# Patient Record
Sex: FEMALE | Race: WHITE
[De-identification: names, ages, dates, MRNs, and addresses within clinical notes are randomized per-mention and may not be internally consistent; named-entity substitution may affect disease eponyms.]

---

## 2017-08-12 ENCOUNTER — HOSPITAL ENCOUNTER (EMERGENCY)
Dept: HOSPITAL 45 - C.EDB | Age: 82
Discharge: HOME | End: 2017-08-12
Payer: COMMERCIAL

## 2017-08-12 VITALS
WEIGHT: 186.51 LBS | HEIGHT: 59.02 IN | WEIGHT: 186.51 LBS | BODY MASS INDEX: 37.6 KG/M2 | BODY MASS INDEX: 37.6 KG/M2 | HEIGHT: 59.02 IN

## 2017-08-12 VITALS — SYSTOLIC BLOOD PRESSURE: 158 MMHG | OXYGEN SATURATION: 95 % | HEART RATE: 67 BPM | DIASTOLIC BLOOD PRESSURE: 74 MMHG

## 2017-08-12 VITALS — TEMPERATURE: 98.24 F

## 2017-08-12 DIAGNOSIS — I48.0: ICD-10-CM

## 2017-08-12 DIAGNOSIS — E78.00: ICD-10-CM

## 2017-08-12 DIAGNOSIS — M25.062: ICD-10-CM
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
## 2017-08-12 DIAGNOSIS — S82.832A: Primary | ICD-10-CM

## 2017-08-12 DIAGNOSIS — Z79.01: ICD-10-CM

## 2017-08-12 DIAGNOSIS — Z98.890: ICD-10-CM

## 2017-08-12 DIAGNOSIS — Z98.49: ICD-10-CM

## 2017-08-12 DIAGNOSIS — K21.9: ICD-10-CM

## 2017-08-12 DIAGNOSIS — Z79.899: ICD-10-CM

## 2017-08-12 DIAGNOSIS — Z82.49: ICD-10-CM

## 2017-08-12 DIAGNOSIS — Z90.710: ICD-10-CM

## 2017-08-12 DIAGNOSIS — G47.33: ICD-10-CM

## 2017-08-12 DIAGNOSIS — I11.0: ICD-10-CM

## 2017-08-12 DIAGNOSIS — J45.909: ICD-10-CM

## 2017-08-12 DIAGNOSIS — I50.9: ICD-10-CM

## 2017-08-12 NOTE — DIAGNOSTIC IMAGING REPORT
LEFT KNEE 2 VIEWS



HISTORY:      left knee pain s/p fall.  on Coumadin



COMPARISON: Left knee 6/21/2016.



FINDINGS: No dislocation. The bones are osteopenic. Anterior soft tissue

swelling. No knee effusion. Severe osteoarthritis at the medial and

patellofemoral compartments. This remains unchanged. Abnormal lucency at the

fibular neck. This is consistent with an age-indeterminate fracture. No

radiopaque foreign bodies.



IMPRESSION:  



1. Age-indeterminate fibular neck fracture. Recommend correlation for pain to

assess for an acute fracture.

2. Anterior soft tissue swelling.

3. Severe osteoarthritis.







Electronically signed by:  Cruz Clemens M.D.

8/12/2017 11:02 AM



Dictated Date/Time:  8/12/2017 11:00 AM

## 2017-08-12 NOTE — EMERGENCY ROOM VISIT NOTE
History


Report prepared by Satinder:  Li Ralph


Under the Supervision of:  Dr. Wyatt Rae M.D.


First contact with patient:  10:37


Chief Complaint:  KNEEPAIN


Stated Complaint:  SORE LEFT LEG





History of Present Illness


The patient is a 82 year old female who presents to the Emergency Room with 

complaints of constant left knee pain, which she rates at an 8/10. The patient 

reports that she fell going up stairs one week ago. She has been taking Tylenol 

for her pain. The patient also reports having arthritis and that she gets 

injections in her knees. She denies having a history of blood clots. She 

reports that she is on Coumadin for atrial fibrillation.





   Source of History:  patient


   Onset:  1 week ago


   Position:  knee (left)


   Symptom Intensity:  rated at an 8/10


   Timing:  constant





Review of Systems


See HPI for pertinent positives & negatives. A total of 6 systems reviewed and 

were otherwise negative.





Past Medical & Surgical


Medical Problems:


(1) Asthma


(2) Benign hypertension


(3) Bronchitis


(4) CHF (congestive heart failure)


(5) Edema


(6) GERD (gastroesophageal reflux disease)


(7) Hiatal hernia


(8) High cholesterol


(9) CINDY on CPAP


(10) Paroxysmal atrial fibrillation


(11) PNA (pneumonia)


(12) s/p elective cardioversion


(13) Seasonal allergies


Surgical Problems:


(1) H/O: hysterectomy


(2) S/P cataract surgery








Family History





Cancer


FH: hypertension


Heart disease





Social History


Smoking Status:  Never Smoker


Alcohol Use:  none


Drug Use:  none


Marital Status:  


Housing Status:  lives with significant other


Occupation Status:  employed





Current/Historical Medications


Scheduled


Acetaminophen (Tylenol), 325 MG PO UD


Albuterol Hfa (Ventolin Hfa), 2 PUFFS INH Q4H


Amiodarone HCl (Amiodarone HCl), 200 MG PO DAILY


Fluticasone Prop/Salmeterol (Advair Diskus 100/50 60 Dose), 1 PUFF INH BID


Furosemide (Lasix), 80 MG PO BID


Magnesium Chloride (Slow-Mag Tab), 64 MG PO BID


Metoprolol Succinate (Metoprolol Succinate ER), 12.5 MG PO DAILY


Omeprazole (Prilosec), 20 MG PO DAILY


Potassium Chloride Microencaps (Potassium Chloride Er), 10 MEQ PO BID


Ranitidine (Zantac), 300 MG PO HS


Simvastatin (Simvastatin), 40 MG PO HS


Warfarin Sodium (Warfarin Sodium), 5 MG PO 6XWK


Warfarin Sodium (Warfarin Sodium), 7.5 MG PO WK





Allergies


Coded Allergies:  


     Alcohol (Verified  Allergy, Mild, RASH, 8/12/17)


 WIPES OR INGESTED


     Cephalosporins (Verified  Allergy, Mild, RASH, 8/12/17)


     Codeine (Verified  Allergy, Mild, RASH, 8/12/17)


     Penicillins (Verified  Allergy, Mild, RASH, 8/12/17)


     Aspirin (Unverified  Allergy, Unknown, RASH, 8/12/17)


     Levofloxacin (Verified  Allergy, Unknown, "BONE PAIN", 8/12/17)


     Sulfa Antibiotics (Verified  Allergy, Unknown, UNKNOWN, 8/12/17)





Physical Exam


Vital Signs











  Date Time  Temp Pulse Resp B/P (MAP) Pulse Ox O2 Delivery O2 Flow Rate FiO2


 


8/12/17 11:35  67 17 158/74 95   


 


8/12/17 10:22 36.8 63 16 165/78 95 Room Air  











Physical Exam


GENERAL: Patient is well appearing and in minimal distress.


HEENT: No acute trauma, normocephalic atraumatic, mucous membranes moist, no 

nasal congestion, no scleral icterus.


NECK: No stridor, no adenopathy, no meningismus, trachea is midline.


LUNGS: No dyspnea. Clear to auscultation and equal bilaterally. No wheeze, no 

rhonchi.


HEART: Regular rate and rhythm.  No murmurs, rubs, gallops appreciated.


BACK: No midline tenderness, no CVA tenderness


EXTREMITIES: Mild effusion of left knee compared to right. Tenderness to 

palpation to lateral aspect of left knee. Left knee is stable and she has full 

range of motion with minimal pain. 


NEUROLOGIC: Alert and oriented, no acute motor or sensory deficits, no focal 

weakness, cranial nerves grossly intact.


SKIN: No rash, no jaundice, no diaphoresis.





Medical Decision & Procedures


ER Provider


Diagnostic Interpretation:


X ray results are stated below per my interpretation and the radiologist's 

interpretation. 





LEFT KNEE 2 VIEWS





HISTORY:      left knee pain s/p fall.  on Coumadin





COMPARISON: Left knee 6/21/2016.





FINDINGS: No dislocation. The bones are osteopenic. Anterior soft tissue


swelling. No knee effusion. Severe osteoarthritis at the medial and


patellofemoral compartments. This remains unchanged. Abnormal lucency at the


fibular neck. This is consistent with an age-indeterminate fracture. No


radiopaque foreign bodies.





IMPRESSION:  





1. Age-indeterminate fibular neck fracture. Recommend correlation for pain to


assess for an acute fracture.


2. Anterior soft tissue swelling.


3. Severe osteoarthritis.











Electronically signed by:  Cruz Clemens M.D.


8/12/2017 11:02 AM





Dictated Date/Time:  8/12/2017 11:00 AM





ED Course


1038: The patient was evaluated in room C3. A complete history and physical 

exam was performed.





1118: I went over treatment plans with the patient and she says that she does 

not want casting or splinting and that she will follow up with orthopedics. She 

will use her walker at home. I went over and she understands symptoms that 

require return. 





1120: Reevaluated the patient. Discussed results and discharge instructions: 

She verbalized understanding and agreement.   The patient is ready for 

discharge.





Medical Decision


Differential: Fracture, Dislocation, Cellulitis, Septic Joint, Ligamentous 

Injury, Effusion, DVT, amongst other pathologies entertained.





Pleasant 82 yr old female with recent left knee trauma.  Small effusion on exam 

likely hemarthrosis given Coumadin use.  This knee is not infected by exam 

currently.  Imaging with small fibular neck fracture consistent with recent 

injury.  Discussed immobilization but we both agree this would be dangerous 

given her already poor mobility along with fact she is on Coumadin.  I do not 

feel this is dvt, especially given therapeutic INR.  She has ortho that she 

will follow up with.  Stable and comfortable.





Medication Reconcilliation


Current Medication List:  was personally reviewed by me





Blood Pressure Screening


Patient's blood pressure:  Elevated blood pressure


Blood pressure disposition:  Elevated BP felt to be situational





Impression





 Primary Impression:  


 Left fibular fracture


 Additional Impressions:  


 Closed fracture of neck of fibula


 Knee hemarthrosis, left





Scribe Attestation


The scribe's documentation has been prepared under my direction and personally 

reviewed by me in its entirety. I confirm that the note above accurately 

reflects all work, treatment, procedures, and medical decision making performed 

by me.





Departure Information


Dispostion


Home / Self-Care





Referrals


Soham Roche MD (PCP)








Aaron Ervin M.D.





Patient Instructions


My [facility]





Additional Instructions





There is a fracture of the top of your Fibula.  Rest, elevation, Ice are 

important over the next few days.  Return if worsening pain, difficulty 

breathing or other concerns.


Use Tylenol as needed for pain.


Use your walker.


Follow up with Orthopedics in the next 1 to 2 weeks.





Problem Qualifiers

## 2017-08-13 ENCOUNTER — HOSPITAL ENCOUNTER (INPATIENT)
Dept: HOSPITAL 45 - C.EDB | Age: 82
LOS: 2 days | Discharge: HOME | DRG: 563 | End: 2017-08-15
Attending: FAMILY MEDICINE | Admitting: HOSPITALIST
Payer: COMMERCIAL

## 2017-08-13 VITALS
HEIGHT: 59 IN | HEIGHT: 59 IN | BODY MASS INDEX: 16.76 KG/M2 | BODY MASS INDEX: 16.76 KG/M2 | WEIGHT: 83.11 LBS | WEIGHT: 83.11 LBS

## 2017-08-13 DIAGNOSIS — Z79.01: ICD-10-CM

## 2017-08-13 DIAGNOSIS — E87.6: ICD-10-CM

## 2017-08-13 DIAGNOSIS — I48.0: ICD-10-CM

## 2017-08-13 DIAGNOSIS — Z51.81: ICD-10-CM

## 2017-08-13 DIAGNOSIS — Z98.49: ICD-10-CM

## 2017-08-13 DIAGNOSIS — I50.32: ICD-10-CM

## 2017-08-13 DIAGNOSIS — Z87.01: ICD-10-CM

## 2017-08-13 DIAGNOSIS — E78.00: ICD-10-CM

## 2017-08-13 DIAGNOSIS — G47.33: ICD-10-CM

## 2017-08-13 DIAGNOSIS — E78.5: ICD-10-CM
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
## 2017-08-13 DIAGNOSIS — I13.0: ICD-10-CM

## 2017-08-13 DIAGNOSIS — N18.3: ICD-10-CM

## 2017-08-13 DIAGNOSIS — Z87.442: ICD-10-CM

## 2017-08-13 DIAGNOSIS — K21.9: ICD-10-CM

## 2017-08-13 DIAGNOSIS — Z98.890: ICD-10-CM

## 2017-08-13 DIAGNOSIS — N39.0: ICD-10-CM
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
## 2017-08-13 DIAGNOSIS — Z82.49: ICD-10-CM
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
## 2017-08-13 DIAGNOSIS — M25.062: ICD-10-CM

## 2017-08-13 DIAGNOSIS — S82.832A: Primary | ICD-10-CM

## 2017-08-13 DIAGNOSIS — J45.909: ICD-10-CM

## 2017-08-13 DIAGNOSIS — Z90.710: ICD-10-CM

## 2017-08-13 DIAGNOSIS — Z79.899: ICD-10-CM

## 2017-08-13 LAB
ALBUMIN/GLOB SERPL: 0.6 {RATIO} (ref 0.9–2)
ALP SERPL-CCNC: 298 U/L (ref 45–117)
ALT SERPL-CCNC: 34 U/L (ref 12–78)
ANION GAP SERPL CALC-SCNC: 9 MMOL/L (ref 3–11)
APPEARANCE UR: (no result)
AST SERPL-CCNC: 71 U/L (ref 15–37)
BASOPHILS # BLD: 0.02 K/UL (ref 0–0.2)
BASOPHILS NFR BLD: 0.1 %
BILIRUB UR-MCNC: (no result) MG/DL
BUN SERPL-MCNC: 20 MG/DL (ref 7–18)
BUN/CREAT SERPL: 15.4 (ref 10–20)
CALCIUM SERPL-MCNC: 8.5 MG/DL (ref 8.5–10.1)
CHLORIDE SERPL-SCNC: 100 MMOL/L (ref 98–107)
CKMB/CK RATIO: (no result) (ref 0–3)
CO2 SERPL-SCNC: 27 MMOL/L (ref 21–32)
COLOR UR: (no result)
COMPLETE: YES
CREAT SERPL-MCNC: 1.3 MG/DL (ref 0.6–1.2)
CRP SERPL-MCNC: 2.05 MG/DL (ref 0–0.29)
EOSINOPHIL NFR BLD AUTO: 265 K/UL (ref 130–400)
GLOBULIN SER-MCNC: 4.4 GM/DL (ref 2.5–4)
GLUCOSE SERPL-MCNC: 110 MG/DL (ref 70–99)
HCT VFR BLD CALC: 37.6 % (ref 37–47)
IG%: 0.3 %
IMM GRANULOCYTES NFR BLD AUTO: 6.7 %
INR PPP: 2.3 (ref 0.9–1.1)
LYME DISEASE AB IGG: (no result)
LYME DISEASE AB IGM: (no result)
LYMPHOCYTES # BLD: 1.23 K/UL (ref 1.2–3.4)
MAGNESIUM SERPL-MCNC: 2 MG/DL (ref 1.8–2.4)
MANUAL MICROSCOPIC REQUIRED?: NO
MCH RBC QN AUTO: 26.2 PG (ref 25–34)
MCHC RBC AUTO-ENTMCNC: 31.6 G/DL (ref 32–36)
MCV RBC AUTO: 82.8 FL (ref 80–100)
MONOCYTES NFR BLD: 5.8 %
NEUTROPHILS # BLD AUTO: 0 %
NEUTROPHILS NFR BLD AUTO: 87.1 %
NITRITE UR QL STRIP: (no result)
PARTIAL THROMBOPLASTIN RATIO: 1.4
PH UR STRIP: 5 [PH] (ref 4.5–7.5)
PMV BLD AUTO: 10.6 FL (ref 7.4–10.4)
POTASSIUM SERPL-SCNC: 3.7 MMOL/L (ref 3.5–5.1)
PROTHROMBIN TIME: 25.2 SECONDS (ref 9–12)
RBC # BLD AUTO: 4.54 M/UL (ref 4.2–5.4)
RBC # CSF: 0.88 NG/ML (ref 0–0.5)
REVIEW REQ?: YES
SODIUM SERPL-SCNC: 136 MMOL/L (ref 136–145)
SP GR UR STRIP: 1.02 (ref 1–1.03)
URINE EPITHELIAL CELL AUTO: >30 /LPF (ref 0–5)
UROBILINOGEN UR-MCNC: (no result) MG/DL
WBC # BLD AUTO: 18.46 K/UL (ref 4.8–10.8)
ZZUR CULT IF INDIC CLEAN CATCH: YES

## 2017-08-14 VITALS
SYSTOLIC BLOOD PRESSURE: 149 MMHG | DIASTOLIC BLOOD PRESSURE: 70 MMHG | TEMPERATURE: 97.7 F | HEART RATE: 66 BPM | OXYGEN SATURATION: 96 %

## 2017-08-14 VITALS
OXYGEN SATURATION: 95 % | TEMPERATURE: 97.88 F | SYSTOLIC BLOOD PRESSURE: 124 MMHG | HEART RATE: 55 BPM | DIASTOLIC BLOOD PRESSURE: 63 MMHG

## 2017-08-14 VITALS
TEMPERATURE: 97.34 F | OXYGEN SATURATION: 93 % | DIASTOLIC BLOOD PRESSURE: 60 MMHG | HEART RATE: 64 BPM | SYSTOLIC BLOOD PRESSURE: 95 MMHG

## 2017-08-14 VITALS
OXYGEN SATURATION: 95 % | DIASTOLIC BLOOD PRESSURE: 82 MMHG | HEART RATE: 67 BPM | SYSTOLIC BLOOD PRESSURE: 155 MMHG | TEMPERATURE: 97.7 F

## 2017-08-14 VITALS — SYSTOLIC BLOOD PRESSURE: 106 MMHG | HEART RATE: 71 BPM | DIASTOLIC BLOOD PRESSURE: 63 MMHG

## 2017-08-14 LAB
ANION GAP SERPL CALC-SCNC: 6 MMOL/L (ref 3–11)
BUN SERPL-MCNC: 19 MG/DL (ref 7–18)
BUN/CREAT SERPL: 15.6 (ref 10–20)
CALCIUM SERPL-MCNC: 8.4 MG/DL (ref 8.5–10.1)
CHLORIDE SERPL-SCNC: 102 MMOL/L (ref 98–107)
CO2 SERPL-SCNC: 30 MMOL/L (ref 21–32)
CREAT CL PREDICTED SERPL C-G-VRATE: 21.9 ML/MIN
CREAT SERPL-MCNC: 1.2 MG/DL (ref 0.6–1.2)
EOSINOPHIL NFR BLD AUTO: 252 K/UL (ref 130–400)
GLUCOSE SERPL-MCNC: 78 MG/DL (ref 70–99)
HCT VFR BLD CALC: 36.2 % (ref 37–47)
INR PPP: 2.2 (ref 0.9–1.1)
MAGNESIUM SERPL-MCNC: 2.2 MG/DL (ref 1.8–2.4)
MCH RBC QN AUTO: 25.7 PG (ref 25–34)
MCHC RBC AUTO-ENTMCNC: 30.9 G/DL (ref 32–36)
MCV RBC AUTO: 83.2 FL (ref 80–100)
PMV BLD AUTO: 11.4 FL (ref 7.4–10.4)
POTASSIUM SERPL-SCNC: 3.2 MMOL/L (ref 3.5–5.1)
PROTHROMBIN TIME: 23.8 SECONDS (ref 9–12)
RBC # BLD AUTO: 4.35 M/UL (ref 4.2–5.4)
SODIUM SERPL-SCNC: 138 MMOL/L (ref 136–145)
WBC # BLD AUTO: 14.34 K/UL (ref 4.8–10.8)

## 2017-08-14 RX ADMIN — FLUTICASONE PROPIONATE AND SALMETEROL SCH PUFF: 50; 100 POWDER RESPIRATORY (INHALATION) at 21:07

## 2017-08-14 RX ADMIN — METOPROLOL SUCCINATE SCH MG: 25 TABLET, EXTENDED RELEASE ORAL at 08:14

## 2017-08-14 RX ADMIN — IMIPENEM AND CILASTATIN SODIUM SCH MLS/HR: 500; 500 INJECTION, POWDER, FOR SOLUTION INTRAVENOUS at 05:28

## 2017-08-14 RX ADMIN — ACETAMINOPHEN PRN MG: 325 TABLET ORAL at 22:21

## 2017-08-14 RX ADMIN — ALBUTEROL SULFATE SCH PUFFS: 90 AEROSOL, METERED RESPIRATORY (INHALATION) at 16:01

## 2017-08-14 RX ADMIN — ACETAMINOPHEN PRN MG: 325 TABLET ORAL at 13:50

## 2017-08-14 RX ADMIN — ALBUTEROL SULFATE SCH PUFFS: 90 AEROSOL, METERED RESPIRATORY (INHALATION) at 08:08

## 2017-08-14 RX ADMIN — FUROSEMIDE SCH MG: 80 TABLET ORAL at 17:03

## 2017-08-14 RX ADMIN — FLUTICASONE PROPIONATE AND SALMETEROL SCH PUFF: 50; 100 POWDER RESPIRATORY (INHALATION) at 08:12

## 2017-08-14 RX ADMIN — IMIPENEM AND CILASTATIN SODIUM SCH MLS/HR: 250; 250 INJECTION, POWDER, FOR SOLUTION INTRAVENOUS at 18:12

## 2017-08-14 RX ADMIN — ALBUTEROL SULFATE SCH PUFFS: 90 AEROSOL, METERED RESPIRATORY (INHALATION) at 04:00

## 2017-08-14 RX ADMIN — POTASSIUM CHLORIDE SCH MEQ: 750 TABLET, EXTENDED RELEASE ORAL at 08:16

## 2017-08-14 RX ADMIN — AMIODARONE HYDROCHLORIDE SCH MG: 200 TABLET ORAL at 08:13

## 2017-08-14 RX ADMIN — IMIPENEM AND CILASTATIN SODIUM SCH MLS/HR: 500; 500 INJECTION, POWDER, FOR SOLUTION INTRAVENOUS at 11:28

## 2017-08-14 RX ADMIN — Medication SCH MG: at 08:15

## 2017-08-14 RX ADMIN — POTASSIUM CHLORIDE SCH MEQ: 750 TABLET, EXTENDED RELEASE ORAL at 21:07

## 2017-08-14 RX ADMIN — ATORVASTATIN CALCIUM SCH MG: 10 TABLET, FILM COATED ORAL at 08:15

## 2017-08-14 RX ADMIN — PANTOPRAZOLE SCH MG: 40 TABLET, DELAYED RELEASE ORAL at 08:13

## 2017-08-14 RX ADMIN — ALBUTEROL SULFATE SCH PUFFS: 90 AEROSOL, METERED RESPIRATORY (INHALATION) at 19:45

## 2017-08-14 RX ADMIN — Medication SCH MG: at 21:08

## 2017-08-14 RX ADMIN — ALBUTEROL SULFATE SCH PUFFS: 90 AEROSOL, METERED RESPIRATORY (INHALATION) at 11:28

## 2017-08-14 RX ADMIN — POTASSIUM CHLORIDE SCH MEQ: 750 TABLET, EXTENDED RELEASE ORAL at 21:17

## 2017-08-14 RX ADMIN — FUROSEMIDE SCH MG: 80 TABLET ORAL at 08:24

## 2017-08-14 NOTE — DIAGNOSTIC IMAGING REPORT
CHEST ONE VIEW PORTABLE



CLINICAL HISTORY: Sepsis dyspnea



COMPARISON STUDY:  11/9/2015



FINDINGS: Moderate stable cardiomegaly. Stable mediastinal fullness unchanged

from the prior exam. Components of mild congestive failure present. Increased

prominence of pulmonary vasculature compared to the prior study. Fixed lateral

hernia. 



IMPRESSION:  Mild congestive heart failure superimposed upon chronic change. 











The above report was generated using voice recognition software.  It may contain

grammatical, syntax or spelling errors.









Electronically signed by:  Enresto Gusman M.D.

8/14/2017 6:54 AM



Dictated Date/Time:  8/14/2017 6:53 AM

## 2017-08-14 NOTE — DIAGNOSTIC IMAGING REPORT
LEFT VENOUS DOPP LOWER EXT UNILAT



CLINICAL HISTORY: left leg swelling pain. Edema.



TECHNIQUE: Venous Dopp



COMPARISON STUDY:  None



FINDINGS: Normal study



IMPRESSION:  Normal study 









The above report was generated using voice recognition software.  It may contain

grammatical, syntax or spelling errors.







Electronically signed by:  Ernesto Gusman M.D.

8/14/2017 6:39 AM



Dictated Date/Time:  8/14/2017 6:39 AM

## 2017-08-14 NOTE — ORTHOPEDIC CONSULTATION REPORT
DATE OF CONSULTATION:  08/14/2017

 

REASON FOR CONSULT:  Left knee pain.

 

HISTORY OF PRESENT ILLNESS:  The patient is an 82-year-old white female who

was admitted for UTI and fevers during this stay.  She was complaining of

some left lateral knee pain and we were consulted to see her.  She states

that last week, she was seen in the Emergency Room after a mechanical fall. 

She states that she was walking in another person's home and went to go up

one step.  She states that the step was higher than normal and she caught her

toe on the step and ended up falling directly in both knees.  At that time,

she had moderate pain in the left knee and that was on the lateral aspect. 

She was brought to the Emergency Room and was found that she had a proximal

fibular neck fracture that was nondisplaced.  She was sent home with plans

for use of a walker and weightbearing as tolerated and to follow up with

orthopedics.  She states that over the last week, she was having less pain

actually in the area, but continued to bother her somewhat.  She states that

she is on Coumadin and had some increased swelling in the area as well. 

Currently, she is sitting in a chair and is in no acute distress and

pleasant.

 

PAST MEDICAL HISTORY:  Chronic atrial fibrillation on chronic Coumadin

therapy, diastolic heart failure, hypertension, hyperlipidemia, obstructive

sleep apnea on CPAP, asthma, and chronic kidney disease.

 

PAST SURGICAL HISTORY:  Cataracts removal in the past and TAHBSO.

 

FAMILY AND SOCIAL HISTORY:  As per admitting history and physical.

 

ALLERGIES:  ALCOHOL, ASPIRIN, CEPHALOSPORINS, CODEINE, LEVOFLOXACIN,

PENICILLINS, AND SULFA ANTIBIOTICS.

 

MEDICATIONS:  Tylenol 325 mg p.o. p.r.n. as directed, albuterol 2 puffs

inhaled q. 4 hours, amiodarone 200 mg p.o. daily, atorvastatin 10 mg p.o.

daily, Advair Diskus 1 puff inhaled b.i.d., furosemide 80 mg p.o. b.i.d.,

Slow-Mag 64 mg p.o. b.i.d., metoprolol 12.5 mg p.o. daily, Prilosec 20 mg

p.o. daily, potassium chloride extended release 10 mEq p.o. b.i.d., Zantac

300 mg at bedtime and warfarin 5 mg p.o. 6 times a week at bedtime and 7.5 mg

p.o. once weekly at bedtime.

 

REVIEW OF SYSTEMS:  As per admitting history and physical.

 

PHYSICAL EXAMINATION:

VITAL SIGNS:  Latest vital signs are temperature 36.5, pulse 66, respirations

18, /70, and pulse ox 96 on room air.

GENERAL:  The patient is an obese white female, who is alert and oriented x3.

 She is in no acute distress, pleasant and cooperative.

EXTREMITIES:  On examination, which focuses on her left knee, she currently

is able to actively flex and extend the knee without discomfort.  There is no

erythema and no heat to the left knee compared to the right.  She does have a

little bit more swelling and an effusion of the left knee compared to the

right.  The swelling does go down the lower extremity and she does have what

appears to be likely hematoma down the posterolateral aspect of her calf,

which is mildly tender on palpation.  Negative Homans' exam at this time.  I

can take her through range of motion passively from 90 degrees to extension

with only minimal discomfort in the left lateral portion of the knee.  On

palpation over the fibular head, she has moderate pain during this time.  I

can appreciate no crepitus during palpation.  Internal and external rotation

do provide a little bit of discomfort on the lateral aspect of the left knee.

 She is neurovascularly intact and has good sensation.

 

ASSESSMENT:  Likely proximal fibular neck fracture from previous fall 1 week

ago that is progressively getting better.

 

PLAN:  Dr. Ervin and I have reviewed her films and he has seen the patient

as well.  We will start her on physical therapy protocol, weightbearing as

tolerated with assistive device as needed.  She has seen Dr. Ervin in the

past for injections and if over the time, she feels that she is having

increasing knee pain that she has had in the past that she can see him in the

office.  She can also follow up with him in the office if she continues to

have her left lateral knee pain.  The patient is asking for pain medication,

which she states that Tylenol is not working for her.  Advised her to take 2

of the 325-mg tablets of Tylenol every 8 hours and I will go ahead and add

tramadol to her pain regimen here at the hospital.

## 2017-08-14 NOTE — HISTORY & PHYSICAL EXAMINATION
DATE OF ADMISSION:  08/13/2017

 

PRIMARY CARE PROVIDER:  Dr. Roche.

 

CHIEF COMPLAINT:  Increasing pain in the left knee joint and also complains

of fever without any dysuria.

 

HISTORY OF PRESENT COMPLAINT:  She is an 82-year-old female with significant

past medical history of chronic atrial fibrillation, on anticoagulation,

diastolic heart failure, hypertension, obstructive sleep apnea, on CPAP,

history of asthma, chronic kidney disease and hyperlipidemia.  Apparently had

a fall yesterday and injured her left knee, that was a mechanical fall.  She

came to the ER and she had an x-ray of the left knee that did show

age-indeterminate fibular neck fracture.  She was sent home with pain

medications and Tylenol, and she was advised to make an appointment with

orthopedic doctor.  Dr. Ervin had done injection of her knee joint in the

past.  She was complaining of more pain as of today and she is back but the

swelling is decreased.  She also has some feverish feeling but did not have

any other symptoms suggestive of an infection.

 

In the ER, she was noted to be febrile at 38.2 and her white count was 18.46.

 Her UA is suggestive of infection.  Her knee swelling decreased, but with

fever and UTI and ongoing knee pain, she was admitted to medical floor for

continuation of care.

 

PAST MEDICAL HISTORY:  Chronic atrial fibrillation, on anticoagulation,

diastolic heart failure, hypertension, hyperlipidemia, obstructive sleep

apnea, on CPAP, mild asthma and chronic kidney disease.

 

PAST SURGICAL HISTORY:  Cataract surgery, total abdominal hysterectomy and

tubal removal.

 

FAMILY HISTORY:  Nothing significant to note.

 

SOCIAL HISTORY:  She is .  She lives with her .  She has 2

children.  She does not smoke, does not drink.

 

ALLERGIES:  SHE IS ALLERGIC TO ALCOHOL, CEPHALOSPORIN, CODEINE, PENICILLINS,

ASPIRIN, LEVOFLOXACIN AND SULFA.

 

MEDICATIONS:  As an outpatient, she has been on Tylenol 325 mg as directed,

albuterol sulfate 2 puffs every 4 hourly as needed, amiodarone 200 mg daily,

atorvastatin 10 mg daily, Advair Diskus 100/50 one puff twice daily,

furosemide 80 mg twice daily, magnesium chloride 64 mg b.i.d., Toprol-XL 25

mg tablet 12.5 daily, omeprazole 20 mg daily, potassium chloride 10 mEq

daily, ranitidine 300 mg at night and warfarin sodium 5 mg as directed.

 

REVIEW OF SYSTEMS:  Other systemic review unremarkable except those mentioned

in history of present complaint.

 

PHYSICAL EXAMINATION:

GENERAL:  On examination in the Emergency Room, she was not in any acute

distress.

VITAL SIGNS:  Temperature 38.2 initially, that came down to 36.8, pulse of

67, blood pressure 126/66, saturation 92% on room air.

HEENT:  Unremarkable.

NECK:  Supple.  No JVD, no bruit.

CHEST:  Clear to auscultation bilaterally.

HEART:  S1, S2 regular.

ABDOMEN:  Soft, benign, mildly tender in the hypogastrium.  No organomegaly. 

Bowel sounds present.

EXTREMITIES:  Trace edema bilaterally.  Examination of the left knee joint

did show minimal swelling of the knee joint anteromedially with some

tenderness locally.  Knee movement is moderately painful.  No obvious joint

fluid noted.

CENTRAL NERVOUS SYSTEM:  Alert, awake, oriented x3.  No focal sensory and/or

motor deficit appreciated.

 

LABORATORY DATA:  Noted today, white count was 18.46, H&H 11.9/37.6, platelet

was 265.  Sodium 136, potassium 3.7, chloride 100, carbon dioxide 27, BUN 20,

creatinine 1.30.  Random glucose 100.  Alkaline phosphatase 298.  AST

slightly elevated at 71.  Troponin less than 0.015.  C-reactive protein 2.05.

 ESR was 30.  Prolactin was 0.88.  INR was 2.3.  PT ratio 1.4.  UA

examination suggestive of infection.  Lyme disease negative.

 

Chest x-ray unremarkable.  X-ray of the knee did show indeterminate fracture

of the fibular neck.  Ultrasound negative for any DVT.

 

IMPRESSION AND PLAN:

1.  Urinary tract infection.  The patient will be admitted to medical floor. 

She was started on intravenous daptomycin and imipenem with possibility of

sepsis.Doubt any septic arthritis.  We will continue with imipenem now.  

Blood culture and urine culture have been sent.

2.  Fibular neck fracture on the left side status post fall.  There is some

swelling.  The patient is on Coumadin.  We will ask for orthopedic evaluation

and PT and OT from there.  Doubt any obvious hematoma at this time or any

infection in the joint.

3.  Atrial fibrillation, on anticoagulation.  No acute findings at this time.

 Her EKG is in sinus rhythm with minimal nonspecific ST-T wave changes.

4.  Chronic diastolic heart failure.  Continue with current dose of Lasix.

5.  Hypertension.  Continue with current medications for blood pressure.

6.  Sleep apnea, on CPAP.  The patient can use her own CPAP while in the

hospital.

7.  Asthma, seems to be stable.  Continue with her inhalers.

8.  Gastrointestinal prophylaxis with Protonix and ranitidine.

9.  Deep venous thrombosis prophylaxis, on Coumadin.

10.  Code status.  Full code.

 

In my clinical judgment, the beneficiary meets criteria as per CMS for

2-midnight stay in the hospital.

 

 

 

MTDD

## 2017-08-14 NOTE — DIAGNOSTIC IMAGING REPORT
LEFT KNEE 3 VIEWS



CLINICAL HISTORY: pain, swelling, fever pain. Edema.



COMPARISON: None.



DISCUSSION: Considerable degenerative change all major joint compartments.

Moderate reactive osteophytic change throughout. Significant degenerative change

patellofemoral joint compartment. There is no evidence for soft tissue swelling.



IMPRESSION: Considerable degenerative change. No acute bony abnormality.











The above report was generated using voice recognition software.  It may contain

grammatical, syntax or spelling errors.







Electronically signed by:  Ernesto Gusman M.D.

8/14/2017 6:48 AM



Dictated Date/Time:  8/14/2017 6:48 AM

## 2017-08-14 NOTE — EMERGENCY ROOM VISIT NOTE
History


First contact with patient:  21:58


Chief Complaint:  KNEEPAIN


Stated Complaint:  LEFT KNEE PAIN, FEVER





History of Present Illness


The patient is a 82 year old female who presents to the Emergency Room with 

complaints of fever, chills, left knee pain and swelling for the past day.  

Patient is on Coumadin for A. fib.  Dr. Ervin is her orthopedic doctor.  Dr. Alvarez is her cardiologist.  Patient seen here yesterday and diagnosed with a 

left proximal fibular fracture.  She was advised to follow-up with orthopedics.

  Patient took Tylenol at 8 PM today.  She took 1 g.  Patient was increasing 

left knee pain and swelling.  She's had injections in this knee before.   

Patient also complains of left lateral chest pain after falling 1 week ago when 

she initially injured her knee.  Patient thinks it's a muscular pain.  Patient 

denies dyspnea, productive cough, abdominal pain, vomiting, diarrhea, back pain

, urinary symptoms, cold symptoms.  No recent travel.  No sick contacts.





Review of Systems


See HPI for pertinent positives & negatives. A total of 10 systems reviewed and 

were otherwise negative.





Past Medical/Surgical History


Medical Problems:


(1) Asthma


(2) Benign hypertension


(3) Bronchitis


(4) CHF (congestive heart failure)


(5) Edema


(6) Fall


(7) GERD (gastroesophageal reflux disease)


(8) Hiatal hernia


(9) High cholesterol


(10) Left knee pain


(11) CINDY on CPAP


(12) Paroxysmal atrial fibrillation


(13) PNA (pneumonia)


(14) s/p elective cardioversion


(15) Seasonal allergies


(16) UTI (urinary tract infection)


Surgical Problems:


(1) H/O: hysterectomy


(2) S/P cataract surgery








Family History





Cancer


FH: hypertension


Heart disease





Social History


Smoking Status:  Never Smoker


Alcohol Use:  none


Drug Use:  none


Marital Status:  


Housing Status:  lives with significant other


Occupation Status:  employed





Current/Historical Medications


Scheduled


Albuterol Sulfate (Proair Respiclick), 2 PUFFS INH Q4H


Amiodarone HCl (Amiodarone HCl), 200 MG PO DAILY


Atorvastatin (Atorvastatin Calcium), 10 MG PO DAILY


Fluticasone Prop/Salmeterol (Advair Diskus 100/50 60 Dose), 1 PUFF INH BID


Furosemide (Furosemide), 80 MG PO BID


Magnesium Chloride (Slow-Mag Tab), 64 MG PO BID


Metoprolol Succinate (Metoprolol Succinate ER), 12.5 MG PO DAILY


Omeprazole (Prilosec), 20 MG PO DAILY


Potassium Chloride Microencaps (Potassium Chloride Er), 10 MEQ PO BID


Ranitidine (Zantac), 300 MG PO HS


Warfarin Sodium (Warfarin Sodium), 5 MG PO 6XWK


Warfarin Sodium (Warfarin Sodium), 7.5 MG PO WK





Scheduled PRN


Acetaminophen (Tylenol), 325 MG PO UD PRN for Pain





Physical Exam


Vital Signs











  Date Time  Temp Pulse Resp B/P (MAP) Pulse Ox O2 Delivery O2 Flow Rate FiO2


 


8/14/17 01:05 36.8       


 


8/14/17 00:30  67 18 126/66 92 Room Air  


 


8/13/17 22:40  73 18 111/62 90 Room Air  


 


8/13/17 22:40     90 Room Air  


 


8/13/17 21:10 38.2 81 19 123/64 91 Room Air  











Physical Exam


VITALS: Vitals are noted on the nurse's note and reviewed by myself.  Vital 

signs eyebrow


GENERAL: Pleasant female, in no acute distress, nondiaphoretic, well-developed 

well-nourished.


SKIN: The skin was without rashes, erythema, edema, or bruising.  There is no 

tenting of the skin.  Capillary reflex less than 2 seconds.


HEAD: Normocephalic atraumatic.  


EARS: External auditory canals clear, tympanic membranes pearly gray without 

erythema or effusion bilaterally.


EYES: Pupils equal round and reactive to light and accommodation.  Conjunctivae 

without injection, sclerae without icterus.  Extraocular movements intact.  


NOSE: Patent, turbinates without inflammation or discharge.  No sinus 

tenderness.


MOUTH: Mucous membranes moist.   Pharynx without erythema or exudate.  Uvula 

midline.  Airway patent.  Tongue does not deviate.  


NECK: Supple without nuchal rigidity.  No lymphadenopathy.  No thyromegaly.  

Cervical spine is nontender.  No JVD.


HEART: Regular rate and rhythm  


LUNGS: Clear to auscultation bilaterally without wheezes, rales or rhonchi.  No 

dullness to percussion.  No retractions or accessory muscle use.


ABDOMEN: Positive bowel sounds x 4.  Normal tympanic percussion.  Soft, 

nontender, without masses or organomegaly.  Hernandez sign negative.  No guarding 

or rebound tenderness.


MUSCULOSKELETAL: No muscle atrophy, erythema,  noted.   Left knee is slightly 

more edematous than the right knee, patient is   obese with large knees to 

begin with, no lymphangitis or erythema present.  Patient is tender to 

palpation over the proximal fibula.  No left hip, thigh, ankle or foot 

tenderness.  Pedal pulses +2 equal present bilaterally.


NEURO: Patient was alert and oriented to person place and time.  Normal 

sensation to light and sharp touch.  No focal neurological deficits.





Medical Decision & Procedures


Laboratory Results


8/13/17 22:37








Red Blood Count 4.54, Mean Corpuscular Volume 82.8, Mean Corpuscular Hemoglobin 

26.2, Mean Corpuscular Hemoglobin Concent 31.6, Mean Platelet Volume 10.6, 

Neutrophils (%) (Auto) 87.1, Lymphocytes (%) (Auto) 6.7, Monocytes (%) (Auto) 

5.8, Eosinophils (%) (Auto) 0.0, Basophils (%) (Auto) 0.1, Neutrophils # (Auto) 

16.09, Lymphocytes # (Auto) 1.23, Monocytes # (Auto) 1.07, Eosinophils # (Auto) 

0.00, Basophils # (Auto) 0.02





8/13/17 22:37

















Test


  8/13/17


22:23 8/13/17


22:37


 


Urine Color DK YELLOW  


 


Urine Appearance CLOUDY (CLEAR)  


 


Urine pH 5.0 (4.5-7.5)  


 


Urine Specific Gravity


  1.017


(1.000-1.030) 


 


 


Urine Protein NEG (NEG)  


 


Urine Glucose (UA) NEG (NEG)  


 


Urine Ketones TRACE (NEG)  


 


Urine Occult Blood NEG (NEG)  


 


Urine Nitrite NEG (NEG)  


 


Urine Bilirubin NEG (NEG)  


 


Urine Urobilinogen NEG (NEG)  


 


Urine Leukocyte Esterase MODERATE (NEG)  


 


Urine WBC (Auto) >30 /hpf (0-5)  


 


Urine RBC (Auto) 0-4 /hpf (0-4)  


 


Urine Hyaline Casts (Auto)


  5-10 /lpf


(0-5) 


 


 


Urine Epithelial Cells (Auto) >30 /lpf (0-5)  


 


Urine Bacteria (Auto) 1+ (NEG)  


 


White Blood Count


  


  18.46 K/uL


(4.8-10.8)


 


Red Blood Count


  


  4.54 M/uL


(4.2-5.4)


 


Hemoglobin


  


  11.9 g/dL


(12.0-16.0)


 


Hematocrit  37.6 % (37-47) 


 


Mean Corpuscular Volume


  


  82.8 fL


()


 


Mean Corpuscular Hemoglobin


  


  26.2 pg


(25-34)


 


Mean Corpuscular Hemoglobin


Concent 


  31.6 g/dl


(32-36)


 


Platelet Count


  


  265 K/uL


(130-400)


 


Mean Platelet Volume


  


  10.6 fL


(7.4-10.4)


 


Neutrophils (%) (Auto)  87.1 % 


 


Lymphocytes (%) (Auto)  6.7 % 


 


Monocytes (%) (Auto)  5.8 % 


 


Eosinophils (%) (Auto)  0.0 % 


 


Basophils (%) (Auto)  0.1 % 


 


Neutrophils # (Auto)


  


  16.09 K/uL


(1.4-6.5)


 


Lymphocytes # (Auto)


  


  1.23 K/uL


(1.2-3.4)


 


Monocytes # (Auto)


  


  1.07 K/uL


(0.11-0.59)


 


Eosinophils # (Auto)


  


  0.00 K/uL


(0-0.5)


 


Basophils # (Auto)


  


  0.02 K/uL


(0-0.2)


 


RDW Standard Deviation


  


  53.1 fL


(36.4-46.3)


 


RDW Coefficient of Variation


  


  17.5 %


(11.5-14.5)


 


Immature Granulocyte % (Auto)  0.3 % 


 


Immature Granulocyte # (Auto)


  


  0.05 K/uL


(0.00-0.02)


 


Erythrocyte Sedimentation Rate


  


  72 mm/hr


(0-21)


 


Prothrombin Time


  


  25.2 SECONDS


(9.0-12.0)


 


Prothromb Time International


Ratio 


  2.3 (0.9-1.1) 


 


 


Activated Partial


Thromboplast Time 


  37.5 SECONDS


(21.0-31.0)


 


Partial Thromboplastin Ratio  1.4 


 


Anion Gap


  


  9.0 mmol/L


(3-11)


 


Estimated GFR (


American) 


  44.6 


 


 


Estimated GFR (Non-


American 


  38.4 


 


 


BUN/Creatinine Ratio  15.4 (10-20) 


 


Calcium Level


  


  8.5 mg/dl


(8.5-10.1)


 


Magnesium Level


  


  2.0 mg/dl


(1.8-2.4)


 


Total Bilirubin


  


  0.9 mg/dl


(0.2-1)


 


Aspartate Amino Transf


(AST/SGOT) 


  71 U/L (15-37) 


 


 


Alanine Aminotransferase


(ALT/SGPT) 


  34 U/L (12-78) 


 


 


Alkaline Phosphatase


  


  298 U/L


()


 


Total Creatine Kinase


  


  40 U/L


()


 


Creatine Kinase MB


  


  < 0.5 ng/ml


(0.5-3.6)


 


Creatine Kinase MB Ratio   (0-3.0) 


 


Troponin I


  


  < 0.015 ng/ml


(0-0.045)


 


C-Reactive Protein


  


  2.05 mg/dl


(0-0.29)


 


Total Protein


  


  7.2 gm/dl


(6.4-8.2)


 


Albumin


  


  2.8 gm/dl


(3.4-5.0)


 


Globulin


  


  4.4 gm/dl


(2.5-4.0)


 


Albumin/Globulin Ratio  0.6 (0.9-2) 


 


Procalcitonin


  


  0.88 ng/ml


(0-0.5)


 


Lyme Disease IgG Antibody  NEG (NEG) 


 


Lyme Disease IgM Antibody  NEG (NEG) 











Medications Administered











 Medications


  (Trade)  Dose


 Ordered  Sig/Diego


 Route  Start Time


 Stop Time Status Last Admin


Dose Admin


 


 Imipenem/


 Cilastatin Sodium


 500 mg/Dextrose  120 ml @ 


 100 mls/hr  NOW  STAT


 IV  8/13/17 23:31


 8/14/17 00:42 DC 8/13/17 23:51


100 MLS/HR


 


 Daptomycin 500 mg/


 Sodium Chloride  60 ml @ 


 120 mls/hr  NOW  STAT


 IV  8/13/17 23:56


 8/14/17 00:25 DC 8/14/17 01:04


120 MLS/HR


 


 Acetaminophen


  (Tylenol Tab)  1,000 mg  NOW  STAT


 PO  8/14/17 00:04


 8/14/17 00:05 DC 8/14/17 01:05


1,000 MG











ED Course


Prior records/ancillary studies reviewed.


Triage Nursing notes reviewed.


Additional history obtained from family.





The patient's history was concerning for fever.





Differential diagnosis:


Etiologies such as viral syndrome, septic knee, Lyme's, otitis, pharyngitis, 

pneumonia, influenza, meningitis, urinary tract infection, sepsis, bacteremia, 

as well as others were entertained.





Physical examination:


Patient is alert and well appearing





ER treatment provided:


Daptomycin, Primaxin, Tylenol


On reassessment the patient felt better.





Diagnostics interpreted by me:





ECG:  Normal sinus, normal intervals, no acute ST-T wave changes.  Impression 

normal sinus rhythm interpreted by myself





The labs revealed leukocytosis.  Elevated pro calcitonin.  Elevated 

sedimentation rate and CRP.


Urine concerning for infection and sent for culture.  Blood cultures pending.  

INR 2.3





Imaging studies:


Knee x-ray with ongoing proximal fibular fracture per my interpretation.  Chest 

x-ray with widening of the mediastinum and unchanged from prior per chart 

review.


Objective was negative for DVT per radiology





Consultation:


A consultation was placed with Dr. Knight, hospitalist. The case was discussed 

and diagnostics were reviewed. The patient was evaluated in the ER for further 

treatment.





This appears to be consistent with fever most likely from UTI with knee 

effusion and fracture of the proximal fibula.  Patient will be evaluated by 

medicine for possible admission.  She started on broad-spectrum antibiotics.  

Blood cultures pending.  Urine cultures pending.  Patient did have a high white 

count.  She is febrile.  Patient's knee was not erythematous or having obvious 

signs of a septic joint.  She is able to move it.  By the evaluation outlined 

above emergent etiologies such as otitis, pharyngitis, pneumonia, meningitis, 

urinary tract infection,   bacteremia, as well as others were deemed relatively 

unlikely.





The pt informed about the findings as listed above. All questions were answered 

and  pleased with the treatment. 





Case reviewed with my attending.





Medical Decision


As above





Medication Reconcilliation


Current Medication List:  was personally reviewed by me





Blood Pressure Screening


Patient's blood pressure:  Normal blood pressure





Impression





 Primary Impression:  


 SIRS (systemic inflammatory response syndrome)


 Additional Impressions:  


 UTI (urinary tract infection)


 Fever


 Closed fibular fracture


 Knee effusion, left





Departure Information


Dispostion


Being Evaluated By Hospitalist





Condition


FAIR





Referrals


Soham Roche MD (PCP)





Patient Instructions


My [facility]





Problem Qualifiers








 Additional Impressions:  


 UTI (urinary tract infection)


 Urinary tract infection type:  acute cystitis  Hematuria presence:  without 

hematuria  Qualified Codes:  N30.00 - Acute cystitis without hematuria

## 2017-08-15 VITALS
TEMPERATURE: 97.34 F | SYSTOLIC BLOOD PRESSURE: 130 MMHG | OXYGEN SATURATION: 92 % | DIASTOLIC BLOOD PRESSURE: 80 MMHG | HEART RATE: 58 BPM

## 2017-08-15 VITALS
OXYGEN SATURATION: 92 % | SYSTOLIC BLOOD PRESSURE: 130 MMHG | TEMPERATURE: 97.34 F | DIASTOLIC BLOOD PRESSURE: 80 MMHG | HEART RATE: 58 BPM

## 2017-08-15 VITALS — OXYGEN SATURATION: 92 %

## 2017-08-15 LAB
INR PPP: 2.7 (ref 0.9–1.1)
PROTHROMBIN TIME: 29.9 SECONDS (ref 9–12)

## 2017-08-15 RX ADMIN — FUROSEMIDE SCH MG: 80 TABLET ORAL at 08:59

## 2017-08-15 RX ADMIN — ALBUTEROL SULFATE SCH PUFFS: 90 AEROSOL, METERED RESPIRATORY (INHALATION) at 11:22

## 2017-08-15 RX ADMIN — ALBUTEROL SULFATE SCH PUFFS: 90 AEROSOL, METERED RESPIRATORY (INHALATION) at 04:00

## 2017-08-15 RX ADMIN — PANTOPRAZOLE SCH MG: 40 TABLET, DELAYED RELEASE ORAL at 08:59

## 2017-08-15 RX ADMIN — AMIODARONE HYDROCHLORIDE SCH MG: 200 TABLET ORAL at 09:01

## 2017-08-15 RX ADMIN — IMIPENEM AND CILASTATIN SODIUM SCH MLS/HR: 250; 250 INJECTION, POWDER, FOR SOLUTION INTRAVENOUS at 06:43

## 2017-08-15 RX ADMIN — IMIPENEM AND CILASTATIN SODIUM SCH MLS/HR: 250; 250 INJECTION, POWDER, FOR SOLUTION INTRAVENOUS at 00:38

## 2017-08-15 RX ADMIN — POTASSIUM CHLORIDE SCH MEQ: 750 TABLET, EXTENDED RELEASE ORAL at 09:49

## 2017-08-15 RX ADMIN — Medication SCH MG: at 08:59

## 2017-08-15 RX ADMIN — FLUTICASONE PROPIONATE AND SALMETEROL SCH PUFF: 50; 100 POWDER RESPIRATORY (INHALATION) at 08:59

## 2017-08-15 RX ADMIN — ALBUTEROL SULFATE SCH PUFFS: 90 AEROSOL, METERED RESPIRATORY (INHALATION) at 00:28

## 2017-08-15 RX ADMIN — ACETAMINOPHEN PRN MG: 325 TABLET ORAL at 07:42

## 2017-08-15 RX ADMIN — IMIPENEM AND CILASTATIN SODIUM SCH MLS/HR: 250; 250 INJECTION, POWDER, FOR SOLUTION INTRAVENOUS at 11:22

## 2017-08-15 RX ADMIN — ATORVASTATIN CALCIUM SCH MG: 10 TABLET, FILM COATED ORAL at 09:00

## 2017-08-15 RX ADMIN — ALBUTEROL SULFATE SCH PUFFS: 90 AEROSOL, METERED RESPIRATORY (INHALATION) at 07:43

## 2017-08-15 RX ADMIN — METOPROLOL SUCCINATE SCH MG: 25 TABLET, EXTENDED RELEASE ORAL at 09:00

## 2017-08-15 NOTE — DISCHARGE INSTRUCTIONS
Discharge Instructions


Date of Service


Aug 15, 2017.





Admission


Reason for Admission:  Diastolic Chf,Acute On Chronic,Fall,Lt Knee Pain,





Discharge


Discharge Diagnosis / Problem:  L Knee Pain, L Fibular Head Fracture





Discharge Goals


Goal(s):  Decrease discomfort, Improve function, Diagnostic testing, 

Therapeutic intervention





Activity Recommendations


Activity Limitations:  resume your previous activity





.





Instructions / Follow-Up


Instructions / Follow-Up


Please follow-up with Dr. Roche on Thursday, August 17 at 12:45PM


You will be prescribed Tramadol, only take this as needed


Follow-up with Dr. Ervin as an outpatient





Current Hospital Diet


Patient's current hospital diet: AHA Diet (Heart Healthy)





Discharge Diet


Recommended Diet:  AHA Diet (Heart Healthy)





Pending Studies


Studies pending at discharge:  no





Medical Emergencies








.


Who to Call and When:





Medical Emergencies:  If at any time you feel your situation is an emergency, 

please call 911 immediately.





.





Non-Emergent Contact


Non-Emergency issues call your:  Primary Care Provider





.


.








"Provider Documentation" section prepared by Pernell Dunne.








.





VTE Core Measure


Inpt VTE Proph given/why not?:  Warfarin (Coumadin)





[place] Drug Monitoring Program


Search Results:  patient reviewed within database, no issues identified

## 2017-08-15 NOTE — EMERGENCY ROOM VISIT NOTE
ED Visit Note


First contact with patient:  21:58


HPI: Left knee pain and swelling and fevers after dx with left prox fibular fx 

day pta. In setting of recent meniscus injections.


PE: Febrile 38.2 VSS, NAD


NC/AT


RRR, no murmurs


CTAB


Abd soft NT/ND


Ext: left knee with moderate effusion, warth. Able to range knee and bear 

weight although causes pain.


Neuro: grossly intact





Plan: fevers in setting of knee pain, swelling after recent fall with fib fx. 

Leukocytosis, ESR, CrP elevatd. Bld cx drawn. Empric abx. Arthrocentesis 

deferred to ortho considering recent injections. Admit to medicine service.





I reviewed the patient's past medical history, medications, and visit nursing 

notes. I discussed the case with the physician assistant, examined the patient, 

and agree with the findings and plan as documented in the physician assistants 

note.

## 2017-08-15 NOTE — DISCHARGE SUMMARY
Discharge Summary


Date of Service


Aug 15, 2017.





Discharge Summary


Admission Date:


Aug 14, 2017 at 01:30


Discharge Date:  Aug 15, 2017


Discharge Disposition:  Home


Principal Diagnosis:


L Fibular Head Fracture


L Knee Pain


Mechanical Fall





Medication Reconciliation


New Medications:  


Nitrofurantoin Monohyd Macro (Macrobid) 100 Mg Cap


100 MG PO BID for 3 Days, #6 CAP





Tramadol HCl (Tramadol HCl) 50 Mg Tab


50 MG PO Q4H PRN for Pain for 3 Days, #12 TAB





 


Continued Medications:  


Acetaminophen (Tylenol) 325 Mg Tab


325 MG PO UD PRN for Pain, TAB





Albuterol Sulfate (Proair Respiclick) 108 Mcg/Act Aer


2 PUFFS INH Q4H





Amiodarone HCl (Amiodarone HCl) 200 Mg Tab


200 MG PO DAILY





Atorvastatin (Atorvastatin Calcium) 10 Mg Tab


10 MG PO DAILY





Fluticasone Prop/Salmeterol (Advair Diskus 100/50 60 Dose) 1 Ea Aerp


1 PUFF INH BID





Furosemide (Furosemide) 80 Mg Tab


80 MG PO BID





Magnesium Chloride (Slow-Mag Tab) 64 Mg Tabcr


64 MG PO BID, TAB





Metoprolol Succinate (Metoprolol Succinate ER) 25 Mg Tabcr


12.5 MG PO DAILY





Omeprazole (Prilosec) 20 Mg Cap


20 MG PO DAILY, CAP





Potassium Chloride Microencaps (Potassium Chloride Er) 10 Meq Tab


10 MEQ PO BID, TAB





Ranitidine (Zantac) 300 Mg Tab


300 MG PO HS, TAB





Warfarin Sodium (Warfarin Sodium) 5 Mg Tab


5 MG PO 6XWK, TAB


TAKE 5 MG AT BEDTIME EVERY SUNDAY,MONDAY,TUESDAY,WEDNESDAY,FRIDAY AND


 SATURDAY OR AS OTHERWISE DIRECTED TO TAKE BY ANTICOAGULATION


 CLINIC/MD.


Warfarin Sodium (Warfarin Sodium) 5 Mg Tab


7.5 MG PO WK, TAB


TAKE 7.5 MG AT BEDTIME EVERY THURSDAY OR AS OTHERWISE DIRECTED TO


 TAKE BY ANTICOAGULATION CLINIC/MD.








Admission Information


HPI (per Admitting provider):


DATE OF ADMISSION:  08/13/2017


 


PRIMARY CARE PROVIDER:  Dr. Roche.


 


CHIEF COMPLAINT:  Increasing pain in the left knee joint and also complains


of fever without any dysuria.


 


HISTORY OF PRESENT COMPLAINT:  She is an 82-year-old female with significant


past medical history of chronic atrial fibrillation, on anticoagulation,


diastolic heart failure, hypertension, obstructive sleep apnea, on CPAP,


history of asthma, chronic kidney disease and hyperlipidemia.  Apparently had


a fall yesterday and injured her left knee, that was a mechanical fall.  She


came to the ER and she had an x-ray of the left knee that did show


age-indeterminate fibular neck fracture.  She was sent home with pain


medications and Tylenol, and she was advised to make an appointment with


orthopedic doctor.  Dr. Ervin had done injection of her knee joint in the


past.  She was complaining of more pain as of today and she is back but the


swelling is decreased.  She also has some feverish feeling but did not have


any other symptoms suggestive of an infection.


 


In the ER, she was noted to be febrile at 38.2 and her white count was 18.46.


 Her UA is suggestive of infection.  Her knee swelling decreased, but with


fever and UTI and ongoing knee pain, she was admitted to medical floor for


continuation of care.


 


PAST MEDICAL HISTORY:  Chronic atrial fibrillation, on anticoagulation,


diastolic heart failure, hypertension, hyperlipidemia, obstructive sleep


apnea, on CPAP, mild asthma and chronic kidney disease.


 


PAST SURGICAL HISTORY:  Cataract surgery, total abdominal hysterectomy and


tubal removal.


 


FAMILY HISTORY:  Nothing significant to note.


 


SOCIAL HISTORY:  She is .  She lives with her .  She has 2


children.  She does not smoke, does not drink.


 


ALLERGIES:  SHE IS ALLERGIC TO ALCOHOL, CEPHALOSPORIN, CODEINE, PENICILLINS,


ASPIRIN, LEVOFLOXACIN AND SULFA.


 


MEDICATIONS:  As an outpatient, she has been on Tylenol 325 mg as directed,


albuterol sulfate 2 puffs every 4 hourly as needed, amiodarone 200 mg daily,


atorvastatin 10 mg daily, Advair Diskus 100/50 one puff twice daily,


furosemide 80 mg twice daily, magnesium chloride 64 mg b.i.d., Toprol-XL 25


mg tablet 12.5 daily, omeprazole 20 mg daily, potassium chloride 10 mEq


daily, ranitidine 300 mg at night and warfarin sodium 5 mg as directed.


 


REVIEW OF SYSTEMS:  Other systemic review unremarkable except those mentioned


in history of present complaint.


 


PHYSICAL EXAMINATION:


GENERAL:  On examination in the Emergency Room, she was not in any acute


distress.


VITAL SIGNS:  Temperature 38.2 initially, that came down to 36.8, pulse of


67, blood pressure 126/66, saturation 92% on room air.


HEENT:  Unremarkable.


NECK:  Supple.  No JVD, no bruit.


CHEST:  Clear to auscultation bilaterally.


HEART:  S1, S2 regular.


ABDOMEN:  Soft, benign, mildly tender in the hypogastrium.  No organomegaly. 


Bowel sounds present.


EXTREMITIES:  Trace edema bilaterally.  Examination of the left knee joint


did show minimal swelling of the knee joint anteromedially with some


tenderness locally.  Knee movement is moderately painful.  No obvious joint


fluid noted.


CENTRAL NERVOUS SYSTEM:  Alert, awake, oriented x3.  No focal sensory and/or


motor deficit appreciated.


 


LABORATORY DATA:  Noted today, white count was 18.46, H&H 11.9/37.6, platelet


was 265.  Sodium 136, potassium 3.7, chloride 100, carbon dioxide 27, BUN 20,


creatinine 1.30.  Random glucose 100.  Alkaline phosphatase 298.  AST


slightly elevated at 71.  Troponin less than 0.015.  C-reactive protein 2.05.


 ESR was 30.  Prolactin was 0.88.  INR was 2.3.  PT ratio 1.4.  UA


examination suggestive of infection.  Lyme disease negative.


 


Chest x-ray unremarkable.  X-ray of the knee did show indeterminate fracture


of the fibular neck.  Ultrasound negative for any DVT.


 


IMPRESSION AND PLAN:


1.  Urinary tract infection.  The patient will be admitted to medical floor. 


She was started on intravenous daptomycin and imipenem with possibility of


sepsis.Doubt any septic arthritis.  We will continue with imipenem now.  


Blood culture and urine culture have been sent.


2.  Fibular neck fracture on the left side status post fall.  There is some


swelling.  The patient is on Coumadin.  We will ask for orthopedic evaluation


and PT and OT from there.  Doubt any obvious hematoma at this time or any


infection in the joint.


3.  Atrial fibrillation, on anticoagulation.  No acute findings at this time.


 Her EKG is in sinus rhythm with minimal nonspecific ST-T wave changes.


4.  Chronic diastolic heart failure.  Continue with current dose of Lasix.


5.  Hypertension.  Continue with current medications for blood pressure.


6.  Sleep apnea, on CPAP.  The patient can use her own CPAP while in the


hospital.


7.  Asthma, seems to be stable.  Continue with her inhalers.


8.  Gastrointestinal prophylaxis with Protonix and ranitidine.


9.  Deep venous thrombosis prophylaxis, on Coumadin.


10.  Code status.  Full code.





Hospital Course


This is an 82 year old female with a PMH of chronic A. fib on long-term 

anticoagulation, chronic diastolic HF, HTN, CINDY on CPAP, CKD stage 3, presents 

with L knee pain





L Knee Pain


L Fibular Fracture


patient presented to the ER on 8/12 with a L fibular neck fracture, possibly 

acute


states she had a fall causing her L knee pain


developed some swelling around the knee; appreciate ortho input


no need for drainage, conservative management


PT/OT


tramadol PRN added for pain, she is doing well, and ambulating fine, eager to 

go home today





UTI


UA moderate leukocyte esterase


+1 bacteria


three organisms on culture, no further sensitivities


WBC trending down


will change IV abx. to PO Cipro x 5 more days





Chronic A. fib


continue Coumadin, INR at goal between 2-3


continue b-blocker and amiodarone





Diastolic CHF


continue Lasix at home dose





CKD stage 3


at baseline


avoid nephrotoxic agents when able





DVT ppx


Coumadin





FULL CODE


Total time spent on discharge = 45 minutes


This includes examination of the patient, discharge planning, medication 

reconciliation, and communication with other providers.





Discharge Instructions


Please follow-up with Dr. Roche on Thursday, August 17 at 12:45PM


You will be prescribed Tramadol, only take this as needed


Follow-up with Dr. Ervin as an outpatient





Additional Copies To


Soham Roche MD

## 2017-12-30 ENCOUNTER — HOSPITAL ENCOUNTER (EMERGENCY)
Dept: HOSPITAL 45 - C.EDB | Age: 82
Discharge: HOME | End: 2017-12-30
Payer: COMMERCIAL

## 2017-12-30 VITALS
BODY MASS INDEX: 34.98 KG/M2 | HEIGHT: 59.02 IN | BODY MASS INDEX: 34.98 KG/M2 | WEIGHT: 173.5 LBS | HEIGHT: 59.02 IN | WEIGHT: 173.5 LBS

## 2017-12-30 VITALS — HEART RATE: 61 BPM | SYSTOLIC BLOOD PRESSURE: 132 MMHG | DIASTOLIC BLOOD PRESSURE: 79 MMHG

## 2017-12-30 VITALS — OXYGEN SATURATION: 91 %

## 2017-12-30 VITALS — OXYGEN SATURATION: 96 %

## 2017-12-30 VITALS — TEMPERATURE: 97.52 F

## 2017-12-30 DIAGNOSIS — Z79.01: ICD-10-CM

## 2017-12-30 DIAGNOSIS — I48.91: ICD-10-CM

## 2017-12-30 DIAGNOSIS — I50.9: ICD-10-CM

## 2017-12-30 DIAGNOSIS — K21.9: ICD-10-CM
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
## 2017-12-30 DIAGNOSIS — S00.31XA: ICD-10-CM

## 2017-12-30 DIAGNOSIS — J10.1: Primary | ICD-10-CM

## 2017-12-30 DIAGNOSIS — I11.0: ICD-10-CM

## 2017-12-30 DIAGNOSIS — E78.5: ICD-10-CM

## 2017-12-30 DIAGNOSIS — J45.909: ICD-10-CM

## 2017-12-30 DIAGNOSIS — R60.0: ICD-10-CM

## 2017-12-30 DIAGNOSIS — Z82.49: ICD-10-CM

## 2017-12-30 LAB
ALBUMIN SERPL-MCNC: 2.8 GM/DL (ref 3.4–5)
ALP SERPL-CCNC: 269 U/L (ref 45–117)
ALT SERPL-CCNC: 31 U/L (ref 12–78)
AST SERPL-CCNC: 69 U/L (ref 15–37)
BASOPHILS # BLD: 0.03 K/UL (ref 0–0.2)
BASOPHILS NFR BLD: 0.6 %
BUN SERPL-MCNC: 16 MG/DL (ref 7–18)
CALCIUM SERPL-MCNC: 8.3 MG/DL (ref 8.5–10.1)
CO2 SERPL-SCNC: 32 MMOL/L (ref 21–32)
CREAT SERPL-MCNC: 1.15 MG/DL (ref 0.6–1.2)
EOS ABS #: 0 K/UL (ref 0–0.5)
EOSINOPHIL NFR BLD AUTO: 240 K/UL (ref 130–400)
FLUAV RNA SPEC QL NAA+PROBE: (no result)
FLUBV RNA SPEC QL NAA+PROBE: (no result)
GLUCOSE SERPL-MCNC: 92 MG/DL (ref 70–99)
HCT VFR BLD CALC: 38.3 % (ref 37–47)
HGB BLD-MCNC: 12.2 G/DL (ref 12–16)
IG#: 0 K/UL (ref 0–0.02)
IMM GRANULOCYTES NFR BLD AUTO: 17 %
INFLUENZA B ANTIGEN: (no result)
INR PPP: 1.8 (ref 0.9–1.1)
LYMPHOCYTES # BLD: 0.9 K/UL (ref 1.2–3.4)
MCH RBC QN AUTO: 26.6 PG (ref 25–34)
MCHC RBC AUTO-ENTMCNC: 31.9 G/DL (ref 32–36)
MCV RBC AUTO: 83.4 FL (ref 80–100)
MONO ABS #: 0.76 K/UL (ref 0.11–0.59)
MONOCYTES NFR BLD: 14.4 %
NEUT ABS #: 3.59 K/UL (ref 1.4–6.5)
NEUTROPHILS # BLD AUTO: 0 %
NEUTROPHILS NFR BLD AUTO: 68 %
PMV BLD AUTO: 11.3 FL (ref 7.4–10.4)
POTASSIUM SERPL-SCNC: 3.1 MMOL/L (ref 3.5–5.1)
PROT SERPL-MCNC: 7.6 GM/DL (ref 6.4–8.2)
PTT PATIENT: 32.3 SECONDS (ref 21–31)
RED CELL DISTRIBUTION WIDTH CV: 17.6 % (ref 11.5–14.5)
RED CELL DISTRIBUTION WIDTH SD: 53.7 FL (ref 36.4–46.3)
SODIUM SERPL-SCNC: 139 MMOL/L (ref 136–145)
WBC # BLD AUTO: 5.28 K/UL (ref 4.8–10.8)

## 2017-12-30 NOTE — EMERGENCY ROOM VISIT NOTE
History


Report prepared by Satinder:  Fara Mesa


Under the Supervision of:  PRETTY HallO.


First contact with patient:  10:01


Chief Complaint:  CONGESTION


Stated Complaint:  COLD,COUGH





History of Present Illness


The patient is an 82 year old female who presents to the Emergency Room with 

complaints of persistent productive cough for one week PTA. She notes cold like 

symptoms that comes and goes, though the cough has persisted. She has taken 

Robitussin, which has provided relief in her coughing symptoms. She notes 

rhinorrhea and mild shortness of breath. She states that her family members are 

sick as well. She notes the cough is productive with yellow sputum.  She has 

not recently seen her PCP. She notes that she has had the flu in the past. She 

denies using at home oxygen, though wears a CPAP while at home. She has a 

history of atrial fibrillation and hypertension. She denies any history of 

congestive heart failure, pulmonary embolism, or cancer. She denies smoking and 

alcohol use. She denies any chest pain, fevers, abdominal pain, leg pain, leg 

swelling, or weight gain.





   Source of History:  patient


   Onset:  one week PTA


   Position:  other (global )


   Quality:  other (productive cough)


   Timing:  other (persistent)


   Modifying Factors (Relieving):  other (Robitussin)


   Associated Symptoms:  + cough (productive cough with yellow sputum), + SOB (

mild), No fevers, No chest pain, No abdominal pain


Note:


She notes rhinorrhea and positive sick contacts. 


She denies any leg pain, leg swelling, or weight gain.





Review of Systems


See HPI for pertinent positives & negatives. A total of 10 systems reviewed and 

were otherwise negative.





Past Medical & Surgical


Medical Problems:


(1) Asthma


(2) Benign hypertension


(3) Bronchitis


(4) CHF (congestive heart failure)


(5) Edema


(6) Fall


(7) GERD (gastroesophageal reflux disease)


(8) Hiatal hernia


(9) High cholesterol


(10) Left knee pain


(11) CINDY on CPAP


(12) Paroxysmal atrial fibrillation


(13) PNA (pneumonia)


(14) s/p elective cardioversion


(15) Seasonal allergies


(16) UTI (urinary tract infection)


Surgical Problems:


(1) H/O: hysterectomy


(2) S/P cataract surgery








Family History





Cancer


FH: hypertension


Heart disease





Social History


Smoking Status:  Never Smoker


Alcohol Use:  none


Drug Use:  none


Marital Status:  


Housing Status:  lives with significant other


Occupation Status:  employed





Current/Historical Medications


Scheduled


Albuterol Sulfate (Proair Respiclick), 2 PUFFS INH Q4H


Amiodarone HCl (Amiodarone HCl), 200 MG PO DAILY


Atorvastatin (Atorvastatin Calcium), 10 MG PO DAILY


Fluticasone Prop/Salmeterol (Advair Diskus 100/50 60 Dose), 1 PUFF INH BID


Furosemide (Furosemide), 80 MG PO BID


Magnesium Chloride (Slow-Mag Tab), 64 MG PO BID


Metoprolol Succinate (Metoprolol Succinate ER), 12.5 MG PO DAILY


Omeprazole (Prilosec), 20 MG PO DAILY


Potassium Chloride Microencaps (Potassium Chloride Er), 10 MEQ PO BID


Ranitidine (Zantac), 300 MG PO HS


Warfarin Sodium (Warfarin Sodium), 5 MG PO 6XWK


Warfarin Sodium (Warfarin Sodium), 7.5 MG PO WK





Scheduled PRN


Acetaminophen (Tylenol), 325 MG PO UD PRN for Pain


Tramadol HCl (Tramadol HCl), 50 MG PO Q4H PRN for Pain





Allergies


Coded Allergies:  


     Alcohol (Verified  Allergy, Mild, RASH, 8/12/17)


 WIPES OR INGESTED


     Cephalosporins (Verified  Allergy, Mild, RASH, 8/12/17)


     Codeine (Verified  Allergy, Mild, RASH, 8/12/17)


     Penicillins (Verified  Allergy, Mild, RASH, 8/12/17)


     Aspirin (Verified  Allergy, Unknown, RASH, 12/30/17)


     Levofloxacin (Verified  Allergy, Unknown, "BONE PAIN", 8/12/17)


     Sulfa Antibiotics (Verified  Allergy, Unknown, UNKNOWN, 8/12/17)





Physical Exam


Vital Signs











  Date Time  Temp Pulse Resp B/P (MAP) Pulse Ox O2 Delivery O2 Flow Rate FiO2


 


12/30/17 13:57     91   


 


12/30/17 13:45  61 19 132/79 90 Room Air  


 


12/30/17 12:36  65 20 158/83 93 Room Air  


 


12/30/17 11:26     88 Room Air  


 


12/30/17 11:13  68 24 121/78 95 Nasal Cannula 2.0 


 


12/30/17 10:50  77 24 140/72 96 Nasal Cannula 2.0 


 


12/30/17 10:47  68      


 


12/30/17 10:40     96 Nasal Cannula 2.0 


 


12/30/17 10:40     96 Nasal Cannula 2.0 


 


12/30/17 10:36     98 Nasal Cannula 2.0 


 


12/30/17 09:57 36.4 69 20 163/67 90 Room Air  











Physical Exam


GENERAL:  Patient is awake, alert, and in no acute distress. Patient is resting 

comfortably and showing no signs of anxiety


EYES: The conjunctivae are clear.  The pupils are round and reactive. 


EARS, NOSE, MOUTH AND THROAT: The nose is without any evidence of any 

deformity. Mucous membranes are moist tongue is midline. Small abrasion to tip 

of nose, though no active bleeding noted.


NECK: The neck is nontender and supple.


RESPIRATORY: Lung sounds diminished with expiratory wheezing in all fields.  No 

significant tachypnea or conversational dyspnea.


CARDIOVASCULAR:  Regular rate and rhythm noted there no murmurs rubs or gallops 

normal S1 normal S2 


GASTROINTESTINAL: The abdomen is soft. Bowel sounds are present in all 

quadrants. Abdomen is nontender


MUSCULOSKELETAL/EXTREMITIES: There is no evidence of gross deformity full range 

of motion is noted in the hips and shoulders


SKIN: There is no obvious evidence of any rash. There are no petechiae, pallor 

or cyanosis noted. Trace pedal edema bilaterally


NEUROLOGIC:  Patient is awake alert and oriented x3.





Medical Decision & Procedures


ER Provider


Diagnostic Interpretation:


Radiology results as stated below per my review and radiologist interpretation:





CHEST ONE VIEW PORTABLE





CLINICAL HISTORY: 82 years-old Female presenting with EVALUATE RESPIRATORY


DISTRESS.DYSPNEA. 





TECHNIQUE: Portable upright AP view of the chest was obtained.





COMPARISON: 8/13/2017.





FINDINGS:


Abnormal fullness of the right superior mediastinum/right suprahilar region.


Cardiac silhouette enlarged. Pulmonary vascular prominence. Bibasilar hazy


opacities. Bilateral small pleural effusions. No pneumothorax. Osseous


structures normal. Retrocardiac opacity, hiatal hernia versus left atrial


enlargement





IMPRESSION:


1.  Cardiomegaly and volume overload with minimal pulmonary edema and small


bilateral pleural effusions.





2.  Unchanged abnormal contour of the right superior mediastinum/right


suprahilar region. A chest CT from prior comparison is not available and could


be considered if there is clinical concern. This may be vascular.











Electronically signed by:  Bud Umana M.D.


12/30/2017 10:23 AM





Dictated Date/Time:  12/30/2017 10:21 AM





Laboratory Results


12/30/17 09:35








Red Blood Count 4.59, Mean Corpuscular Volume 83.4, Mean Corpuscular Hemoglobin 

26.6, Mean Corpuscular Hemoglobin Concent 31.9, Mean Platelet Volume 11.3, 

Neutrophils (%) (Auto) 68.0, Lymphocytes (%) (Auto) 17.0, Monocytes (%) (Auto) 

14.4, Eosinophils (%) (Auto) 0.0, Basophils (%) (Auto) 0.6, Neutrophils # (Auto

) 3.59, Lymphocytes # (Auto) 0.90, Monocytes # (Auto) 0.76, Eosinophils # (Auto

) 0.00, Basophils # (Auto) 0.03





12/30/17 09:35

















Test


  12/30/17


09:35 12/30/17


10:45 12/30/17


11:07 12/30/17


11:25


 


White Blood Count


  5.28 K/uL


(4.8-10.8) 


  


  


 


 


Red Blood Count


  4.59 M/uL


(4.2-5.4) 


  


  


 


 


Hemoglobin


  12.2 g/dL


(12.0-16.0) 


  


  


 


 


Hematocrit 38.3 % (37-47)    


 


Mean Corpuscular Volume


  83.4 fL


() 


  


  


 


 


Mean Corpuscular Hemoglobin


  26.6 pg


(25-34) 


  


  


 


 


Mean Corpuscular Hemoglobin


Concent 31.9 g/dl


(32-36) 


  


  


 


 


Platelet Count


  240 K/uL


(130-400) 


  


  


 


 


Mean Platelet Volume


  11.3 fL


(7.4-10.4) 


  


  


 


 


Neutrophils (%) (Auto) 68.0 %    


 


Lymphocytes (%) (Auto) 17.0 %    


 


Monocytes (%) (Auto) 14.4 %    


 


Eosinophils (%) (Auto) 0.0 %    


 


Basophils (%) (Auto) 0.6 %    


 


Neutrophils # (Auto)


  3.59 K/uL


(1.4-6.5) 


  


  


 


 


Lymphocytes # (Auto)


  0.90 K/uL


(1.2-3.4) 


  


  


 


 


Monocytes # (Auto)


  0.76 K/uL


(0.11-0.59) 


  


  


 


 


Eosinophils # (Auto)


  0.00 K/uL


(0-0.5) 


  


  


 


 


Basophils # (Auto)


  0.03 K/uL


(0-0.2) 


  


  


 


 


RDW Standard Deviation


  53.7 fL


(36.4-46.3) 


  


  


 


 


RDW Coefficient of Variation


  17.6 %


(11.5-14.5) 


  


  


 


 


Immature Granulocyte % (Auto) 0.0 %    


 


Immature Granulocyte # (Auto)


  0.00 K/uL


(0.00-0.02) 


  


  


 


 


Anion Gap


  5.0 mmol/L


(3-11) 


  


  


 


 


Est Creatinine Clear Calc


Drug Dose 34.8 ml/min 


  


  


  


 


 


Estimated GFR (


American) 51.7 


  


  


  


 


 


Estimated GFR (Non-


American 44.6 


  


  


  


 


 


BUN/Creatinine Ratio 14.3 (10-20)    


 


Calcium Level


  8.3 mg/dl


(8.5-10.1) 


  


  


 


 


Magnesium Level


  2.1 mg/dl


(1.8-2.4) 


  


  


 


 


Total Bilirubin


  1.0 mg/dl


(0.2-1) 


  


  


 


 


Aspartate Amino Transf


(AST/SGOT) 69 U/L (15-37) 


  


  


  


 


 


Alanine Aminotransferase


(ALT/SGPT) 31 U/L (12-78) 


  


  


  


 


 


Alkaline Phosphatase


  269 U/L


() 


  


  


 


 


Troponin I


  0.022 ng/ml


(0-0.045) 


  


  


 


 


Pro-B-Type Natriuretic Peptide


  4824 pg/ml


(0-1800) 


  


  


 


 


Total Protein


  7.6 gm/dl


(6.4-8.2) 


  


  


 


 


Albumin


  2.8 gm/dl


(3.4-5.0) 


  


  


 


 


Globulin


  4.8 gm/dl


(2.5-4.0) 


  


  


 


 


Albumin/Globulin Ratio 0.6 (0.9-2)    


 


Influenza Type A (RT-PCR)


  


  POS for Influ


A (NEG) 


  


 


 


Influenza Type A Antigen


  


  Neg for Influ


A (NEG) 


  


 


 


Influenza Type B Antigen


  


  Neg for Influ


B (NEG) 


  


 


 


Influenza Type B (RT-PCR)


  


  Neg for Influ


B (NEG) 


  


 


 


Prothrombin Time


  


  


  18.8 SECONDS


(9.0-12.0) 


 


 


Prothromb Time International


Ratio 


  


  1.8 (0.9-1.1) 


  


 


 


Activated Partial


Thromboplast Time 


  


  32.3 SECONDS


(21.0-31.0) 


 


 


Partial Thromboplastin Ratio   1.2  


 


Urine Color    DK YELLOW 


 


Urine Appearance    CLEAR (CLEAR) 


 


Urine pH    5.0 (4.5-7.5) 


 


Urine Specific Gravity


  


  


  


  1.020


(1.000-1.030)


 


Urine Protein    TRACE (NEG) 


 


Urine Glucose (UA)    NEG (NEG) 


 


Urine Ketones    NEG (NEG) 


 


Urine Occult Blood    NEG (NEG) 


 


Urine Nitrite    NEG (NEG) 


 


Urine Bilirubin    NEG (NEG) 


 


Urine Urobilinogen    NEG (NEG) 


 


Urine Leukocyte Esterase    NEG (NEG) 


 


Urine WBC (Auto)    1-5 /hpf (0-5) 


 


Urine RBC (Auto)    0-4 /hpf (0-4) 


 


Urine Hyaline Casts (Auto)


  


  


  


  5-10 /lpf


(0-5)


 


Urine Epithelial Cells (Auto)    >30 /lpf (0-5) 


 


Urine Bacteria (Auto)    NEG (NEG) 





Laboratory results per my review.





Medications Administered











 Medications


  (Trade)  Dose


 Ordered  Sig/Diego


 Route  Start Time


 Stop Time Status Last Admin


Dose Admin


 


 Albuterol/


 Ipratropium


  (Duoneb)  3 ml  NOW  STAT


 INH  12/30/17 10:08


 12/30/17 10:10 DC 12/30/17 10:53


3 ML


 


 Potassium Chloride


  (Klor-Con M10)  10 meq  NOW  STAT


 PO  12/30/17 11:08


 12/30/17 11:09 DC 12/30/17 11:13


10 MEQ


 


 Furosemide


  (Lasix Inj)  40 mg  NOW  STAT


 IV  12/30/17 11:17


 12/30/17 11:18 DC 12/30/17 11:26


40 MG


 


 Potassium Chloride


  (Klor-Con M10)  40 meq  NOW  STAT


 PO  12/30/17 11:55


 12/30/17 11:56 DC 12/30/17 12:13


40 MEQ











ECG


Indication:  other (productive cough)


Rate (beats per minute):  69


Rhythm:  normal sinus


Findings:  no ectopy, other (Diffuse T wave flattening noted. )


Change:  no significant change (When compared to 8/14/2017)





ED Course


1007: The patient was evaluated in room A12B. A complete history and physical 

examination were performed. 





1008: Ordered Duoneb 3 ml INH  





1108: Ordered Potassium Chloride 10 meq PO





1112: I reassessed the patient at this time. She is feeling better and resting 

comfortably. 





1117: Ordered Furosemide 40 mg IV





 1151: I spoke with Dr. Olsen, cardiologist. We discussed the patients case. 

The patient will be observed. He states that if the patient improves, she can 

be treated as an outpatient.





1155: Ordered Potassium Chloride 40 meq PO





1212: I reassessed the patient at this time. She is feeling better and resting 

comfortably. I discussed the results and treatment plan with the patient. I 

answered all pertaining questions that she had. She expressed understanding and 

verbalized agreement. The patient will be discharged home.





Medical Decision


Prior records/ancillary studies reviewed.


Triage Nursing notes reviewed.





The patient's history was concerning for respiratory difficulties.





Differential diagnosis:


Etiologies such as infections, reactive airway disease, pneumonia, pneumothorax

, COPD, CHF, cardiac ischemia, pulmonary embolism, musculoskeletal, 

gastrointestinal, as well as others were entertained.





The patient is an 82-year-old female who presented to the emergency department 

for an evaluation of cough. The patient was found to be in some degree of 

congestive heart failure. A review of her previous records does show that she 

has a history of this. The patient also had a positive influenza. At this time 

I do feel her picture presentation today is a mix of congestive heart failure 

as well as influenza. I discussed her case with her primary cardiologist. At 

this time I do feel that she is stable to follow-up as an outpatient. She did 

not wish to stay in the hospital and does not wish to be evaluated by the 

hospitalist. I discussed the patient's laboratory and radiographic studies with 

her. She was encouraged to rest and avoid any strenuous activity. She was also 

encouraged to follow-up with her primary care physician for further evaluation. 

She was also encouraged to return to the emergency department immediately if 

symptoms change worsen or the need arises. She was treated with Lasix. She had 

good diuresis.





Medication Reconcilliation


Current Medication List:  was personally reviewed by me





Blood Pressure Screening


Patient's blood pressure:  Elevated blood pressure


Blood pressure disposition:  Elevated BP felt to be situational





Consults


Time Called:  1146


Consulting Physician:  Dr. Olsen, cardiologist


Returned Call:  1151


I spoke with Dr. Olsen, cardiologist. We discussed the patients case. The 

patient will be observed. He states that if the patient improves, she can be 

treated as an outpatient.





Impression





 Primary Impression:  


 Influenza A


 Additional Impression:  


 CHF (congestive heart failure)





Scribe Attestation


The scribe's documentation has been prepared under my direction and personally 

reviewed by me in its entirety. I confirm that the note above accurately 

reflects all work, treatment, procedures, and medical decision making performed 

by me.





Departure Information


Dispostion


Home / Self-Care





Referrals


Soham Roche MD (PCP)





Forms


HOME CARE DOCUMENTATION FORM,                                                 

               IMPORTANT VISIT INFORMATION





Patient Instructions


ED CHF General, ED Flu, [facility]





Additional Instructions





Continue all medications as prescribed. Rest and avoid any strenuous activity. 

Drink plenty clear liquids. Continue using Motrin and Tylenol for pain. Call 

your family  as well as her primary cardiologist schedule follow-up 

appointment. Return to the emergency department immediately if symptoms change 

worsen or the need arises.





Problem Qualifiers








 Additional Impression:  


 CHF (congestive heart failure)


 Congestive heart failure type:  unspecified congestive heart failure type  

Congestive heart failure chronicity:  unspecified congestive heart failure 

chronicity  Qualified Codes:  I50.9 - Heart failure, unspecified

## 2017-12-30 NOTE — DIAGNOSTIC IMAGING REPORT
CHEST ONE VIEW PORTABLE



CLINICAL HISTORY: 82 years-old Female presenting with EVALUATE RESPIRATORY

DISTRESS.DYSPNEA. 



TECHNIQUE: Portable upright AP view of the chest was obtained.



COMPARISON: 8/13/2017.



FINDINGS:

Abnormal fullness of the right superior mediastinum/right suprahilar region.

Cardiac silhouette enlarged. Pulmonary vascular prominence. Bibasilar hazy

opacities. Bilateral small pleural effusions. No pneumothorax. Osseous

structures normal. Retrocardiac opacity, hiatal hernia versus left atrial

enlargement



IMPRESSION:

1.  Cardiomegaly and volume overload with minimal pulmonary edema and small

bilateral pleural effusions.



2.  Unchanged abnormal contour of the right superior mediastinum/right

suprahilar region. A chest CT from prior comparison is not available and could

be considered if there is clinical concern. This may be vascular.







Electronically signed by:  Bud Umana M.D.

12/30/2017 10:23 AM



Dictated Date/Time:  12/30/2017 10:21 AM

## 2018-01-02 ENCOUNTER — HOSPITAL ENCOUNTER (INPATIENT)
Dept: HOSPITAL 45 - C.EDB | Age: 83
LOS: 2 days | Discharge: HOME | DRG: 291 | End: 2018-01-04
Attending: FAMILY MEDICINE | Admitting: HOSPITALIST
Payer: COMMERCIAL

## 2018-01-02 VITALS — OXYGEN SATURATION: 96 % | HEART RATE: 56 BPM

## 2018-01-02 VITALS — OXYGEN SATURATION: 92 %

## 2018-01-02 VITALS
DIASTOLIC BLOOD PRESSURE: 72 MMHG | OXYGEN SATURATION: 91 % | TEMPERATURE: 97.52 F | HEART RATE: 55 BPM | SYSTOLIC BLOOD PRESSURE: 154 MMHG

## 2018-01-02 VITALS
WEIGHT: 184.09 LBS | BODY MASS INDEX: 37.11 KG/M2 | HEIGHT: 59 IN | WEIGHT: 184.09 LBS | HEIGHT: 59 IN | BODY MASS INDEX: 37.11 KG/M2

## 2018-01-02 DIAGNOSIS — E87.6: ICD-10-CM

## 2018-01-02 DIAGNOSIS — Z79.1: ICD-10-CM

## 2018-01-02 DIAGNOSIS — Z79.899: ICD-10-CM

## 2018-01-02 DIAGNOSIS — J10.1: ICD-10-CM

## 2018-01-02 DIAGNOSIS — R93.7: ICD-10-CM

## 2018-01-02 DIAGNOSIS — N18.3: ICD-10-CM

## 2018-01-02 DIAGNOSIS — J45.901: ICD-10-CM

## 2018-01-02 DIAGNOSIS — Z88.6: ICD-10-CM

## 2018-01-02 DIAGNOSIS — Z82.49: ICD-10-CM

## 2018-01-02 DIAGNOSIS — G47.33: ICD-10-CM

## 2018-01-02 DIAGNOSIS — I48.0: ICD-10-CM

## 2018-01-02 DIAGNOSIS — E78.5: ICD-10-CM

## 2018-01-02 DIAGNOSIS — I50.33: ICD-10-CM

## 2018-01-02 DIAGNOSIS — K21.9: ICD-10-CM

## 2018-01-02 DIAGNOSIS — Z66: ICD-10-CM

## 2018-01-02 DIAGNOSIS — J20.8: ICD-10-CM

## 2018-01-02 DIAGNOSIS — S00.31XA: ICD-10-CM
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
## 2018-01-02 DIAGNOSIS — Z79.01: ICD-10-CM

## 2018-01-02 DIAGNOSIS — I13.0: Primary | ICD-10-CM

## 2018-01-02 LAB
ALBUMIN SERPL-MCNC: 3 GM/DL (ref 3.4–5)
ALP SERPL-CCNC: 245 U/L (ref 45–117)
ALT SERPL-CCNC: 32 U/L (ref 12–78)
AST SERPL-CCNC: 80 U/L (ref 15–37)
BASOPHILS # BLD: 0.03 K/UL (ref 0–0.2)
BASOPHILS NFR BLD: 0.6 %
BUN SERPL-MCNC: 17 MG/DL (ref 7–18)
BUN SERPL-MCNC: 19 MG/DL (ref 7–18)
CALCIUM SERPL-MCNC: 8 MG/DL (ref 8.5–10.1)
CALCIUM SERPL-MCNC: 8.3 MG/DL (ref 8.5–10.1)
CO2 SERPL-SCNC: 32 MMOL/L (ref 21–32)
CO2 SERPL-SCNC: 33 MMOL/L (ref 21–32)
CREAT SERPL-MCNC: 1.13 MG/DL (ref 0.6–1.2)
CREAT SERPL-MCNC: 1.13 MG/DL (ref 0.6–1.2)
EOS ABS #: 0.01 K/UL (ref 0–0.5)
EOSINOPHIL NFR BLD AUTO: 238 K/UL (ref 130–400)
FLUAV RNA SPEC QL NAA+PROBE: (no result)
FLUBV RNA SPEC QL NAA+PROBE: (no result)
GLUCOSE SERPL-MCNC: 74 MG/DL (ref 70–99)
GLUCOSE SERPL-MCNC: 87 MG/DL (ref 70–99)
HCT VFR BLD CALC: 39 % (ref 37–47)
HGB BLD-MCNC: 12.4 G/DL (ref 12–16)
IG#: 0 K/UL (ref 0–0.02)
IMM GRANULOCYTES NFR BLD AUTO: 22 %
INR PPP: 2.3 (ref 0.9–1.1)
LYMPHOCYTES # BLD: 1.12 K/UL (ref 1.2–3.4)
MCH RBC QN AUTO: 26.4 PG (ref 25–34)
MCHC RBC AUTO-ENTMCNC: 31.8 G/DL (ref 32–36)
MCV RBC AUTO: 83 FL (ref 80–100)
MONO ABS #: 0.59 K/UL (ref 0.11–0.59)
MONOCYTES NFR BLD: 11.6 %
NEUT ABS #: 3.33 K/UL (ref 1.4–6.5)
NEUTROPHILS # BLD AUTO: 0.2 %
NEUTROPHILS NFR BLD AUTO: 65.6 %
PMV BLD AUTO: 11 FL (ref 7.4–10.4)
POTASSIUM SERPL-SCNC: 3.1 MMOL/L (ref 3.5–5.1)
POTASSIUM SERPL-SCNC: 3.7 MMOL/L (ref 3.5–5.1)
PROT SERPL-MCNC: 7.2 GM/DL (ref 6.4–8.2)
PTT PATIENT: 34.8 SECONDS (ref 21–31)
RED CELL DISTRIBUTION WIDTH CV: 17.8 % (ref 11.5–14.5)
RED CELL DISTRIBUTION WIDTH SD: 54.1 FL (ref 36.4–46.3)
SODIUM SERPL-SCNC: 136 MMOL/L (ref 136–145)
SODIUM SERPL-SCNC: 137 MMOL/L (ref 136–145)
WBC # BLD AUTO: 5.08 K/UL (ref 4.8–10.8)

## 2018-01-02 RX ADMIN — FLUTICASONE PROPIONATE AND SALMETEROL SCH PUFF: 50; 100 POWDER RESPIRATORY (INHALATION) at 20:21

## 2018-01-02 RX ADMIN — LEVALBUTEROL SCH MG: 1.25 SOLUTION, CONCENTRATE RESPIRATORY (INHALATION) at 21:42

## 2018-01-02 RX ADMIN — GUAIFENESIN SCH MG: 100 SOLUTION ORAL at 20:21

## 2018-01-02 RX ADMIN — GUAIFENESIN SCH MG: 100 SOLUTION ORAL at 16:54

## 2018-01-02 RX ADMIN — RANITIDINE SCH MG: 150 TABLET ORAL at 20:21

## 2018-01-02 RX ADMIN — Medication SCH SPRAYS: at 20:20

## 2018-01-02 RX ADMIN — FUROSEMIDE SCH MLS/MIN: 10 INJECTION, SOLUTION INTRAMUSCULAR; INTRAVENOUS at 18:21

## 2018-01-02 RX ADMIN — IPRATROPIUM BROMIDE SCH MG: 0.5 SOLUTION RESPIRATORY (INHALATION) at 21:42

## 2018-01-02 NOTE — NUR
A/ID: 82 year old female in ED. c/o worsened shortness of breath. BNP 6276. Troponin 0.018. 
Denies chest pain. Possible admission/observation. Admission Assessment done. Code Word/Fall 
Agreement reviewed with patient and spouse, and completed. Continued care in ED by YAJAIRA Fuller.

## 2018-01-02 NOTE — NUR
A: PT IS A&OX4. VSS ON RA. WEARS CPAP AT HS. PT DENIES CHEST PAIN AND SOB. PT IS POSITIVE 
FOR INFLUENZA A. PT IS OOB INDEPENDENTLY TO THE BATHROOM. SINUS ANA LUISA ON MONITOR. NO 
COMPLAINTS AT THIS TIME. PT IS RESTING IN BEDSIDE CHAIR. Q1H ROUNDING. CALL BELL WITHIN 
REACH. WILL CONTINUE TO MONITOR.

## 2018-01-02 NOTE — NUR
A:  Pt is alert and oriented x4.  no complaints of pain or SOB.  lungs diminished 
throughout.  cough is non productive at this time.  SB on the cardiac monitor.  PT had flu 
swab obtained due to previous positive and numerous family members with the flu.  Urine sent 
to lab as well.  vss.  see emr for full rn assessment.  call bell in reach.  will continue 
to monitor the patient.

## 2018-01-02 NOTE — NUR
pt resting in chair.  no complaints of pain or sob.  call bell in reach.  will continue to 
monitor the patient.  SR on cardiac monitor.

## 2018-01-02 NOTE — EMERGENCY ROOM VISIT NOTE
History


Report prepared by Satinder:  Fara Mesa


Under the Supervision of:  Dr. Raymundo Chairez M.D.


First contact with patient:  10:20


Chief Complaint:  SHORTNESS OF BREATH


Stated Complaint:  SOB, HERE SATURDAY


Nursing Triage Summary:  


pt reports sob started  a couple weeks ago was seen here saturday for sx.  has 


productive yellow sputum cough





History of Present Illness


The patient is a 82 year old white female with a past medical history of CHF, 

atrial fibrillation, CINDY, HLD, and HTN who presents to the ED with persistent 

shortness of breath for four days PTA. Positive shortness of breath and nasal 

congestion.  Negative nausea, vomiting, or chest pain. She was recently seen in 

the ED December 30, 2017 for similar symptoms. She notes the shortness of 

breath has not changed since her last visit. She is currently taking Coumadin. 

She uses a CPAP nightly and an inhaler daily. She denies any history of 

smoking.  CHF. She denies any changes in her medications. She denies any 

changes in her weight.





   Source of History:  patient


   Onset:  four days PTA


   Position:  other (global )


   Quality:  other (shortness of breath)


   Timing:  other (persistent )


   Associated Symptoms:  No chest pain, No nausea, No vomiting


Note:


She notes nasal congestion.





Review of Systems


See HPI for pertinent positives and negatives.  A total of ten systems were 

reviewed and were otherwise negative.





Past Medical & Surgical


Medical Problems:


(1) Asthma


(2) Benign hypertension


(3) Bronchitis


(4) CHF (congestive heart failure)


(5) Edema


(6) Fall


(7) GERD (gastroesophageal reflux disease)


(8) Hiatal hernia


(9) High cholesterol


(10) Left knee pain


(11) CINDY on CPAP


(12) Paroxysmal atrial fibrillation


(13) PNA (pneumonia)


(14) s/p elective cardioversion


(15) Seasonal allergies


(16) SOB (shortness of breath)


(17) UTI (urinary tract infection)


Surgical Problems:


(1) H/O: hysterectomy


(2) S/P cataract surgery








Family History





Cancer


FH: hypertension


Heart disease





Social History


Smoking Status:  Never Smoker


Alcohol Use:  none


Drug Use:  none


Marital Status:  


Housing Status:  lives with significant other


Occupation Status:  employed





Current/Historical Medications


Scheduled


Acetaminophen (Acetaminophen), 2 TAB PO Q8


Amiodarone HCl (Amiodarone HCl), 200 MG PO DAILY


Atorvastatin (Atorvastatin Calcium), 1 TAB PO DAILY


Fluticasone Prop/Salmeterol (Advair Diskus 100/50 60 Dose), 1 PUFF INH BID


Fluticasone Propionate (Nasal) (Flonase Allergy Relief), 2 SPRAY BLADIMIR DAILY


Furosemide (Furosemide), 80 MG PO BID


Metoprolol Succinate (Metoprolol Succinate ER), 12.5 MG PO DAILY


Omeprazole (Prilosec), 20 MG PO DAILY


Potassium Chloride Microencaps (Potassium Chloride Er), 10 MEQ PO BID


Ranitidine (Zantac), 300 MG PO HS


Warfarin Sodium (Warfarin Sodium), 5 MG PO 6XWK


Warfarin Sodium (Warfarin Sodium), 7.5 MG PO WK





Scheduled PRN


Albuterol Sulfate (Proair Respiclick), 2 PUFFS INH Q4H PRN for SOB/Wheezing


Tramadol HCl (Tramadol HCl), 50 MG PO Q4H PRN for Pain





Allergies


Coded Allergies:  


     Alcohol (Verified  Allergy, Mild, RASH, 1/2/18)


 WIPES OR INGESTED


     Cephalosporins (Verified  Allergy, Mild, RASH, 1/2/18)


     Codeine (Verified  Allergy, Mild, RASH, 1/2/18)


     Penicillins (Verified  Allergy, Mild, RASH, 1/2/18)


     Aspirin (Verified  Allergy, Unknown, RASH, 1/2/18)


     Levofloxacin (Verified  Allergy, Unknown, "BONE PAIN", 1/2/18)


     Sulfa Antibiotics (Verified  Allergy, Unknown, UNKNOWN, 1/2/18)





Physical Exam


Vital Signs











  Date Time  Temp Pulse Resp B/P (MAP) Pulse Ox O2 Delivery O2 Flow Rate FiO2


 


1/2/18 13:00  59      


 


1/2/18 12:52  59   93   


 


1/2/18 12:22  59 17  90   


 


1/2/18 12:17  65 22 136/76 92 Room Air  


 


1/2/18 12:10     92 Room Air  


 


1/2/18 11:03     96 Room Air  


 


1/2/18 10:05 36.3 63 20 169/78 92 Room Air  











Physical Exam


GENERAL: Awake, alert, well-appearing, NAD


HENT: Normocephalic, atraumatic.


EYES: Normal conjunctiva. Sclera non-icteric.


NECK: Supple. No nuchal rigidity. FROM.


RESPIRATORY: Crackles throughout chest, more pronounced at bases. 


CARDIAC: RRR, no MRG


ABDOMEN: Soft, NTND, BS+


MSK: No chest wall TTP, 2+ pretibial edema B/L LEs


NEURO: GCS 15, CN 2-12 intact, moves all 4s on command


SKIN: No rash or jaundice noted.





Medical Decision & Procedures


ER Provider


Diagnostic Interpretation:


Radiology results as stated below per my review and radiologist interpretation: 





CHEST ONE VIEW PORTABLE





CLINICAL HISTORY: Respiratory distress    





COMPARISON STUDY:  12/30/2017





FINDINGS: The heart remains enlarged. There is persistent unexplained widening


the right superior mediastinum. Mild pulmonary vascular congestion is suspected.


There is no lobar consolidation. Trace pleural effusions are evident.[ 





IMPRESSION: 


1. Stable cardiomegaly, mild vascular congestion, and trace pleural effusions


2. Stable explain widening of the right superior mediastinum











Electronically signed by:  Geo Brennan M.D.


1/2/2018 11:02 AM





Dictated Date/Time:  1/2/2018 11:00 AM





Laboratory Results


1/2/18 10:45








Red Blood Count 4.70, Mean Corpuscular Volume 83.0, Mean Corpuscular Hemoglobin 

26.4, Mean Corpuscular Hemoglobin Concent 31.8, Mean Platelet Volume 11.0, 

Neutrophils (%) (Auto) 65.6, Lymphocytes (%) (Auto) 22.0, Monocytes (%) (Auto) 

11.6, Eosinophils (%) (Auto) 0.2, Basophils (%) (Auto) 0.6, Neutrophils # (Auto

) 3.33, Lymphocytes # (Auto) 1.12, Monocytes # (Auto) 0.59, Eosinophils # (Auto

) 0.01, Basophils # (Auto) 0.03














Test


  1/2/18


00:00 1/2/18


10:45


 


Urine Color YELLOW  


 


Urine Appearance CLEAR (CLEAR)  


 


Urine pH 7.5 (4.5-7.5)  


 


Urine Specific Gravity


  1.010


(1.000-1.030) 


 


 


Urine Protein NEG (NEG)  


 


Urine Glucose (UA) NEG (NEG)  


 


Urine Ketones NEG (NEG)  


 


Urine Occult Blood NEG (NEG)  


 


Urine Nitrite NEG (NEG)  


 


Urine Bilirubin NEG (NEG)  


 


Urine Urobilinogen NEG (NEG)  


 


Urine Leukocyte Esterase NEG (NEG)  


 


White Blood Count


  


  5.08 K/uL


(4.8-10.8)


 


Red Blood Count


  


  4.70 M/uL


(4.2-5.4)


 


Hemoglobin


  


  12.4 g/dL


(12.0-16.0)


 


Hematocrit  39.0 % (37-47) 


 


Mean Corpuscular Volume


  


  83.0 fL


()


 


Mean Corpuscular Hemoglobin


  


  26.4 pg


(25-34)


 


Mean Corpuscular Hemoglobin


Concent 


  31.8 g/dl


(32-36)


 


Platelet Count


  


  238 K/uL


(130-400)


 


Mean Platelet Volume


  


  11.0 fL


(7.4-10.4)


 


Neutrophils (%) (Auto)  65.6 % 


 


Lymphocytes (%) (Auto)  22.0 % 


 


Monocytes (%) (Auto)  11.6 % 


 


Eosinophils (%) (Auto)  0.2 % 


 


Basophils (%) (Auto)  0.6 % 


 


Neutrophils # (Auto)


  


  3.33 K/uL


(1.4-6.5)


 


Lymphocytes # (Auto)


  


  1.12 K/uL


(1.2-3.4)


 


Monocytes # (Auto)


  


  0.59 K/uL


(0.11-0.59)


 


Eosinophils # (Auto)


  


  0.01 K/uL


(0-0.5)


 


Basophils # (Auto)


  


  0.03 K/uL


(0-0.2)


 


RDW Standard Deviation


  


  54.1 fL


(36.4-46.3)


 


RDW Coefficient of Variation


  


  17.8 %


(11.5-14.5)


 


Immature Granulocyte % (Auto)  0.0 % 


 


Immature Granulocyte # (Auto)


  


  0.00 K/uL


(0.00-0.02)


 


Prothrombin Time


  


  23.9 SECONDS


(9.0-12.0)


 


Prothromb Time International


Ratio 


  2.3 (0.9-1.1) 


 


 


Activated Partial


Thromboplast Time 


  34.8 SECONDS


(21.0-31.0)


 


Partial Thromboplastin Ratio  1.3 


 


Est Creatinine Clear Calc


Drug Dose 


  38.9 ml/min 


 


 


Magnesium Level


  


  2.2 mg/dl


(1.8-2.4)


 


Total Bilirubin


  


  0.9 mg/dl


(0.2-1)


 


Aspartate Amino Transf


(AST/SGOT) 


  80 U/L (15-37) 


 


 


Alanine Aminotransferase


(ALT/SGPT) 


  32 U/L (12-78) 


 


 


Alkaline Phosphatase


  


  245 U/L


()


 


Pro-B-Type Natriuretic Peptide


  


  6276 pg/ml


(0-1800)


 


Total Protein


  


  7.2 gm/dl


(6.4-8.2)


 


Albumin


  


  3.0 gm/dl


(3.4-5.0)


 


Globulin


  


  4.2 gm/dl


(2.5-4.0)


 


Albumin/Globulin Ratio  0.7 (0.9-2) 





Laboratory results reviewed by me





Medications Administered











 Medications


  (Trade)  Dose


 Ordered  Sig/Diego


 Route  Start Time


 Stop Time Status Last Admin


Dose Admin


 


 Albuterol/


 Ipratropium


  (Duoneb)  3 ml  NOW  STAT


 INH  1/2/18 10:34


 1/2/18 10:39 DC 1/2/18 10:34


3 ML


 


 Acetaminophen


  (Tylenol Tab)  650 mg  NOW  STAT


 PO  1/2/18 10:34


 1/2/18 10:39 DC 1/2/18 10:34


650 MG


 


 Furosemide 80 mg/


 Syringe  8 ml @ 4


 mls/min  ONE  ONCE


 IV  1/2/18 12:00


 1/2/18 12:01 DC 1/2/18 12:00


4 MLS/MIN











ECG


Indication:  SOB/dyspnea


Rate (beats per minute):  62


Rhythm:  normal sinus


Findings:  T-wave inversion (Anterior, Inferior, and Lateral), other (Prolonged 

QT )


Change:


New changes when compared to 12/30/2017





ED Course


1028: The patient was evaluated in room B12B. A complete history and physical 

exam was performed.





1237: I spoke with Alexsandra Ramirez PA-C. We discussed the 

patients case. The patient will be evaluated by the [facility] 

for further management.





Medical Decision


The patient is a 82 year old white female with a past medical history of CHF, 

atrial fibrillation, CINDY, HLD, and HTN who presents to the ED with persistent 

shortness of breath for four days PTA. 





Prior records/ancillary studies reviewed.


Triage Nursing notes reviewed.





The patient's history was concerning for respiratory difficulties.





Differential diagnosis:


Etiologies such as infections, reactive airway disease, pneumonia, pneumothorax

, COPD, CHF, cardiac ischemia, pulmonary embolism, musculoskeletal, 

gastrointestinal, as well as others were entertained.





Patient was seen and evaluated the bedside.  Patient does have a prior history 

of CHF as well as asthma and CINDY for which she requires CPAP.  Patient does 

have a chronic history of A. fib for which she takes Coumadin.  Patient denies 

any recent changes in her medications.  Patient has noticed a productive 

hacking cough.  Patient has noted some weight gain however she states that this 

weight gain may be related to more cookies being eaten.  Patient denies 

increased salt intake.  Patient has noticed that she has gained some weight 

over the holidays.  Patient denies any chest pains.  Patient did have blood 

work completed, EKG, troponin, BNP, chest x-ray.  Of note patient does take 

Lasix 80 mg by mouth twice a day she states she has had no recent changes in 

any of her medications.





Patient's chest x-ray did show some vascular congestion in addition to 

bilateral pleural effusions.  Patient's BNP was elevated greater than 6000.  

Patient had a negative troponin.  Patient did have an elevated bicarbonate 

which is likely consistent to her prior history of CINDY.  Patient is not altered 

where I believe she needs a blood gas.  Patient does not have an elevated white 

blood cell count.  Patient has normal hemoglobin and platelet count.  Patient's 

EKG did show more diffuse T-wave inversions inferiorly and laterally.  Given 

this I did speak with the hospitalist as she likely had an element of CHF 

exacerbation.  Given her persistent symptoms she was given IV Lasix.  No 

aspirin was given at this time given her prior history.  Patient was 

therapeutic on her Coumadin with an INR between 2 and 3.  Patient was admitted 

to the hospitalist.





Medication Reconcilliation


Current Medication List:  was personally reviewed by me





Blood Pressure Screening


Patient's blood pressure:  Elevated blood pressure


Blood pressure disposition:  Elevated BP felt to be situational





Impression





 Primary Impression:  


 CHF exacerbation


 Additional Impressions:  


 Acute electrocardiogram changes


 Hypokalemia


 SOB (shortness of breath)





Scribe Attestation


The scribe's documentation has been prepared under my direction and personally 

reviewed by me in its entirety. I confirm that the note above accurately 

reflects all work, treatment, procedures, and medical decision making performed 

by me.





Departure Information


Dispostion


Being Evaluated By Hospitalist





Referrals


Soham Roche MD (PCP)





Patient Instructions


[facility]





Problem Qualifiers








 Primary Impression:  


 CHF exacerbation


 Congestive heart failure type:  systolic  Qualified Codes:  I50.23 - Acute on 

chronic systolic (congestive) heart failure

## 2018-01-03 VITALS
OXYGEN SATURATION: 94 % | TEMPERATURE: 97.34 F | HEART RATE: 60 BPM | DIASTOLIC BLOOD PRESSURE: 79 MMHG | SYSTOLIC BLOOD PRESSURE: 136 MMHG

## 2018-01-03 VITALS
OXYGEN SATURATION: 90 % | DIASTOLIC BLOOD PRESSURE: 82 MMHG | TEMPERATURE: 97.16 F | SYSTOLIC BLOOD PRESSURE: 173 MMHG | HEART RATE: 70 BPM

## 2018-01-03 VITALS
OXYGEN SATURATION: 91 % | SYSTOLIC BLOOD PRESSURE: 137 MMHG | TEMPERATURE: 97.7 F | HEART RATE: 58 BPM | DIASTOLIC BLOOD PRESSURE: 69 MMHG

## 2018-01-03 VITALS — OXYGEN SATURATION: 96 % | HEART RATE: 67 BPM

## 2018-01-03 VITALS
SYSTOLIC BLOOD PRESSURE: 113 MMHG | OXYGEN SATURATION: 91 % | TEMPERATURE: 97.7 F | DIASTOLIC BLOOD PRESSURE: 63 MMHG | HEART RATE: 60 BPM

## 2018-01-03 VITALS
TEMPERATURE: 97.52 F | DIASTOLIC BLOOD PRESSURE: 84 MMHG | HEART RATE: 60 BPM | OXYGEN SATURATION: 91 % | SYSTOLIC BLOOD PRESSURE: 148 MMHG

## 2018-01-03 VITALS
OXYGEN SATURATION: 91 % | SYSTOLIC BLOOD PRESSURE: 125 MMHG | TEMPERATURE: 97.52 F | DIASTOLIC BLOOD PRESSURE: 78 MMHG | HEART RATE: 64 BPM

## 2018-01-03 VITALS — OXYGEN SATURATION: 94 % | HEART RATE: 67 BPM

## 2018-01-03 VITALS — HEART RATE: 60 BPM | OXYGEN SATURATION: 95 %

## 2018-01-03 VITALS — OXYGEN SATURATION: 90 % | HEART RATE: 69 BPM

## 2018-01-03 LAB
ALBUMIN SERPL-MCNC: 2.3 GM/DL (ref 3.4–5)
ALP SERPL-CCNC: 200 U/L (ref 45–117)
ALT SERPL-CCNC: 28 U/L (ref 12–78)
AST SERPL-CCNC: 59 U/L (ref 15–37)
BUN SERPL-MCNC: 20 MG/DL (ref 7–18)
CALCIUM SERPL-MCNC: 8.2 MG/DL (ref 8.5–10.1)
CO2 SERPL-SCNC: 29 MMOL/L (ref 21–32)
CREAT SERPL-MCNC: 1.03 MG/DL (ref 0.6–1.2)
EOSINOPHIL NFR BLD AUTO: 191 K/UL (ref 130–400)
GLUCOSE SERPL-MCNC: 101 MG/DL (ref 70–99)
HCT VFR BLD CALC: 33.7 % (ref 37–47)
HGB BLD-MCNC: 10.9 G/DL (ref 12–16)
INR PPP: 3.5 (ref 0.9–1.1)
MCH RBC QN AUTO: 26.5 PG (ref 25–34)
MCHC RBC AUTO-ENTMCNC: 32.3 G/DL (ref 32–36)
MCV RBC AUTO: 82 FL (ref 80–100)
PMV BLD AUTO: 10.9 FL (ref 7.4–10.4)
POTASSIUM SERPL-SCNC: 3.4 MMOL/L (ref 3.5–5.1)
PROT SERPL-MCNC: 6.3 GM/DL (ref 6.4–8.2)
RED CELL DISTRIBUTION WIDTH CV: 17.6 % (ref 11.5–14.5)
RED CELL DISTRIBUTION WIDTH SD: 52.8 FL (ref 36.4–46.3)
SODIUM SERPL-SCNC: 136 MMOL/L (ref 136–145)
WBC # BLD AUTO: 3.17 K/UL (ref 4.8–10.8)

## 2018-01-03 RX ADMIN — FUROSEMIDE SCH MLS/MIN: 10 INJECTION, SOLUTION INTRAMUSCULAR; INTRAVENOUS at 16:36

## 2018-01-03 RX ADMIN — LEVALBUTEROL SCH MG: 1.25 SOLUTION, CONCENTRATE RESPIRATORY (INHALATION) at 02:20

## 2018-01-03 RX ADMIN — Medication SCH SPRAYS: at 12:33

## 2018-01-03 RX ADMIN — IPRATROPIUM BROMIDE SCH MG: 0.5 SOLUTION RESPIRATORY (INHALATION) at 02:20

## 2018-01-03 RX ADMIN — FUROSEMIDE SCH MLS/MIN: 10 INJECTION, SOLUTION INTRAMUSCULAR; INTRAVENOUS at 09:12

## 2018-01-03 RX ADMIN — GUAIFENESIN SCH MG: 100 SOLUTION ORAL at 07:38

## 2018-01-03 RX ADMIN — GUAIFENESIN SCH MG: 100 SOLUTION ORAL at 20:37

## 2018-01-03 RX ADMIN — GUAIFENESIN SCH MG: 100 SOLUTION ORAL at 16:36

## 2018-01-03 RX ADMIN — LEVALBUTEROL SCH MG: 1.25 SOLUTION, CONCENTRATE RESPIRATORY (INHALATION) at 19:17

## 2018-01-03 RX ADMIN — METOPROLOL SUCCINATE SCH MG: 25 TABLET, EXTENDED RELEASE ORAL at 07:35

## 2018-01-03 RX ADMIN — FLUTICASONE PROPIONATE AND SALMETEROL SCH PUFF: 50; 100 POWDER RESPIRATORY (INHALATION) at 07:35

## 2018-01-03 RX ADMIN — IPRATROPIUM BROMIDE SCH MG: 0.5 SOLUTION RESPIRATORY (INHALATION) at 14:27

## 2018-01-03 RX ADMIN — RANITIDINE SCH MG: 150 TABLET ORAL at 20:39

## 2018-01-03 RX ADMIN — LEVALBUTEROL SCH MG: 1.25 SOLUTION, CONCENTRATE RESPIRATORY (INHALATION) at 02:19

## 2018-01-03 RX ADMIN — ATORVASTATIN CALCIUM SCH MG: 40 TABLET, FILM COATED ORAL at 07:36

## 2018-01-03 RX ADMIN — IPRATROPIUM BROMIDE SCH MG: 0.5 SOLUTION RESPIRATORY (INHALATION) at 19:17

## 2018-01-03 RX ADMIN — IPRATROPIUM BROMIDE SCH MG: 0.5 SOLUTION RESPIRATORY (INHALATION) at 02:19

## 2018-01-03 RX ADMIN — FLUTICASONE PROPIONATE AND SALMETEROL SCH PUFF: 50; 100 POWDER RESPIRATORY (INHALATION) at 20:37

## 2018-01-03 RX ADMIN — Medication SCH SPRAYS: at 20:37

## 2018-01-03 RX ADMIN — LEVALBUTEROL SCH MG: 1.25 SOLUTION, CONCENTRATE RESPIRATORY (INHALATION) at 14:27

## 2018-01-03 RX ADMIN — PANTOPRAZOLE SCH MG: 40 TABLET, DELAYED RELEASE ORAL at 07:36

## 2018-01-03 RX ADMIN — Medication SCH SPRAYS: at 07:35

## 2018-01-03 RX ADMIN — GUAIFENESIN SCH MG: 100 SOLUTION ORAL at 12:33

## 2018-01-03 RX ADMIN — Medication SCH SPRAYS: at 16:35

## 2018-01-03 RX ADMIN — FLUTICASONE PROPIONATE SCH SPRAYS: 50 SPRAY, METERED NASAL at 07:35

## 2018-01-03 RX ADMIN — AMIODARONE HYDROCHLORIDE SCH MG: 200 TABLET ORAL at 07:36

## 2018-01-03 NOTE — NUR
ID Note:  Pt a/ox4.  VSS.  Pt reports breathing improved.  Scheduled Neb treatments 
continue. BLE edema also improved. IV Lasix continues. Pt independent to bathroom. SR on 
monitor. Will go home when medically stable. D/c undetermined at this time. Will monitor.

## 2018-01-03 NOTE — PROGRESS NOTE
Subjective


Date of Service:


Theodore 3, 2018.


Subjective


Pt evaluation today including:  conversation w/ patient, physical exam, lab 

review, review of studies, review of inpatient medication list


Saw/examined the patient in room 279


She is doing well today, denies any significant shortness of breath


no chest pain or palpitations


mild swelling of the lower extremities





Problem List


Medical Problems:


(1) Acute electrocardiogram changes


Status: Acute  





(2) Ambulatory dysfunction


Status: Acute  





(3) CHF exacerbation


Status: Acute  





(4) Closed fibular fracture


Status: Acute  





(5) Closed fracture of neck of fibula


Status: Acute  





(6) Fever


Status: Acute  





(7) Knee effusion, left


Status: Acute  





(8) Knee hemarthrosis, left


Status: Acute  





(9) Left fibular fracture


Status: Acute  





(10) Left knee pain


Status: Acute  





(11) Right hip pain


Status: Acute  





(12) SIRS (systemic inflammatory response syndrome)


Status: Acute  





(13) UTI (urinary tract infection)


Status: Acute  











Review of Systems


Constitutional:  No fever, No chills


ENT:  + nasal symptoms, No sore throat


Respiratory:  + cough (improving), + sputum, No wheezing, No shortness of 

breath (resolved), No dyspnea on exertion, No dyspnea at rest, No hemoptysis


Cardiac:  No chest pain, No edema, No palpitations


Abdomen:  No pain, No nausea, No vomiting, No diarrhea





Medications





Current Inpatient Medications








 Medications


  (Trade)  Dose


 Ordered  Sig/Diego


 Route  Start Time


 Stop Time Status Last Admin


Dose Admin


 


 Acetaminophen


  (Tylenol Tab)  650 mg  Q4H  PRN


 PO  1/2/18 13:15


 2/1/18 13:14   


 


 


 Nitroglycerin


  (Nitrostat Tab)  0.4 mg  UD  PRN


 SL  1/2/18 13:15


 2/1/18 13:14   


 


 


 Furosemide 80 mg/


 Syringe  8 ml @ 4


 mls/min  BID17


 IV  1/2/18 17:00


 2/1/18 16:59  1/3/18 09:12


4 MLS/MIN


 


 Amiodarone HCl


  (Cordarone Tab)  200 mg  DAILY


 PO  1/3/18 09:00


 2/2/18 08:59  1/3/18 07:36


200 MG


 


 Atorvastatin


 Calcium


  (Lipitor Tab)  40 mg  DAILY


 PO  1/3/18 09:00


 2/2/18 08:59  1/3/18 07:36


40 MG


 


 Salmeterol


 Xinafoate/


 Fluticasone


  (Advair Diskus


 100/50 Inh)  1 puff  BID


 INH  1/2/18 21:00


 2/1/18 20:59  1/3/18 07:35


1 PUFF


 


 Fluticasone


 Propionate


  (Flonase Nasal


 Spray)  2 sprays  DAILY


 BLADIMIR  1/3/18 09:00


 2/2/18 08:59  1/3/18 07:35


2 SPRAYS


 


 Metoprolol


 Succinate


  (Toprol Xl Tab)  12.5 mg  DAILY


 PO  1/3/18 09:00


 2/2/18 08:59  1/3/18 07:35


12.5 MG


 


 Warfarin Sodium


  (Coumadin Tab)  5 mg  [id number]@1600


 PO  1/2/18 16:00


 2/1/18 15:59 Future hold 1/2/18 16:54


5 MG


 


 Warfarin Sodium


  (Coumadin Tab)  7.5 mg  Th@1600


 PO  1/4/18 16:00


 2/3/18 15:59   


 


 


 Pantoprazole


 Sodium


  (Protonix Tab)  40 mg  DAILY


 PO  1/3/18 09:00


 2/2/18 08:59  1/3/18 07:36


40 MG


 


 Ranitidine HCl


  (zANTac TAB)  300 mg  HS


 PO  1/2/18 21:00


 2/1/18 20:59  1/2/18 20:21


300 MG


 


 Ipratropium


 Bromide


  (Atrovent 0.02%


 0.5MG/2.5ML Neb)  0.5 mg  Q4H  PRN


 INH  1/2/18 14:30


 2/1/18 14:29   


 


 


 Levalbuterol


  (Xopenex 0.63 Mg/


 3 Ml Neb)  0.63 mg  Q4H  PRN


 INH  1/2/18 14:30


 2/1/18 14:29   


 


 


 Ipratropium


 Bromide


  (Atrovent 0.02%


 0.5MG/2.5ML Neb)  0.5 mg  Q6R


 INH  1/2/18 15:00


 2/1/18 14:59  1/3/18 02:20


0.5 MG


 


 Levalbuterol


  (Xopenex 1.25MG/


 0.5ML Neb)  1.25 mg  Q6R


 INH  1/2/18 15:00


 2/1/18 14:59  1/3/18 02:20


1.25 MG


 


 Prednisone


  (PredniSONE TAB)  40 mg  DAILY


 PO  1/3/18 09:00


 2/2/18 08:59  1/3/18 07:36


40 MG


 


 Guaifenesin


  (Robitussin


 Sugar Free Syrup)  100 mg  QID


 PO  1/2/18 17:00


 2/1/18 16:59  1/3/18 12:33


100 MG


 


 Sodium Chloride


  (Ocean Nasal


 Spray)  2 sprays  QID


 NA  1/2/18 17:00


 2/1/18 16:59  1/3/18 12:33


2 SPRAYS


 


 Menthol


  (Nice Christy)  1 christy  PRN  PRN


 PO  1/3/18 02:30


 2/2/18 02:29   


 











Objective


Vital Signs











  Date Time  Temp Pulse Resp B/P (MAP) Pulse Ox O2 Delivery O2 Flow Rate FiO2


 


1/3/18 12:00      Room Air  


 


1/3/18 11:25 36.5 58 18 137/69 (91) 91 Room Air  


 


1/3/18 08:00      Room Air  


 


1/3/18 07:40 36.2 70 18 173/82 (112) 90 Room Air  


 


1/3/18 07:35  67 16  94 Room Air  


 


1/3/18 04:15 36.4 64 20 125/78 (94) 91 Room Air  


 


1/3/18 04:00      Room Air  


 


1/3/18 02:20  69 16  90 Room Air  


 


1/3/18 00:00      Room Air  


 


1/3/18 00:00 36.4 60 20 148/84 (105) 91 Room Air  


 


1/2/18 21:45  56 16  96 Room Air  


 


1/2/18 20:00      Room Air  


 


1/2/18 16:00      Room Air  


 


1/2/18 14:51 36.4 55 20 154/72 (99) 91 Room Air  


 


1/2/18 14:43 36.6 58 18 158/74 90   


 


1/2/18 14:34  58 18 158/74 90   


 


1/2/18 14:23     100 Room Air  


 


1/2/18 14:22  56   90   











Physical Exam


General Appearance:  no apparent distress


Respiratory/Chest:  no respiratory distress, no accessory muscle use, + 

decreased breath sounds


Cardiovascular:  regular rate, rhythm, no edema, no murmur


Abdomen:  normal bowel sounds, non tender, soft


Extremities:  normal range of motion, non-tender, no calf tenderness, + swelling

 (trace pitting edema b/l LE), + pertinent finding


Neurologic/Psychiatric:  no motor/sensory deficits, alert, normal mood/affect





Laboratory Results





Last 24 Hours








Test


  1/2/18


16:53 1/2/18


22:27 1/3/18


06:51


 


Sodium Level 136 mmol/L   136 mmol/L 


 


Potassium Level 3.7 mmol/L   3.4 mmol/L 


 


Chloride Level 99 mmol/L   101 mmol/L 


 


Carbon Dioxide Level 32 mmol/L   29 mmol/L 


 


Anion Gap 5.0 mmol/L   6.0 mmol/L 


 


Blood Urea Nitrogen 17 mg/dl   20 mg/dl 


 


Creatinine 1.13 mg/dl   1.03 mg/dl 


 


Est Creatinine Clear Calc


Drug Dose 38.9 ml/min 


  


  39.6 ml/min 


 


 


Estimated GFR (


American) 52.8 


  


  59.0 


 


 


Estimated GFR (Non-


American 45.5 


  


  50.9 


 


 


BUN/Creatinine Ratio 14.9   19.7 


 


Random Glucose 74 mg/dl   101 mg/dl 


 


Calcium Level 8.3 mg/dl   8.2 mg/dl 


 


Troponin I 0.024 ng/ml  < 0.015 ng/ml  


 


White Blood Count   3.17 K/uL 


 


Red Blood Count   4.11 M/uL 


 


Hemoglobin   10.9 g/dL 


 


Hematocrit   33.7 % 


 


Mean Corpuscular Volume   82.0 fL 


 


Mean Corpuscular Hemoglobin   26.5 pg 


 


Mean Corpuscular Hemoglobin


Concent 


  


  32.3 g/dl 


 


 


RDW Standard Deviation   52.8 fL 


 


RDW Coefficient of Variation   17.6 % 


 


Platelet Count   191 K/uL 


 


Mean Platelet Volume   10.9 fL 


 


Prothrombin Time   36.1 SECONDS 


 


Prothromb Time International


Ratio 


  


  3.5 


 


 


Magnesium Level   2.0 mg/dl 


 


Total Bilirubin   0.6 mg/dl 


 


Aspartate Amino Transf


(AST/SGOT) 


  


  59 U/L 


 


 


Alanine Aminotransferase


(ALT/SGPT) 


  


  28 U/L 


 


 


Alkaline Phosphatase   200 U/L 


 


Total Protein   6.3 gm/dl 


 


Albumin   2.3 gm/dl 


 


Globulin   4.0 gm/dl 


 


Albumin/Globulin Ratio   0.6 











Assessment and Plan


This is an 82 year old female with a PMH of paroxysmal A. fib, chronic 

diastolic CHF, HTN, CINDY on CPAP, asthma, HLD, CKD stage 3 presents with 

worsening cough, Influenza A+ and shortness of breath





Acute on Chronic Diastolic CHF


patient with elevated BNP; increased work of breathing


given IV Lasix 80mg BID, which we can continue for now


I's and O's, though she does not want a Tavarez for accurate output


echo is pending to check EF and diastolic function


continue metoprolol





Acute Asthma Exacerbation


secondary to Viral Bronchitis and Influenza A


patient is positive for Influenza A; no role for Tamiflu at this time


continue neb treatments


continue prednisone 40mg x 5 days total for asthma exacerbation





Paroxysmal A. Fib


currently in NSR


continue b-blocker for rate control


continue amiodarone


continue Coumadin with goal INR of 2-3 (hold on 1/3/18 due to elevated INR)





Abnormal CXR


Widened Mediastinum


outpatient chest CT recommended





HTN


blood pressure stable, continue home medications





HLD


continue statin





DVT ppx


Coumadin





DNR

## 2018-01-03 NOTE — NUR
A: Patient resting in bed. Alert and oriented x 4. Denies chest pain, has shortness of 
breath on exertion and when laying down. Denies pain at this time. VSS. Sinus bradycardia on 
cardiac monitor. Lungs diminished, has nonproductive cough. Right AC saline lock. Scabs on 
arms and face. Independent in room. Will attempt to wear hospital provided CPAP throughout 
the night. Droplet precautions for Influenza A. D/C plans uncertain at this time. Will 
continue to monitor.

## 2018-01-03 NOTE — NUR
A- Patient sitting up in bed eating lunch. Assessment remains unchanged. Call bell within 
reach. Will continue to monitor.

## 2018-01-03 NOTE — NUR
A-Patient sitting on edge of bed. Assessment completed. SR on monitor. No complaints of 
pain. Independent with ambulation and all ADL's. Lungs course with I+E wheezing noted. Call 
bell within reach. Will continue to monitor.

## 2018-01-04 VITALS
SYSTOLIC BLOOD PRESSURE: 130 MMHG | TEMPERATURE: 97.34 F | DIASTOLIC BLOOD PRESSURE: 72 MMHG | HEART RATE: 58 BPM | OXYGEN SATURATION: 93 %

## 2018-01-04 VITALS
OXYGEN SATURATION: 90 % | TEMPERATURE: 97.88 F | DIASTOLIC BLOOD PRESSURE: 69 MMHG | HEART RATE: 64 BPM | SYSTOLIC BLOOD PRESSURE: 111 MMHG

## 2018-01-04 VITALS
HEART RATE: 59 BPM | TEMPERATURE: 96.98 F | OXYGEN SATURATION: 93 % | SYSTOLIC BLOOD PRESSURE: 158 MMHG | DIASTOLIC BLOOD PRESSURE: 79 MMHG

## 2018-01-04 VITALS
TEMPERATURE: 96.98 F | HEART RATE: 59 BPM | SYSTOLIC BLOOD PRESSURE: 158 MMHG | DIASTOLIC BLOOD PRESSURE: 79 MMHG | OXYGEN SATURATION: 93 %

## 2018-01-04 VITALS — HEART RATE: 79 BPM | OXYGEN SATURATION: 91 %

## 2018-01-04 VITALS
HEART RATE: 67 BPM | TEMPERATURE: 97.16 F | OXYGEN SATURATION: 91 % | SYSTOLIC BLOOD PRESSURE: 150 MMHG | DIASTOLIC BLOOD PRESSURE: 77 MMHG

## 2018-01-04 LAB
BUN SERPL-MCNC: 25 MG/DL (ref 7–18)
CALCIUM SERPL-MCNC: 8.1 MG/DL (ref 8.5–10.1)
CO2 SERPL-SCNC: 30 MMOL/L (ref 21–32)
CREAT SERPL-MCNC: 1.1 MG/DL (ref 0.6–1.2)
EOSINOPHIL NFR BLD AUTO: 211 K/UL (ref 130–400)
GLUCOSE SERPL-MCNC: 78 MG/DL (ref 70–99)
HCT VFR BLD CALC: 32.9 % (ref 37–47)
HGB BLD-MCNC: 10.5 G/DL (ref 12–16)
INR PPP: 5.2 (ref 0.9–1.1)
MCH RBC QN AUTO: 25.9 PG (ref 25–34)
MCHC RBC AUTO-ENTMCNC: 31.9 G/DL (ref 32–36)
MCV RBC AUTO: 81 FL (ref 80–100)
PMV BLD AUTO: 11 FL (ref 7.4–10.4)
POTASSIUM SERPL-SCNC: 3.8 MMOL/L (ref 3.5–5.1)
RED CELL DISTRIBUTION WIDTH CV: 17.3 % (ref 11.5–14.5)
RED CELL DISTRIBUTION WIDTH SD: 51.3 FL (ref 36.4–46.3)
SODIUM SERPL-SCNC: 134 MMOL/L (ref 136–145)
WBC # BLD AUTO: 5.19 K/UL (ref 4.8–10.8)

## 2018-01-04 RX ADMIN — GUAIFENESIN SCH MG: 100 SOLUTION ORAL at 13:00

## 2018-01-04 RX ADMIN — AMIODARONE HYDROCHLORIDE SCH MG: 200 TABLET ORAL at 08:19

## 2018-01-04 RX ADMIN — Medication SCH SPRAYS: at 13:00

## 2018-01-04 RX ADMIN — FUROSEMIDE SCH MLS/MIN: 10 INJECTION, SOLUTION INTRAMUSCULAR; INTRAVENOUS at 08:20

## 2018-01-04 RX ADMIN — GUAIFENESIN SCH MG: 100 SOLUTION ORAL at 08:18

## 2018-01-04 RX ADMIN — LEVALBUTEROL SCH MG: 1.25 SOLUTION, CONCENTRATE RESPIRATORY (INHALATION) at 02:36

## 2018-01-04 RX ADMIN — METOPROLOL SUCCINATE SCH MG: 25 TABLET, EXTENDED RELEASE ORAL at 08:18

## 2018-01-04 RX ADMIN — FLUTICASONE PROPIONATE SCH SPRAYS: 50 SPRAY, METERED NASAL at 08:20

## 2018-01-04 RX ADMIN — LEVALBUTEROL SCH MG: 1.25 SOLUTION, CONCENTRATE RESPIRATORY (INHALATION) at 07:26

## 2018-01-04 RX ADMIN — ATORVASTATIN CALCIUM SCH MG: 40 TABLET, FILM COATED ORAL at 08:19

## 2018-01-04 RX ADMIN — Medication SCH SPRAYS: at 08:19

## 2018-01-04 RX ADMIN — IPRATROPIUM BROMIDE SCH MG: 0.5 SOLUTION RESPIRATORY (INHALATION) at 07:26

## 2018-01-04 RX ADMIN — PANTOPRAZOLE SCH MG: 40 TABLET, DELAYED RELEASE ORAL at 08:18

## 2018-01-04 RX ADMIN — FLUTICASONE PROPIONATE AND SALMETEROL SCH PUFF: 50; 100 POWDER RESPIRATORY (INHALATION) at 08:20

## 2018-01-04 RX ADMIN — IPRATROPIUM BROMIDE SCH MG: 0.5 SOLUTION RESPIRATORY (INHALATION) at 02:36

## 2018-01-04 NOTE — NUR
A: Patient resting in bed. Alert and oriented x 4. Denies chest pain, has shortness of 
breath on exertion and when laying down. Denies pain at this time. VSS. Sinus bradycardia 
with sinus arrhythmia on cardiac monitor. Lungs diminished, has nonproductive cough. Right 
AC saline lock. Scabs on arms and face. Independent in room. Will attempt to wear hospital 
provided CPAP throughout the night. Droplet precautions for Influenza A. Possible discharge 
home later today if medically stable. Will continue to monitor.

## 2018-01-04 NOTE — DISCHARGE SUMMARY
Discharge Summary


Date of Service


Jan 4, 2018.





Discharge Summary


Admission Date:


Jan 2, 2018 at 13:21


Discharge Date:  Jan 4, 2018


Discharge Disposition:  Home


Principal Diagnosis:


Acute on Chronic Diastolic CHF


Influenza A


Viral Bronchitis


Paroxysmal A. Fib





Medication Reconciliation


New Medications:  


Lisinopril (Lisinopril) 2.5 Mg Tab


2.5 MG PO DAILY for 30 Days, #30 TABS





Prednisone (Prednisone) 20 Mg Tab


40 MG PO DAILY for 3 Days, #6 TAB





 


Continued Medications:  


Acetaminophen (Acetaminophen) 500 Mg Tab


2 TAB PO Q8 for Pain or Fever for 15 Days, #90 TAB





Albuterol Sulfate (Proair Respiclick) 108 Mcg/Act Aer


2 PUFFS INH Q4H PRN for SOB/Wheezing





Amiodarone HCl (Amiodarone HCl) 200 Mg Tab


200 MG PO DAILY





Atorvastatin (Atorvastatin Calcium) 40 Mg Tab


1 TAB PO DAILY





Fluticasone Prop/Salmeterol (Advair Diskus 100/50 60 Dose) 1 Ea Aerp


1 PUFF INH BID





Fluticasone Propionate (Nasal) (Flonase Allergy Relief) 50 Mcg/Act Spr


2 SPRAY BLADIMIR DAILY





Furosemide (Furosemide) 80 Mg Tab


80 MG PO BID





Metoprolol Succinate (Metoprolol Succinate ER) 25 Mg Tabcr


12.5 MG PO DAILY





Omeprazole (Prilosec) 20 Mg Cap


20 MG PO DAILY, CAP





Potassium Chloride Microencaps (Potassium Chloride Er) 10 Meq Tab


10 MEQ PO BID, TAB





Ranitidine (Zantac) 300 Mg Tab


300 MG PO HS, TAB





Tramadol HCl (Tramadol HCl) 50 Mg Tab


50 MG PO Q4H PRN for Pain for 3 Days, #12 TAB





Warfarin Sodium (Warfarin Sodium) 5 Mg Tab


5 MG PO 6XWK, TAB


TAKE 5 MG AT BEDTIME EVERY SUNDAY,MONDAY,TUESDAY,WEDNESDAY,FRIDAY AND


 SATURDAY OR AS OTHERWISE DIRECTED TO TAKE BY ANTICOAGULATION


 CLINIC/MD.


Warfarin Sodium (Warfarin Sodium) 5 Mg Tab


7.5 MG PO WK, TAB


TAKE 7.5 MG AT BEDTIME EVERY THURSDAY OR AS OTHERWISE DIRECTED TO


 TAKE BY ANTICOAGULATION CLINIC/MD.








Admission Information


HPI (per Admitting provider):


Pt is 81 y/o F with PMH paroxysmal a-fib on coumadin, diastolic HF, HTN, 

hyperlipidemia, CINDY using CPAP, CKD III and asthma presented to ER with c/o 

increased SOB. Pt states past 2-3 weeks with cough productive yellow sputum. 

Initially had rhinorrhea and sore throat which has resolved. Reports family 

members with URI's and influenza recently. States feels like has chest 

congestion and yesterday started with SOB with walking which is unusual for 

her. Pt seen in ER on 12/30/17 and had +influenza swab (no tx) and was treated 

for CHF. She reports having to use her albuterol inhaler 3-4 times a day past 2 

days and states does decrease SOB and cough and chest congestion sensation 

however is limited relief. She admits not using her advair as directed and 

states sometimes forgets to use. Denies CP or palpitations. Reports eating and 

drinking normally. Didn't take her lasix this morning. Denies increased LE 

edema. Denies fever/chills, diaphoresis, N/V/D/C, melena, hematochezia, HA, 

dizziness, syncope, vision changes, neck pain, orthopnea, PND, hemoptysis, 

choking, otalgia, abdominal pain, paresthesias, weakness, rashes, urinary 

symptoms.





In ER today: no leukocytosis, K: 3.1, INR: 2.3, Cr: 1.1 (baseline), BNP: 6276, 

negative troponin, AST: 80 (was 52 11/2017), Alk phos: 245 (was 266 11/2017). 

CXR: stable cardiomegaly, mild vascular congestion, trace pleural effusions. EKG

: NSR, rate 62, L ant fascicular block, prolonged QT-533. Was given Tylenol, 

duoneb tx, lasix 80mg IV and pt reports feels less SOB.





Pt report ASA allergy - rash & hives.





Hx ECHO 2015: EF: 60-65%. Follows with Dr Alvarez cardiology.


Physical Exam (per Admitting):  


   General Appearance:  no apparent distress (sitting up in chair), + obese


   Head:  normocephalic, atraumatic


   Eyes:  normal inspection, PERRL, EOMI, sclerae normal


   ENT:  hearing grossly normal, pharynx normal, + pertinent finding (moist 

mucous membranes)


   Neck:  supple, no JVD, trachea midline


   Respiratory/Chest:  chest non-tender, no respiratory distress, no accessory 

muscle use, + crackles (bases bilatrerally, faint expiratory wheezing scattered 

throughout)


   Cardiovascular:  regular rate, rhythm, no murmur, normal peripheral pulses


   Abdomen/GI:  normal bowel sounds, non tender, soft


   Back:  normal inspection, no CVA tenderness


   Extremities/Musculoskelatal:  no calf tenderness, normal capillary refill, 

non-tender, + pedal edema (1+ bilaterally)


   Neurologic/Psych:  alert, normal mood/affect, oriented x 3


   Skin:  normal color, warm/dry





Hospital Course


This is an 82 year old female with a PMH of paroxysmal A. fib, chronic 

diastolic CHF, HTN, CINDY on CPAP, asthma, HLD, CKD stage 3 presents with 

worsening cough, Influenza A+ and shortness of breath





Acute on Chronic Diastolic CHF


1/4


will d/c today on home dose of Lasix, which is 80mg BID


outpatient f/u with PCP, Dr. Roche on Tuesday, January 9 at 10:45AM


should have outpatient echo


possible outpatient cardiology f/u for change in diuretics or medication


for now, continue metoprolol


I'll add a low dose ACE-I for now





1/3


patient with elevated BNP; increased work of breathing


given IV Lasix 80mg BID, which we can continue for now


I's and O's, though she does not want a Tavarez for accurate output


echo is pending to check EF and diastolic function


continue metoprolol





Acute Asthma Exacerbation


secondary to Viral Bronchitis and Influenza A


patient is positive for Influenza A; no role for Tamiflu at this time


continue neb treatments


continue prednisone 40mg x 5 days total for asthma exacerbation





Paroxysmal A. Fib


currently in NSR


continue b-blocker for rate control


continue amiodarone


continue Coumadin with goal INR of 2-3 (hold on 1/3/18 due to elevated INR)





Abnormal CXR


Widened Mediastinum


outpatient chest CT recommended





HTN


blood pressure stable, continue home medications





HLD


continue statin





DVT ppx


Coumadin





DNR


Total time spent on discharge = 45 minutes


This includes examination of the patient, discharge planning, medication 

reconciliation, and communication with other providers.





Discharge Instructions


Please follow-up with Dr. Roche on Tuesday, January 9 at 10:45AM


* You will be on prednisone (steroid) for 3 more days


* Please hold Coumadin - restart Coumadin on Sunday (January 7) and have your 

INR checked early next week


* You will be started on Lisinopril - follow-up with cardiology as an outpatient


* You should have an echocardiogram next week


* You should have your blood drawn early next week to make sure your 

electrolytes and kidneys are doing okay

## 2018-01-04 NOTE — DISCHARGE INSTRUCTIONS
Discharge Instructions


Date of Service


Jan 4, 2018.





Admission


Reason for Admission:  Chf, Sob





Discharge


Discharge Diagnosis / Problem:  Acute Congestive Heart Failure, Bronchitis, 

Influenza A





Discharge Goals


Goal(s):  Decrease discomfort, Improve function, Diagnostic testing, 

Therapeutic intervention





Activity Recommendations


Activity Limitations:  resume your previous activity





.





Instructions / Follow-Up


Instructions / Follow-Up


Please follow-up with Dr. Roche on Tuesday, January 9 at 10:45AM


* You will be on prednisone (steroid) for 3 more days


* Please hold Coumadin - restart Coumadin on Sunday (January 7) and have your 

INR checked early next week


* You will be started on Lisinopril - follow-up with cardiology as an outpatient


* You should have an echocardiogram next week


* You should have your blood drawn early next week to make sure your 

electrolytes and kidneys are doing okay





Current Hospital Diet


Patient's current hospital diet: AHA Diet (Heart Healthy)





Discharge Diet


Recommended Diet:  AHA Diet (Heart Healthy)





Pending Studies


Studies pending at discharge:  no





Medical Emergencies








.


Who to Call and When:





Medical Emergencies:  If at any time you feel your situation is an emergency, 

please call 911 immediately.





.





Non-Emergent Contact


Non-Emergency issues call your:  Primary Care Provider, Cardiologist





.


.








"Provider Documentation" section prepared by Pernell Dunne.








.





VTE Core Measure


Inpt VTE Proph given/why not?:  Warfarin (Coumadin)

## 2018-01-04 NOTE — NUR
a: pt given discharge instructions. pt verbalizes understanding. reinforced holding coumadin 
until the 7th, she stated she would call coumadin clinic tomorrow. monitor removed. when 
removing iv site, pt got a skin tear from the tape, see skin record for documentation of 
skin tear. pt awaiting  to arrive with clean clothing.

## 2018-01-04 NOTE — PROGRESS NOTE
Subjective


Date of Service:


Jan 4, 2018.


Subjective


Pt evaluation today including:  conversation w/ patient, physical exam, lab 

review, review of studies, review of inpatient medication list


Saw/examined the patient in room 279


She is feeling well; denies significant shortness of breath


+cough persists, occasional sputum production


Denies chest pain





Problem List


Medical Problems:


(1) Acute electrocardiogram changes


Status: Acute  





(2) Ambulatory dysfunction


Status: Acute  





(3) CHF exacerbation


Status: Acute  





(4) Closed fibular fracture


Status: Acute  





(5) Closed fracture of neck of fibula


Status: Acute  





(6) Fever


Status: Acute  





(7) Knee effusion, left


Status: Acute  





(8) Knee hemarthrosis, left


Status: Acute  





(9) Left fibular fracture


Status: Acute  





(10) Left knee pain


Status: Acute  





(11) Right hip pain


Status: Acute  





(12) SIRS (systemic inflammatory response syndrome)


Status: Acute  





(13) UTI (urinary tract infection)


Status: Acute  











Review of Systems


Constitutional:  No fever, No chills


Respiratory:  + cough, + sputum, No wheezing, No shortness of breath, No 

dyspnea on exertion, No dyspnea at rest, No hemoptysis


Cardiac:  No chest pain, No edema, No palpitations





Medications





Current Inpatient Medications








 Medications


  (Trade)  Dose


 Ordered  Sig/Diego


 Route  Start Time


 Stop Time Status Last Admin


Dose Admin


 


 Acetaminophen


  (Tylenol Tab)  650 mg  Q4H  PRN


 PO  1/2/18 13:15


 2/1/18 13:14   


 


 


 Nitroglycerin


  (Nitrostat Tab)  0.4 mg  UD  PRN


 SL  1/2/18 13:15


 2/1/18 13:14   


 


 


 Furosemide 80 mg/


 Syringe  8 ml @ 4


 mls/min  BID17


 IV  1/2/18 17:00


 2/1/18 16:59  1/4/18 08:20


4 MLS/MIN


 


 Amiodarone HCl


  (Cordarone Tab)  200 mg  DAILY


 PO  1/3/18 09:00


 2/2/18 08:59  1/4/18 08:19


200 MG


 


 Atorvastatin


 Calcium


  (Lipitor Tab)  40 mg  DAILY


 PO  1/3/18 09:00


 2/2/18 08:59  1/4/18 08:19


40 MG


 


 Salmeterol


 Xinafoate/


 Fluticasone


  (Advair Diskus


 100/50 Inh)  1 puff  BID


 INH  1/2/18 21:00


 2/1/18 20:59  1/4/18 08:20


1 PUFF


 


 Fluticasone


 Propionate


  (Flonase Nasal


 Spray)  2 sprays  DAILY


 BLADIMIR  1/3/18 09:00


 2/2/18 08:59  1/4/18 08:20


2 SPRAYS


 


 Metoprolol


 Succinate


  (Toprol Xl Tab)  12.5 mg  DAILY


 PO  1/3/18 09:00


 2/2/18 08:59  1/4/18 08:18


12.5 MG


 


 Warfarin Sodium


  (Coumadin Tab)  5 mg  [id number]@1600


 PO  1/2/18 16:00


 2/1/18 15:59 Future hold 1/2/18 16:54


5 MG


 


 Warfarin Sodium


  (Coumadin Tab)  7.5 mg  Th@1600


 PO  1/4/18 16:00


 2/3/18 15:59   


 


 


 Pantoprazole


 Sodium


  (Protonix Tab)  40 mg  DAILY


 PO  1/3/18 09:00


 2/2/18 08:59  1/4/18 08:18


40 MG


 


 Ranitidine HCl


  (zANTac TAB)  300 mg  HS


 PO  1/2/18 21:00


 2/1/18 20:59  1/3/18 20:39


300 MG


 


 Ipratropium


 Bromide


  (Atrovent 0.02%


 0.5MG/2.5ML Neb)  0.5 mg  Q4H  PRN


 INH  1/2/18 14:30


 2/1/18 14:29   


 


 


 Levalbuterol


  (Xopenex 0.63 Mg/


 3 Ml Neb)  0.63 mg  Q4H  PRN


 INH  1/2/18 14:30


 2/1/18 14:29   


 


 


 Ipratropium


 Bromide


  (Atrovent 0.02%


 0.5MG/2.5ML Neb)  0.5 mg  Q6R


 INH  1/2/18 15:00


 2/1/18 14:59  1/4/18 07:26


0.5 MG


 


 Levalbuterol


  (Xopenex 1.25MG/


 0.5ML Neb)  1.25 mg  Q6R


 INH  1/2/18 15:00


 2/1/18 14:59  1/4/18 07:26


1.25 MG


 


 Prednisone


  (PredniSONE TAB)  40 mg  DAILY


 PO  1/3/18 09:00


 2/2/18 08:59  1/4/18 08:19


40 MG


 


 Guaifenesin


  (Robitussin


 Sugar Free Syrup)  100 mg  QID


 PO  1/2/18 17:00


 2/1/18 16:59  1/4/18 08:18


100 MG


 


 Sodium Chloride


  (Ocean Nasal


 Spray)  2 sprays  QID


 NA  1/2/18 17:00


 2/1/18 16:59  1/4/18 08:19


2 SPRAYS


 


 Menthol


  (Nice Christy)  1 christy  PRN  PRN


 PO  1/3/18 02:30


 2/2/18 02:29   


 











Objective


Vital Signs











  Date Time  Temp Pulse Resp B/P (MAP) Pulse Ox O2 Delivery O2 Flow Rate FiO2


 


1/4/18 08:30      Room Air  


 


1/4/18 08:30 36.6 64 19 111/69 (83) 90 Room Air  


 


1/4/18 07:26  79 16  91 Room Air  


 


1/4/18 04:00      Room Air  


 


1/4/18 03:34 36.2 67 20 150/77 (101) 91 Room Air  


 


1/4/18 00:07 36.3 58 18 130/72 (91) 93   


 


1/4/18 00:00      Room Air  


 


1/3/18 23:59  60   95   


 


1/3/18 20:15 36.5 60 18 113/63 (80) 91 Room Air  


 


1/3/18 20:00      Room Air  


 


1/3/18 19:18  67 16  94 Room Air  


 


1/3/18 16:00      Room Air  


 


1/3/18 15:16 36.3 60 18 136/79 (98) 94 Room Air  


 


1/3/18 14:27  67 16  96 Room Air  


 


1/3/18 12:00      Room Air  











Physical Exam


General Appearance:  no apparent distress


Respiratory/Chest:  no respiratory distress, no accessory muscle use, + 

decreased breath sounds


Cardiovascular:  regular rate, rhythm, no murmur


Extremities:  + pertinent finding (+1 pitting edema; b/l LE)


Neurologic/Psychiatric:  no motor/sensory deficits, alert, normal mood/affect





Laboratory Results





Last 24 Hours








Test


  1/4/18


06:57 1/4/18


07:54


 


White Blood Count 5.19 K/uL  


 


Red Blood Count 4.06 M/uL  


 


Hemoglobin 10.5 g/dL  


 


Hematocrit 32.9 %  


 


Mean Corpuscular Volume 81.0 fL  


 


Mean Corpuscular Hemoglobin 25.9 pg  


 


Mean Corpuscular Hemoglobin


Concent 31.9 g/dl 


  


 


 


RDW Standard Deviation 51.3 fL  


 


RDW Coefficient of Variation 17.3 %  


 


Platelet Count 211 K/uL  


 


Mean Platelet Volume 11.0 fL  


 


Sodium Level 134 mmol/L  


 


Potassium Level 3.8 mmol/L  


 


Chloride Level 100 mmol/L  


 


Carbon Dioxide Level 30 mmol/L  


 


Anion Gap 4.0 mmol/L  


 


Blood Urea Nitrogen 25 mg/dl  


 


Creatinine 1.10 mg/dl  


 


Est Creatinine Clear Calc


Drug Dose 37.6 ml/min 


  


 


 


Estimated GFR (


American) 54.5 


  


 


 


Estimated GFR (Non-


American 47.0 


  


 


 


BUN/Creatinine Ratio 22.3  


 


Random Glucose 78 mg/dl  


 


Calcium Level 8.1 mg/dl  


 


Magnesium Level 2.1 mg/dl  


 


Prothrombin Time  53.3 SECONDS 


 


Prothromb Time International


Ratio 


  5.2 


 











Assessment and Plan


This is an 82 year old female with a PMH of paroxysmal A. fib, chronic 

diastolic CHF, HTN, CINDY on CPAP, asthma, HLD, CKD stage 3 presents with 

worsening cough, Influenza A+ and shortness of breath





Acute on Chronic Diastolic CHF


1/4


will d/c today on home dose of Lasix, which is 80mg BID


outpatient f/u with PCP, Dr. Roche on Tuesday, January 9 at 10:45AM


should have outpatient echo


possible outpatient cardiology f/u for change in diuretics or medication


for now, continue metoprolol


I'll add a low dose ACE-I for now





1/3


patient with elevated BNP; increased work of breathing


given IV Lasix 80mg BID, which we can continue for now


I's and O's, though she does not want a Tavarez for accurate output


echo is pending to check EF and diastolic function


continue metoprolol





Acute Asthma Exacerbation


secondary to Viral Bronchitis and Influenza A


patient is positive for Influenza A; no role for Tamiflu at this time


continue neb treatments


continue prednisone 40mg x 5 days total for asthma exacerbation





Paroxysmal A. Fib


currently in NSR


continue b-blocker for rate control


continue amiodarone


continue Coumadin with goal INR of 2-3 (hold on 1/3/18 due to elevated INR)





Abnormal CXR


Widened Mediastinum


outpatient chest CT recommended





HTN


blood pressure stable, continue home medications





HLD


continue statin





DVT ppx


Coumadin





DNR

## 2018-01-28 ENCOUNTER — HOSPITAL ENCOUNTER (INPATIENT)
Dept: HOSPITAL 45 - C.EDB | Age: 83
LOS: 2 days | Discharge: HOME HEALTH | DRG: 813 | End: 2018-01-30
Attending: HOSPITALIST | Admitting: HOSPITALIST
Payer: COMMERCIAL

## 2018-01-28 VITALS — TEMPERATURE: 97.88 F | HEART RATE: 66 BPM | DIASTOLIC BLOOD PRESSURE: 76 MMHG | SYSTOLIC BLOOD PRESSURE: 154 MMHG

## 2018-01-28 VITALS
WEIGHT: 186.29 LBS | HEIGHT: 59 IN | BODY MASS INDEX: 37.56 KG/M2 | BODY MASS INDEX: 37.56 KG/M2 | HEIGHT: 59 IN | WEIGHT: 186.29 LBS

## 2018-01-28 VITALS — OXYGEN SATURATION: 91 %

## 2018-01-28 DIAGNOSIS — I48.0: ICD-10-CM

## 2018-01-28 DIAGNOSIS — T45.515A: ICD-10-CM

## 2018-01-28 DIAGNOSIS — N18.3: ICD-10-CM

## 2018-01-28 DIAGNOSIS — Z79.01: ICD-10-CM

## 2018-01-28 DIAGNOSIS — J45.909: ICD-10-CM

## 2018-01-28 DIAGNOSIS — Z80.52: ICD-10-CM

## 2018-01-28 DIAGNOSIS — R04.2: ICD-10-CM

## 2018-01-28 DIAGNOSIS — K74.60: ICD-10-CM

## 2018-01-28 DIAGNOSIS — E78.5: ICD-10-CM

## 2018-01-28 DIAGNOSIS — I13.0: ICD-10-CM

## 2018-01-28 DIAGNOSIS — Z82.49: ICD-10-CM

## 2018-01-28 DIAGNOSIS — D68.32: Primary | ICD-10-CM

## 2018-01-28 DIAGNOSIS — Z66: ICD-10-CM

## 2018-01-28 DIAGNOSIS — K21.9: ICD-10-CM

## 2018-01-28 DIAGNOSIS — G47.33: ICD-10-CM

## 2018-01-28 DIAGNOSIS — I50.33: ICD-10-CM

## 2018-01-28 DIAGNOSIS — K75.81: ICD-10-CM

## 2018-01-28 DIAGNOSIS — Z79.899: ICD-10-CM

## 2018-01-28 LAB
ALBUMIN SERPL-MCNC: 2.5 GM/DL (ref 3.4–5)
ALP SERPL-CCNC: 262 U/L (ref 45–117)
ALT SERPL-CCNC: 31 U/L (ref 12–78)
AST SERPL-CCNC: 62 U/L (ref 15–37)
BASOPHILS # BLD: 0.03 K/UL (ref 0–0.2)
BASOPHILS NFR BLD: 0.5 %
BUN SERPL-MCNC: 20 MG/DL (ref 7–18)
CALCIUM SERPL-MCNC: 8.5 MG/DL (ref 8.5–10.1)
CO2 SERPL-SCNC: 28 MMOL/L (ref 21–32)
CREAT SERPL-MCNC: 1.61 MG/DL (ref 0.6–1.2)
EOS ABS #: 0.08 K/UL (ref 0–0.5)
EOSINOPHIL NFR BLD AUTO: 322 K/UL (ref 130–400)
GLUCOSE SERPL-MCNC: 108 MG/DL (ref 70–99)
HCT VFR BLD CALC: 36.9 % (ref 37–47)
HGB BLD-MCNC: 11.8 G/DL (ref 12–16)
IG#: 0.01 K/UL (ref 0–0.02)
IMM GRANULOCYTES NFR BLD AUTO: 24.5 %
INR PPP: > 10 (ref 0.9–1.1)
INR PPP: > 10 (ref 0.9–1.1)
LIPASE: 258 U/L (ref 73–393)
LYMPHOCYTES # BLD: 1.57 K/UL (ref 1.2–3.4)
MCH RBC QN AUTO: 26.3 PG (ref 25–34)
MCHC RBC AUTO-ENTMCNC: 32 G/DL (ref 32–36)
MCV RBC AUTO: 82.2 FL (ref 80–100)
MONO ABS #: 0.87 K/UL (ref 0.11–0.59)
MONOCYTES NFR BLD: 13.6 %
NEUT ABS #: 3.85 K/UL (ref 1.4–6.5)
NEUTROPHILS # BLD AUTO: 1.2 %
NEUTROPHILS NFR BLD AUTO: 60 %
PMV BLD AUTO: 10.7 FL (ref 7.4–10.4)
POTASSIUM SERPL-SCNC: 4.1 MMOL/L (ref 3.5–5.1)
PROT SERPL-MCNC: 7.1 GM/DL (ref 6.4–8.2)
PTT PATIENT: 54.3 SECONDS (ref 21–31)
RED CELL DISTRIBUTION WIDTH CV: 18.8 % (ref 11.5–14.5)
RED CELL DISTRIBUTION WIDTH SD: 56.6 FL (ref 36.4–46.3)
SODIUM SERPL-SCNC: 137 MMOL/L (ref 136–145)
WBC # BLD AUTO: 6.41 K/UL (ref 4.8–10.8)

## 2018-01-28 RX ADMIN — FLUTICASONE PROPIONATE AND SALMETEROL SCH PUFF: 50; 100 POWDER RESPIRATORY (INHALATION) at 21:54

## 2018-01-28 RX ADMIN — POTASSIUM CHLORIDE SCH MEQ: 750 TABLET, EXTENDED RELEASE ORAL at 21:55

## 2018-01-28 NOTE — DIAGNOSTIC IMAGING REPORT
LEFT LOWER EXTREMITY VENOUS DOPPLER



HISTORY:      left leg swelling, coughing up blood.



COMPARISON STUDY:  None.



FINDINGS: There is normal compressibility, flow, and augmentation within the

left lower extremity deep venous system.



IMPRESSION:  

No DVT within the left lower extremity.







Electronically signed by:  Cruz Clemens M.D.

1/28/2018 9:23 PM



Dictated Date/Time:  1/28/2018 9:23 PM

## 2018-01-28 NOTE — EMERGENCY ROOM VISIT NOTE
History


Report prepared by Satinder:  Lety Burt


Under the Supervision of:  Dr. Deepak Boyd M.D.


First contact with patient:  18:02


Chief Complaint:  OTHER COMPLAINT


Stated Complaint:  PASSING BLOOD





History of Present Illness


The patient is a 82 year old female who presents to the Emergency Room with 

complaints of constant hemoptysis beginning this morning. The patient states 

that she has had a productive cough over the last few days and this morning she 

began to cough a small amount of bright red blood. She notes that she is on 

Coumadin for atrial fibrillation. She reports that she was seen recently in the 

hospital and had an echocardiogram the other day that was normal. The patient 

complains of shortness of breath, left leg swelling that is new and a dry 

cough. Pt denies LOC, headache, fevers, chills, diaphoresis, visual changes, 

neck pain, chest pain, nausea, vomiting, abdominal pain, back pain, melena, 

urinary symptoms, numbness, weakness, new bruising, lymphadenopathy, rash, or 

other complaints.The patient states that she gets leg swelling intermittently 

but it is usually bilateral leg swelling that is unlike today.





   Source of History:  patient


   Onset:  this morning


   Position:  other (cough)


   Quality:  other (bloody)


   Timing:  constant


   Associated Symptoms:  + cough, + SOB


Note:


Pt complains of a dry throat.





Review of Systems


See HPI for pertinent positives and negatives.  A total of ten systems were 

reviewed and were otherwise negative.





Past Medical & Surgical


Medical Problems:


(1) Asthma


(2) Benign hypertension


(3) Bronchitis


(4) CHF (congestive heart failure)


(5) Edema


(6) Fall


(7) GERD (gastroesophageal reflux disease)


(8) Hiatal hernia


(9) High cholesterol


(10) Left knee pain


(11) CINDY on CPAP


(12) Paroxysmal atrial fibrillation


(13) PNA (pneumonia)


(14) s/p elective cardioversion


(15) Seasonal allergies


(16) SOB (shortness of breath)


(17) UTI (urinary tract infection)


Surgical Problems:


(1) H/O: hysterectomy


(2) S/P cataract surgery








Family History





Cancer


FH: hypertension


Heart disease





Social History


Smoking Status:  Never Smoker


Alcohol Use:  none


Drug Use:  none


Marital Status:  


Housing Status:  lives with significant other





Current/Historical Medications


Scheduled


Amiodarone HCl (Amiodarone HCl), 200 MG PO DAILY


Atorvastatin (Lipitor), 40 MG PO DAILY


Fluticasone Prop/Salmeterol (Advair Diskus 100/50 60 Dose), 1 PUFF INH BID


Furosemide (Furosemide), 80 MG PO BID


Lisinopril (Lisinopril), 2.5 MG PO DAILY


Metoprolol Succinate (Metoprolol Succinate ER), 12.5 MG PO DAILY


Potassium Chloride Microencaps (Potassium Chloride Er), 10 MEQ PO BID


Warfarin Sodium (Warfarin Sodium), 5 MG PO 6XWK


Warfarin Sodium (Warfarin Sodium), 7.5 MG PO WK





Scheduled PRN


Acetaminophen (Acetaminophen), 1,000 MG PO Q8 PRN for Pain or Fever


Albuterol Sulfate (Proair Respiclick), 2 PUFFS INH Q4H PRN for SOB/Wheezing


Fluticasone Propionate (Fluticasone Propionate), 2 SPRAYS BLADIMIR DAILY PRN for 

Allergy Symptoms


Omeprazole (Prilosec), 20 MG PO DAILY PRN for Acid Reflux


Ranitidine (Zantac), 300 MG PO HS PRN for Heart Burn/Acid Reflux


Tramadol HCl (Tramadol HCl), 50 MG PO Q4H PRN for Pain





Allergies


Coded Allergies:  


     Alcohol (Verified  Allergy, Mild, RASH, 1/2/18)


 WIPES OR INGESTED


     Cephalosporins (Verified  Allergy, Mild, RASH, 1/2/18)


     Codeine (Verified  Allergy, Mild, RASH, 1/2/18)


     Penicillins (Verified  Allergy, Mild, RASH, 1/2/18)


     Aspirin (Verified  Allergy, Unknown, RASH, 1/2/18)


     Levofloxacin (Verified  Allergy, Unknown, "BONE PAIN", 1/2/18)


     Sulfa Antibiotics (Verified  Allergy, Unknown, UNKNOWN, 1/2/18)





Physical Exam


Vital Signs











  Date Time  Temp Pulse Resp B/P (MAP) Pulse Ox O2 Delivery O2 Flow Rate FiO2


 


1/28/18 18:10     91 Room Air  


 


1/28/18 17:31 36.4 64 22 144/69 91 Room Air  











Physical Exam


GENERAL: Awake, alert, well-appearing, in no distress, mild cough with moderate 

blood tinged sputum. 


HENT: Normocephalic, atraumatic. Oropharynx unremarkable.


EYES: Normal conjunctiva. Sclera non-icteric.


NECK: Supple. No nuchal rigidity. FROM. No JVD.


RESPIRATORY: Clear to auscultation.


CARDIAC: Borderline bradycardic rate, normal rhythm. Extremities warm and well 

perfused. Pulses equal.


ABDOMEN: Soft, non-distended. No tenderness to palpation. No rebound or 

guarding. No masses.


RECTAL: Deferred.


MUSCULOSKELETAL: Chest examination reveals no tenderness. The back is 

symmetrical on inspection without obvious abnormality. There is no CVA 

tenderness to palpation. No joint edema. 


LOWER EXTREMITIES: 1+ edema, left leg is bigger than right, No discoloration. 


NEURO: Normal sensorium. No sensory or motor deficits noted. 


SKIN: No rash or jaundice noted.





Medical Decision & Procedures


ER Provider


Diagnostic Interpretation:


X-ray: Per my interpretation, radiologist review. 





CHEST ONE VIEW PORTABLE





FINDINGS: The heart remains enlarged. There is persistent widening of the right


superior mediastinum. Mild pulmonary vascular congestion without overt edema.


Small bilateral pleural effusions persist. Old, healed bilateral rib fractures.


No pneumothorax. No new focal lung consolidations. Question nodular density


within the right lateral lung base remain stable and may be due to the old rib


fractures.





IMPRESSION:





1. No change compared to the prior study.


2. Stable cardiomegaly, mild vascular congestion, and small bilateral pleural


effusions.


3. Stable mediastinal widening.





Electronically signed by:  Cruz Clemens M.D.


1/28/2018 6:52 PM





Dictated Date/Time:  1/28/2018 6:48 PM





Laboratory Results


1/28/18 18:09








Red Blood Count 4.49, Mean Corpuscular Volume 82.2, Mean Corpuscular Hemoglobin 

26.3, Mean Corpuscular Hemoglobin Concent 32.0, Mean Platelet Volume 10.7, 

Neutrophils (%) (Auto) 60.0, Lymphocytes (%) (Auto) 24.5, Monocytes (%) (Auto) 

13.6, Eosinophils (%) (Auto) 1.2, Basophils (%) (Auto) 0.5, Neutrophils # (Auto

) 3.85, Lymphocytes # (Auto) 1.57, Monocytes # (Auto) 0.87, Eosinophils # (Auto

) 0.08, Basophils # (Auto) 0.03





1/28/18 18:09

















Test


  1/28/18


18:09 1/28/18


18:59


 


White Blood Count


  6.41 K/uL


(4.8-10.8) 


 


 


Red Blood Count


  4.49 M/uL


(4.2-5.4) 


 


 


Hemoglobin


  11.8 g/dL


(12.0-16.0) 


 


 


Hematocrit 36.9 % (37-47)  


 


Mean Corpuscular Volume


  82.2 fL


() 


 


 


Mean Corpuscular Hemoglobin


  26.3 pg


(25-34) 


 


 


Mean Corpuscular Hemoglobin


Concent 32.0 g/dl


(32-36) 


 


 


Platelet Count


  322 K/uL


(130-400) 


 


 


Mean Platelet Volume


  10.7 fL


(7.4-10.4) 


 


 


Neutrophils (%) (Auto) 60.0 %  


 


Lymphocytes (%) (Auto) 24.5 %  


 


Monocytes (%) (Auto) 13.6 %  


 


Eosinophils (%) (Auto) 1.2 %  


 


Basophils (%) (Auto) 0.5 %  


 


Neutrophils # (Auto)


  3.85 K/uL


(1.4-6.5) 


 


 


Lymphocytes # (Auto)


  1.57 K/uL


(1.2-3.4) 


 


 


Monocytes # (Auto)


  0.87 K/uL


(0.11-0.59) 


 


 


Eosinophils # (Auto)


  0.08 K/uL


(0-0.5) 


 


 


Basophils # (Auto)


  0.03 K/uL


(0-0.2) 


 


 


RDW Standard Deviation


  56.6 fL


(36.4-46.3) 


 


 


RDW Coefficient of Variation


  18.8 %


(11.5-14.5) 


 


 


Immature Granulocyte % (Auto) 0.2 %  


 


Immature Granulocyte # (Auto)


  0.01 K/uL


(0.00-0.02) 


 


 


Prothrombin Time


  > 100.0


SECONDS 


 


 


Prothromb Time International


Ratio > 10.0


(0.9-1.1) 


 


 


Activated Partial


Thromboplast Time 54.3 SECONDS


(21.0-31.0) 


 


 


Partial Thromboplastin Ratio 2.1  


 


Anion Gap


  8.0 mmol/L


(3-11) 


 


 


Est Creatinine Clear Calc


Drug Dose 26.1 ml/min 


  


 


 


Estimated GFR (


American) 34.4 


  


 


 


Estimated GFR (Non-


American 29.7 


  


 


 


BUN/Creatinine Ratio 12.1 (10-20)  


 


Calcium Level


  8.5 mg/dl


(8.5-10.1) 


 


 


Magnesium Level


  2.2 mg/dl


(1.8-2.4) 


 


 


Total Bilirubin


  0.6 mg/dl


(0.2-1) 


 


 


Direct Bilirubin


  0.2 mg/dl


(0-0.2) 


 


 


Aspartate Amino Transf


(AST/SGOT) 62 U/L (15-37) 


  


 


 


Alanine Aminotransferase


(ALT/SGPT) 31 U/L (12-78) 


  


 


 


Alkaline Phosphatase


  262 U/L


() 


 


 


Troponin I


  < 0.015 ng/ml


(0-0.045) 


 


 


Total Protein


  7.1 gm/dl


(6.4-8.2) 


 


 


Albumin


  2.5 gm/dl


(3.4-5.0) 


 


 


Lipase


  258 U/L


() 


 


 


Thyroid Stimulating Hormone


(TSH) 3.810 uIu/ml


(0.300-4.500) 


 





Laboratory results reviewed by me





Medications Administered











 Medications


  (Trade)  Dose


 Ordered  Sig/Diego


 Route  Start Time


 Stop Time Status Last Admin


Dose Admin


 


 Phytonadione


  (Mephyton Tab)  5 mg  NOW  STAT


 PO  1/28/18 18:38


 1/28/18 18:40 DC 1/28/18 18:45


5 MG











ECG


Indication:  SOB/dyspnea


Rate (beats per minute):  57


Rhythm:  normal sinus


Findings:  1st degree AV block, T-wave inversion (Anterior), no ectopy


Comparison ECG Date:  1/4/18


Change:  no significant change





ED Course


1817: The patient was evaluated in room A3. A complete history and physical 

exam was performed.





1838: Mephyton Tab 5mg PO. 





1850: Discussed the patient's case with Dr. Garcia of [facility]. The patient 

will be evaluated for further treatment and disposition.





1859: Upon reexamination, the patient was doing well. I discussed the test 

results and treatment plan with her. The patient will be evaluated for further 

management.





Medical Decision


Prior records/ancillary studies reviewed.


Triage Nursing notes reviewed and agree them.


Additional history obtained from the family.


The patient's history was concerning for coughing up blood.





Differential diagnosis:


Etiologies such as coagulopathy, pneumonia, COPD, reactive airway disease, CHF, 

cardiac ischemia, pulmonary embolism, pneumothorax, malignancy, epistaxis, 

infections, gastrointestinal, as well as others were entertained.  





Physical examination:


As above.  Hemodynamically stable.  Cough present with moderate blood-tinged 

sputum.  No ashli hemoptysis at this time.





ER treatment provided:


Oral vitamin K 5 mg


Monitoring


On reassessment the patient felt better.





Diagnostic interpretation by me:


The electrocardiogram was negative for pathologic change. 





The labs revealed an unremarkable CBC except for mild anemia.  The patient had 

a significant coagulopathy with INR greater than 10.  LFTs minimally elevated.  

Urinalysis pending.





Imaging studies:


Chest x-ray as above.  Ultrasound of the left leg pending.





The patient has a supratherapeutic INR.  She has blood-tinged sputum and mild 

hemoptysis.  She will need treatment in the hospital and monitoring.





Consultation:


A consultation was placed with the [facility] hospitalist. The case was 

discussed and diagnostics were reviewed.  The patient was evaluated in the ER 

for further treatment.





Medication Reconcilliation


Current Medication List:  was personally reviewed by me





Blood Pressure Screening


Patient's blood pressure:  Elevated blood pressure


Blood pressure disposition:  Elevated BP felt to be situational





Consults


Time Called:  1842


Consulting Physician:  Dr. Jose Upton


Returned Call:  1850


Discussed the patient's case. The patient will be evaluated for further 

treatment and disposition by the team.





Impression





 Primary Impression:  


 Supratherapeutic INR


 Additional Impression:  


 Hemoptysis





Scribe Attestation


The scribe's documentation has been prepared under my direction and personally 

reviewed by me in its entirety. I confirm that the note above accurately 

reflects all work, treatment, procedures, and medical decision making performed 

by me.





Departure Information


Dispostion


Being Evaluated By Hospitalist





Referrals


No Doctor, Assigned (PCP)





Patient Instructions


My [facility]





Problem Qualifiers

## 2018-01-28 NOTE — DIAGNOSTIC IMAGING REPORT
CHEST ONE VIEW PORTABLE



HISTORY:      EVALUATE WEAKNESS



COMPARISON: Chest 1/2/2018. Chest 3/5/2014.



FINDINGS: The heart remains enlarged. There is persistent widening of the right

superior mediastinum. Mild pulmonary vascular congestion without overt edema.

Small bilateral pleural effusions persist. Old, healed bilateral rib fractures.

No pneumothorax. No new focal lung consolidations. Question nodular density

within the right lateral lung base remain stable and may be due to the old rib

fractures.



IMPRESSION:



1. No change compared to the prior study.

2. Stable cardiomegaly, mild vascular congestion, and small bilateral pleural

effusions.

3. Stable mediastinal widening.







Electronically signed by:  Cruz Clemens M.D.

1/28/2018 6:52 PM



Dictated Date/Time:  1/28/2018 6:48 PM

## 2018-01-29 VITALS
HEART RATE: 58 BPM | DIASTOLIC BLOOD PRESSURE: 73 MMHG | TEMPERATURE: 98.06 F | OXYGEN SATURATION: 90 % | SYSTOLIC BLOOD PRESSURE: 128 MMHG

## 2018-01-29 VITALS
HEART RATE: 59 BPM | SYSTOLIC BLOOD PRESSURE: 117 MMHG | DIASTOLIC BLOOD PRESSURE: 73 MMHG | TEMPERATURE: 98.42 F | OXYGEN SATURATION: 92 %

## 2018-01-29 VITALS
DIASTOLIC BLOOD PRESSURE: 66 MMHG | TEMPERATURE: 98.06 F | HEART RATE: 57 BPM | SYSTOLIC BLOOD PRESSURE: 118 MMHG | OXYGEN SATURATION: 91 %

## 2018-01-29 VITALS
OXYGEN SATURATION: 93 % | DIASTOLIC BLOOD PRESSURE: 66 MMHG | HEART RATE: 68 BPM | TEMPERATURE: 98.6 F | SYSTOLIC BLOOD PRESSURE: 139 MMHG

## 2018-01-29 VITALS
HEART RATE: 54 BPM | DIASTOLIC BLOOD PRESSURE: 77 MMHG | OXYGEN SATURATION: 95 % | SYSTOLIC BLOOD PRESSURE: 125 MMHG | TEMPERATURE: 97.52 F

## 2018-01-29 VITALS
SYSTOLIC BLOOD PRESSURE: 129 MMHG | DIASTOLIC BLOOD PRESSURE: 74 MMHG | TEMPERATURE: 97.7 F | OXYGEN SATURATION: 90 % | HEART RATE: 71 BPM

## 2018-01-29 LAB
BUN SERPL-MCNC: 21 MG/DL (ref 7–18)
CALCIUM SERPL-MCNC: 8.1 MG/DL (ref 8.5–10.1)
CO2 SERPL-SCNC: 29 MMOL/L (ref 21–32)
CREAT SERPL-MCNC: 1.47 MG/DL (ref 0.6–1.2)
EOSINOPHIL NFR BLD AUTO: 274 K/UL (ref 130–400)
GLUCOSE SERPL-MCNC: 75 MG/DL (ref 70–99)
HCT VFR BLD CALC: 34.1 % (ref 37–47)
HGB BLD-MCNC: 11.1 G/DL (ref 12–16)
INR PPP: 8.2 (ref 0.9–1.1)
MCH RBC QN AUTO: 26.5 PG (ref 25–34)
MCHC RBC AUTO-ENTMCNC: 32.6 G/DL (ref 32–36)
MCV RBC AUTO: 81.4 FL (ref 80–100)
PMV BLD AUTO: 11.1 FL (ref 7.4–10.4)
POTASSIUM SERPL-SCNC: 3.6 MMOL/L (ref 3.5–5.1)
RED CELL DISTRIBUTION WIDTH CV: 18.6 % (ref 11.5–14.5)
RED CELL DISTRIBUTION WIDTH SD: 55.6 FL (ref 36.4–46.3)
SODIUM SERPL-SCNC: 138 MMOL/L (ref 136–145)
WBC # BLD AUTO: 5.32 K/UL (ref 4.8–10.8)

## 2018-01-29 RX ADMIN — AMIODARONE HYDROCHLORIDE SCH MG: 200 TABLET ORAL at 09:00

## 2018-01-29 RX ADMIN — FLUTICASONE PROPIONATE AND SALMETEROL SCH PUFF: 50; 100 POWDER RESPIRATORY (INHALATION) at 08:59

## 2018-01-29 RX ADMIN — FUROSEMIDE SCH MLS/MIN: 10 INJECTION, SOLUTION INTRAMUSCULAR; INTRAVENOUS at 16:25

## 2018-01-29 RX ADMIN — LISINOPRIL SCH MG: 2.5 TABLET ORAL at 09:02

## 2018-01-29 RX ADMIN — ATORVASTATIN CALCIUM SCH MG: 40 TABLET, FILM COATED ORAL at 09:01

## 2018-01-29 RX ADMIN — POTASSIUM CHLORIDE SCH MEQ: 750 TABLET, EXTENDED RELEASE ORAL at 09:00

## 2018-01-29 RX ADMIN — SPIRONOLACTONE SCH MG: 25 TABLET, FILM COATED ORAL at 08:59

## 2018-01-29 RX ADMIN — POTASSIUM CHLORIDE SCH MEQ: 750 TABLET, EXTENDED RELEASE ORAL at 20:49

## 2018-01-29 RX ADMIN — FLUTICASONE PROPIONATE AND SALMETEROL SCH PUFF: 50; 100 POWDER RESPIRATORY (INHALATION) at 20:49

## 2018-01-29 RX ADMIN — FUROSEMIDE SCH MLS/MIN: 10 INJECTION, SOLUTION INTRAMUSCULAR; INTRAVENOUS at 08:59

## 2018-01-29 NOTE — PROGRESS NOTE
Medicine Progress Note


Date & Time of Visit:


Jan 29, 2018 at 15:02.


Subjective


83 yo F presents with SOB and LE swelling and was found to have a 

supratherapeutic INR on admission.  She has a chronic cough that is her norm 

and this has not changed.  She states that the other day, she had burned the 

roof of her mouth and yesterday was pulling some dead skin away from that area, 

which had caused the bleeding.  She denies hemoptysis at this time.  She denies 

any worsening cough or flu symptoms today.  She reports no shortness of breath 

and a decrease in her leg swelling that is significant.  She is tolerating PO 

and is otherwise doing well without symptoms.





Objective





Last 8 Hrs








  Date Time  Temp Pulse Resp B/P (MAP) Pulse Ox O2 Delivery O2 Flow Rate FiO2


 


1/29/18 11:23 36.9 59 18 117/73 (88) 92 Room Air  


 


1/29/18 08:00      Room Air  


 


1/29/18 07:37 36.7 58 18 128/73 (91) 90 Room Air  








Physical Exam:


GEN: WNWD, in no acute distress, alert and appropriate, no conversational 

dyspnea or increased work of breathing.


HEENT: NC/AT, PERRL, normal sclerae, MMM


CARDIO: reg rate, S1/2 heard without m/g/r


LUNGS: CTA bilaterally, no crackles, rales or wheezes, good diaphragmatic 

excursion


ABD: soft, non-tender, non-distended, no rebound or guarding, +BS


EXTREMITY: no LE swelling or edema, extremities are warm and well-perfused


NEURO: CN 2-12 grossly intact


MUSC: 5/5 strength throughout, no gross focal deficits


SKIN:  warm and dry


Laboratory Results:


1/29/18 03:46








1/29/18 03:46

















Test


  1/28/18


18:09 1/28/18


18:59 1/29/18


03:46


 


Immature Granulocyte % (Auto) 0.2 %   


 


White Blood Count


  6.41 K/uL


(4.8-10.8) 


  


 


 


Red Blood Count


  4.49 M/uL


(4.2-5.4) 


  4.19 M/uL


(4.2-5.4)


 


Hemoglobin


  11.8 g/dL


(12.0-16.0) 


  


 


 


Hematocrit 36.9 % (37-47)   


 


Mean Corpuscular Volume


  82.2 fL


() 


  81.4 fL


()


 


Mean Corpuscular Hemoglobin


  26.3 pg


(25-34) 


  26.5 pg


(25-34)


 


Mean Corpuscular Hemoglobin


Concent 32.0 g/dl


(32-36) 


  32.6 g/dl


(32-36)


 


Platelet Count


  322 K/uL


(130-400) 


  


 


 


Mean Platelet Volume


  10.7 fL


(7.4-10.4) 


  11.1 fL


(7.4-10.4)


 


Neutrophils (%) (Auto) 60.0 %   


 


Lymphocytes (%) (Auto) 24.5 %   


 


Monocytes (%) (Auto) 13.6 %   


 


Eosinophils (%) (Auto) 1.2 %   


 


Basophils (%) (Auto) 0.5 %   


 


Neutrophils # (Auto)


  3.85 K/uL


(1.4-6.5) 


  


 


 


Lymphocytes # (Auto)


  1.57 K/uL


(1.2-3.4) 


  


 


 


Monocytes # (Auto)


  0.87 K/uL


(0.11-0.59) 


  


 


 


Eosinophils # (Auto)


  0.08 K/uL


(0-0.5) 


  


 


 


Basophils # (Auto)


  0.03 K/uL


(0-0.2) 


  


 


 


Immature Granulocyte # (Auto)


  0.01 K/uL


(0.00-0.02) 


  


 


 


Activated Partial


Thromboplast Time 54.3 SECONDS


(21.0-31.0) 


  


 


 


Partial Thromboplastin Ratio 2.1   


 


Total Bilirubin


  0.6 mg/dl


(0.2-1) 


  


 


 


Direct Bilirubin


  0.2 mg/dl


(0-0.2) 


  


 


 


Aspartate Amino Transf


(AST/SGOT) 62 U/L (15-37) 


  


  


 


 


Alanine Aminotransferase


(ALT/SGPT) 31 U/L (12-78) 


  


  


 


 


Alkaline Phosphatase


  262 U/L


() 


  


 


 


Troponin I


  < 0.015 ng/ml


(0-0.045) 


  


 


 


Total Protein


  7.1 gm/dl


(6.4-8.2) 


  


 


 


Albumin


  2.5 gm/dl


(3.4-5.0) 


  


 


 


Lipase


  258 U/L


() 


  


 


 


Thyroid Stimulating Hormone


(TSH) 3.810 uIu/ml


(0.300-4.500) 


  


 


 


Urine Color  YELLOW  


 


Urine Appearance  CLOUDY (CLEAR)  


 


Urine pH  5.0 (4.5-7.5)  


 


Urine Specific Gravity


  


  1.014


(1.000-1.030) 


 


 


Urine Protein  NEG (NEG)  


 


Urine Glucose (UA)  NEG (NEG)  


 


Urine Ketones  NEG (NEG)  


 


Urine Occult Blood  NEG (NEG)  


 


Urine Nitrite  NEG (NEG)  


 


Urine Bilirubin  NEG (NEG)  


 


Urine Urobilinogen  NEG (NEG)  


 


Urine Leukocyte Esterase  TRACE (NEG)  


 


Urine WBC (Auto)  1-5 /hpf (0-5)  


 


Urine RBC (Auto)  0-4 /hpf (0-4)  


 


Urine Hyaline Casts (Auto)


  


  5-10 /lpf


(0-5) 


 


 


Urine Epithelial Cells (Auto)


  


  20-30 /lpf


(0-5) 


 


 


Urine Bacteria (Auto)  NEG (NEG)  


 


RDW Standard Deviation


  


  


  55.6 fL


(36.4-46.3)


 


RDW Coefficient of Variation


  


  


  18.6 %


(11.5-14.5)


 


Prothrombin Time


  


  


  82.3 SECONDS


(9.0-12.0)


 


Prothromb Time International


Ratio 


  


  8.2 (0.9-1.1) 


 


 


Anion Gap


  


  


  6.0 mmol/L


(3-11)


 


Est Creatinine Clear Calc


Drug Dose 


  


  28.3 ml/min 


 


 


Estimated GFR (


American) 


  


  38.4 


 


 


Estimated GFR (Non-


American 


  


  33.1 


 


 


BUN/Creatinine Ratio   14.4 (10-20) 


 


Calcium Level


  


  


  8.1 mg/dl


(8.5-10.1)


 


Magnesium Level


  


  


  2.1 mg/dl


(1.8-2.4)














 Date/Time


Source Procedure


Growth Status


 


 


 1/28/18 18:53


Blood Blood Culture


Pending Received


 


 1/28/18 18:59


Urine , Clean Catch Urine Culture - Preliminary


PIN-POINT GROWTH PRESENT, REINCUBATING. Resulted








Last 24 Hours








Test


  1/28/18


18:09 1/28/18


18:59 1/28/18


22:09 1/29/18


03:46


 


White Blood Count 6.41 K/uL    5.32 K/uL 


 


Red Blood Count 4.49 M/uL    4.19 M/uL 


 


Hemoglobin 11.8 g/dL    11.1 g/dL 


 


Hematocrit 36.9 %    34.1 % 


 


Mean Corpuscular Volume 82.2 fL    81.4 fL 


 


Mean Corpuscular Hemoglobin 26.3 pg    26.5 pg 


 


Mean Corpuscular Hemoglobin


Concent 32.0 g/dl 


  


  


  32.6 g/dl 


 


 


Platelet Count 322 K/uL    274 K/uL 


 


Mean Platelet Volume 10.7 fL    11.1 fL 


 


Neutrophils (%) (Auto) 60.0 %    


 


Lymphocytes (%) (Auto) 24.5 %    


 


Monocytes (%) (Auto) 13.6 %    


 


Eosinophils (%) (Auto) 1.2 %    


 


Basophils (%) (Auto) 0.5 %    


 


Neutrophils # (Auto) 3.85 K/uL    


 


Lymphocytes # (Auto) 1.57 K/uL    


 


Monocytes # (Auto) 0.87 K/uL    


 


Eosinophils # (Auto) 0.08 K/uL    


 


Basophils # (Auto) 0.03 K/uL    


 


RDW Standard Deviation 56.6 fL    55.6 fL 


 


RDW Coefficient of Variation 18.8 %    18.6 % 


 


Immature Granulocyte % (Auto) 0.2 %    


 


Immature Granulocyte # (Auto) 0.01 K/uL    


 


Prothrombin Time


  > 100.0


SECONDS 


  > 100.0


SECONDS 82.3 SECONDS 


 


 


Prothromb Time International


Ratio > 10.0 


  


  > 10.0 


  8.2 


 


 


Activated Partial


Thromboplast Time 54.3 SECONDS 


  


  


  


 


 


Partial Thromboplastin Ratio 2.1    


 


Sodium Level 137 mmol/L    138 mmol/L 


 


Potassium Level 4.1 mmol/L    3.6 mmol/L 


 


Chloride Level 102 mmol/L    103 mmol/L 


 


Carbon Dioxide Level 28 mmol/L    29 mmol/L 


 


Anion Gap 8.0 mmol/L    6.0 mmol/L 


 


Blood Urea Nitrogen 20 mg/dl    21 mg/dl 


 


Creatinine 1.61 mg/dl    1.47 mg/dl 


 


Est Creatinine Clear Calc


Drug Dose 26.1 ml/min 


  


  


  28.3 ml/min 


 


 


Estimated GFR (


American) 34.4 


  


  


  38.4 


 


 


Estimated GFR (Non-


American 29.7 


  


  


  33.1 


 


 


BUN/Creatinine Ratio 12.1    14.4 


 


Random Glucose 108 mg/dl    75 mg/dl 


 


Calcium Level 8.5 mg/dl    8.1 mg/dl 


 


Magnesium Level 2.2 mg/dl    2.1 mg/dl 


 


Total Bilirubin 0.6 mg/dl    


 


Direct Bilirubin 0.2 mg/dl    


 


Aspartate Amino Transf


(AST/SGOT) 62 U/L 


  


  


  


 


 


Alanine Aminotransferase


(ALT/SGPT) 31 U/L 


  


  


  


 


 


Alkaline Phosphatase 262 U/L    


 


Troponin I < 0.015 ng/ml    


 


Total Protein 7.1 gm/dl    


 


Albumin 2.5 gm/dl    


 


Lipase 258 U/L    


 


Thyroid Stimulating Hormone


(TSH) 3.810 uIu/ml 


  


  


  


 


 


Urine Color  YELLOW   


 


Urine Appearance  CLOUDY   


 


Urine pH  5.0   


 


Urine Specific Gravity  1.014   


 


Urine Protein  NEG   


 


Urine Glucose (UA)  NEG   


 


Urine Ketones  NEG   


 


Urine Occult Blood  NEG   


 


Urine Nitrite  NEG   


 


Urine Bilirubin  NEG   


 


Urine Urobilinogen  NEG   


 


Urine Leukocyte Esterase  TRACE   


 


Urine WBC (Auto)  1-5 /hpf   


 


Urine RBC (Auto)  0-4 /hpf   


 


Urine Hyaline Casts (Auto)  5-10 /lpf   


 


Urine Epithelial Cells (Auto)  20-30 /lpf   


 


Urine Bacteria (Auto)  NEG   














 Date/Time


Source Procedure


Growth Status


 


 


 1/28/18 18:53


Blood Blood Culture


Pending Received


 


 1/28/18 18:50


Blood Blood Culture


Pending Received


 


 1/28/18 18:59


Urine , Clean Catch Urine Culture - Preliminary


PIN-POINT GROWTH PRESENT, REINCUBATING. Resulted











Assessment & Plan


83 yo F presents with SOB and LE swelling and was found to have a 

supratherapeutic INR on admission.  She has a chronic cough that is her norm 

and this has not changed.  She states that the other day, she had burned the 

roof of her mouth and yesterday was pulling some dead skin away from that area, 

which had caused the bleeding.  She denies hemoptysis at this time.  She denies 

any worsening cough or flu symptoms today.  She reports no shortness of breath 

and a decrease in her leg swelling that is significant.  She is tolerating PO 

and is otherwise doing well without symptoms.





1.  Supratherapeutic INR with recent trauma to roof of mouth.  No hemopthysis 

is evident at this time and there has been no significant fall in her H/H.  She 

is breathing well and with no events on telemetry overnight, will transfer her 

to the floor.  Giving additional vit K 5mg PO now and will repeat INR in am.  

Etiology appears to be coumadin use in the setting of a new diagnosis of 

cirrhosis (multiple factor deficiencies) based on outpatient CT scan of the 

chest earlier this month.  She was also found to have a fractured rib without 

trauma and is being worked up for a protein gammopathy, also.


2.  Acute on chronic diastolic heart failure-compensated on IV Lasix overnight. 

Will cont with this for one more day and will consider switching back to her PO 

Lasix tomorrow.


3.  Atrial fibrillation-chronic, stable, asymptomatic.  Sinus bradycardia on 

admission.  No events on telemetry overnight.  On amio and coumadin, 

supratherapeutic. Plan as above. 


4.  HTN-controlled, cont Lisinopril and Metoprolol.


5.  Obstructive sleep apnea, on CPAP.  We will continue her CPAP at night.


6.  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 

hemorrhage.  Vit K as above.  Repeat INR and PRP in am.  


7.  Cirrhosis-does not appear to be decompensated, may be contributing to 

elevated INR. 





DVT proph-coumadin


DNR


Dispo-transfer to med/surg, likely home in 1-2 days, pending PT/OT evaluations.





Sole Stevens DO


[facility] Hospitalist


Current Inpatient Medications:





Current Inpatient Medications








 Medications


  (Trade)  Dose


 Ordered  Sig/Diego


 Route  Start Time


 Stop Time Status Last Admin


Dose Admin


 


 Acetaminophen


  (Tylenol Tab)  650 mg  Q4H  PRN


 PO  1/28/18 20:15


 2/27/18 20:14   


 


 


 Ondansetron HCl


  (Zofran Inj)  4 mg  Q6H  PRN


 IV  1/28/18 20:15


 2/27/18 20:14   


 


 


 Amiodarone HCl


  (Cordarone Tab)  200 mg  DAILY


 PO  1/29/18 09:00


 2/28/18 08:59  1/29/18 09:00


200 MG


 


 Atorvastatin


 Calcium


  (Lipitor Tab)  40 mg  DAILY


 PO  1/29/18 09:00


 2/28/18 08:59  1/29/18 09:01


40 MG


 


 Salmeterol


 Xinafoate/


 Fluticasone


  (Advair Diskus


 100/50 Inh)  1 puff  BID


 INH  1/28/18 21:00


 2/27/18 20:59  1/29/18 08:59


1 PUFF


 


 Fluticasone


 Propionate


  (Flonase Nasal


 Spray)  2 sprays  DAILY  PRN


 BLADIMIR  1/28/18 20:15


 2/27/18 20:14   


 


 


 Lisinopril


  (Zestril Tab)  2.5 mg  DAILY


 PO  1/29/18 09:00


 2/28/18 08:59  1/29/18 09:02


2.5 MG


 


 Metoprolol


 Succinate


  (Toprol Xl Tab)  12.5 mg  DAILY


 PO  1/29/18 09:00


 2/28/18 08:59  1/29/18 09:03


12.5 MG


 


 Potassium Chloride


  (Klor-Con M10)  10 meq  BID


 PO  1/28/18 21:00


 2/27/18 20:59  1/29/18 09:00


10 MEQ


 


 Spironolactone


  (Aldactone Tab)  12.5 mg  DAILY


 PO  1/29/18 09:00


 2/28/18 08:59  1/29/18 08:59


12.5 MG


 


 Tramadol HCl


  (Ultram Tab)  50 mg  Q4H  PRN


 PO  1/28/18 20:15


 2/27/18 20:14   


 


 


 Albuterol


  (Ventolin Hfa


 Inhaler)  2 puffs  Q4H  PRN


 INH  1/28/18 20:15


 2/27/18 20:14   


 


 


 Pantoprazole


 Sodium


  (Protonix Tab)  40 mg  QAM  PRN


 PO  1/29/18 09:00


 2/28/18 08:59   


 


 


 Ranitidine HCl


  (zANTac TAB)  300 mg  HS  PRN


 PO  1/28/18 20:30


 2/27/18 20:29  1/29/18 00:58


300 MG


 


 Furosemide 60 mg/


 Syringe  6 ml @ 4


 mls/min  BID17


 IV  1/29/18 09:00


 2/28/18 08:59  1/29/18 08:59


4 MLS/MIN


 


 Miscellaneous


  (Iv Fluids


 Completed)  1 ea  PRN  PRN


 N/A  1/28/18 21:45


 1/28/19 21:44   


 


 


 Miscellaneous


  (Iv Fluids


 Completed)  1 ea  PRN  PRN


 N/A  1/28/18 21:45


 1/28/19 21:44

## 2018-01-29 NOTE — CLINICAL DOCUMENTATION QUERY
MERT Villatoro :



**** CLINICAL DOCUMENTATION QUERY****



Patient is an 82 year old female admitted for evaluation of "hemoptysis, most likely 
aggravated by supratherapeutic INR".  As appropriate, consider documentation as suggested 
below as this impacts accurate DRG assignment. 



In your clinical opinion is this patient being managed for:

    

                        (  ) Coagulation defect/hemorrhagic disorder due to extrinsic 
circulating anticoagulants

                        (  x) Not Agree-just a supratherapeutic INR present but she is not 
bleeding, no hemoptysis 



                        (  ) Other explanation of clinical findings (Please Explain)

                        (  ) Unable to determine (Please Define)

                        (  ) Need to Discuss

                        



The medical record reflects the following clinical findings, treatment, and risk factors.  
  



Clinical Indicators: Supratherapeutic INR in the setting of Coumadin use

Treatment: Vitamin K, serial coagulation studies

Risk Factors: Coumadin use



CC: Bleeding d/t Therapeutic Anticoagulation  



Coding [facility] [id number], p14



Question: Should bleeding due to therapeutic anticoagulant be coded as a hemorrhagic 
disorder (category D68)?



Answer: For the most part, "hemorrhagic disorder" or "coagulation defects" must be 
specifically diagnosed and documented by the provider, in order to assign codes at 
category D68, Other coagulation defects. However, for bleeding such as hemoptysis, 
hematuria, hematemesis, hematochezia, etc., that is associated with a drug, as part of 
anticoagulation therapy, assign code D68.32, Hemorrhagic disorder due to extrinsic 
circulating anticoagulants. This is supported by the inclusion term at D68.32 of 
"Drug-induced hemorrhagic disorder." The sequencing of code D68.32 and other codes 
describing the type or site of bleeding, (e.g., hemoptysis or hematuria), would be 
dependent on the circumstances of the admission.



Copyright (2018), [employer] ("[employer]"), [place], [place]. Reproduced 
with permission. No portion of this publication may be copied without the express, written 
consent of [employer]HA.



Your Clinical Example



A 75 yo male presents to the ED from the physician's office for direct admit. He is 
complaining of blood in his urine. He has a history of recurrent Afib and takes Coumadin 
on daily basis. Interview of the patient reveals that he has taken his medication as 
directed. 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 



*This patient's PDX is: D68.32, Hemorrhagic disorder due to extrinsic circulating 
anticoagulants with R31.9 Hematuria



Please clarify and document your clinical opinion in the progress notes and discharge 
summary. Terms such as "probable", "suspected", "likely", "questionable", "possible", or 
"still to be ruled out" are acceptable. 



*****IF IN AGREEMENT, YOU MUST DOCUMENT ABOVE DIAGNOSTIC STATEMENT IN DAILY PROGRESS NOTES 
AND DISCHARGE SUMMARY. This document is not part of the patient's record.*****

Thank You, Jose Panda, [phone]

## 2018-01-30 VITALS — OXYGEN SATURATION: 92 % | HEART RATE: 64 BPM

## 2018-01-30 VITALS
SYSTOLIC BLOOD PRESSURE: 136 MMHG | OXYGEN SATURATION: 92 % | DIASTOLIC BLOOD PRESSURE: 76 MMHG | HEART RATE: 72 BPM | TEMPERATURE: 97.52 F

## 2018-01-30 VITALS
OXYGEN SATURATION: 94 % | HEART RATE: 57 BPM | DIASTOLIC BLOOD PRESSURE: 64 MMHG | TEMPERATURE: 99.68 F | SYSTOLIC BLOOD PRESSURE: 118 MMHG

## 2018-01-30 VITALS
OXYGEN SATURATION: 94 % | TEMPERATURE: 97.52 F | SYSTOLIC BLOOD PRESSURE: 110 MMHG | HEART RATE: 54 BPM | DIASTOLIC BLOOD PRESSURE: 71 MMHG

## 2018-01-30 VITALS
HEART RATE: 57 BPM | TEMPERATURE: 97.88 F | OXYGEN SATURATION: 94 % | DIASTOLIC BLOOD PRESSURE: 64 MMHG | SYSTOLIC BLOOD PRESSURE: 118 MMHG

## 2018-01-30 VITALS
TEMPERATURE: 97.52 F | SYSTOLIC BLOOD PRESSURE: 121 MMHG | OXYGEN SATURATION: 91 % | HEART RATE: 60 BPM | DIASTOLIC BLOOD PRESSURE: 69 MMHG

## 2018-01-30 VITALS — TEMPERATURE: 97.88 F

## 2018-01-30 LAB
BUN SERPL-MCNC: 24 MG/DL (ref 7–18)
CALCIUM SERPL-MCNC: 8.3 MG/DL (ref 8.5–10.1)
CO2 SERPL-SCNC: 27 MMOL/L (ref 21–32)
CREAT SERPL-MCNC: 1.39 MG/DL (ref 0.6–1.2)
EOSINOPHIL NFR BLD AUTO: 258 K/UL (ref 130–400)
GLUCOSE SERPL-MCNC: 78 MG/DL (ref 70–99)
HCT VFR BLD CALC: 35.2 % (ref 37–47)
HGB BLD-MCNC: 11.2 G/DL (ref 12–16)
INR PPP: 2 (ref 0.9–1.1)
MCH RBC QN AUTO: 25.9 PG (ref 25–34)
MCHC RBC AUTO-ENTMCNC: 31.8 G/DL (ref 32–36)
MCV RBC AUTO: 81.5 FL (ref 80–100)
PMV BLD AUTO: 10.5 FL (ref 7.4–10.4)
POTASSIUM SERPL-SCNC: 3.7 MMOL/L (ref 3.5–5.1)
RED CELL DISTRIBUTION WIDTH CV: 18.4 % (ref 11.5–14.5)
RED CELL DISTRIBUTION WIDTH SD: 55.2 FL (ref 36.4–46.3)
SODIUM SERPL-SCNC: 137 MMOL/L (ref 136–145)
WBC # BLD AUTO: 4.19 K/UL (ref 4.8–10.8)

## 2018-01-30 RX ADMIN — FLUTICASONE PROPIONATE AND SALMETEROL SCH PUFF: 50; 100 POWDER RESPIRATORY (INHALATION) at 10:07

## 2018-01-30 RX ADMIN — AMIODARONE HYDROCHLORIDE SCH MG: 200 TABLET ORAL at 10:11

## 2018-01-30 RX ADMIN — POTASSIUM CHLORIDE SCH MEQ: 750 TABLET, EXTENDED RELEASE ORAL at 10:09

## 2018-01-30 RX ADMIN — SPIRONOLACTONE SCH MG: 25 TABLET, FILM COATED ORAL at 10:08

## 2018-01-30 RX ADMIN — LISINOPRIL SCH MG: 2.5 TABLET ORAL at 10:10

## 2018-01-30 RX ADMIN — FUROSEMIDE SCH MLS/MIN: 10 INJECTION, SOLUTION INTRAMUSCULAR; INTRAVENOUS at 10:08

## 2018-01-30 RX ADMIN — ATORVASTATIN CALCIUM SCH MG: 40 TABLET, FILM COATED ORAL at 10:11

## 2018-01-30 NOTE — PROGRESS NOTE
Medicine Progress Note


Date & Time of Visit:


Jan 30, 2018 at 14:00


.


Subjective





Feels well.


No chest pain or SOB.


No nausea, vomiting.


Has some bruising, but no abnormal bleeding.


Anxious to go home.


Ambulating.


.





Objective





Last 8 Hrs








  Date Time  Temp Pulse Resp B/P (MAP) Pulse Ox O2 Delivery O2 Flow Rate FiO2


 


1/30/18 14:05 36.6       


 


1/30/18 12:00 37.6 57 18 118/64 (82) 94 Room Air  


 


1/30/18 12:00      Room Air  


 


1/30/18 08:16      Room Air  


 


1/30/18 08:00      Room Air  


 


1/30/18 07:54 36.4 54 18 110/71 (84) 94 Room Air  








Physical Exam:





General- sitting in chair; no distress


Lungs- clear to auscultation; no respiratory distress


Cardiovascular- RRR; no gallop; + JVD; 2+ pretibial edema


Abdomen- + bowel sounds, distended, soft, nontender 


Extremities- no cyanosis; no calf tenderness 


Neuro- alert, oriented


Skin- warm & dry


.


Laboratory Results:





Last 24 Hours








Test


  1/30/18


06:16


 


White Blood Count 4.19 K/uL 


 


Red Blood Count 4.32 M/uL 


 


Hemoglobin 11.2 g/dL 


 


Hematocrit 35.2 % 


 


Mean Corpuscular Volume 81.5 fL 


 


Mean Corpuscular Hemoglobin 25.9 pg 


 


Mean Corpuscular Hemoglobin


Concent 31.8 g/dl 


 


 


RDW Standard Deviation 55.2 fL 


 


RDW Coefficient of Variation 18.4 % 


 


Platelet Count 258 K/uL 


 


Mean Platelet Volume 10.5 fL 


 


Prothrombin Time 20.7 SECONDS 


 


Prothromb Time International


Ratio 2.0 


 


 


Sodium Level 137 mmol/L 


 


Potassium Level 3.7 mmol/L 


 


Chloride Level 103 mmol/L 


 


Carbon Dioxide Level 27 mmol/L 


 


Anion Gap 7.0 mmol/L 


 


Blood Urea Nitrogen 24 mg/dl 


 


Creatinine 1.39 mg/dl 


 


Est Creatinine Clear Calc


Drug Dose 29.9 ml/min 


 


 


Estimated GFR (


American) 41.1 


 


 


Estimated GFR (Non-


American 35.5 


 


 


BUN/Creatinine Ratio 16.9 


 


Random Glucose 78 mg/dl 


 


Calcium Level 8.3 mg/dl 


 


Magnesium Level 2.2 mg/dl 











Assessment & Plan





CHF


Acute on chronic left ventricular diastolic heart failure.


May have component of right-sided failure as well due to sleep apnea.


Echo in clinic 1/18/18 showed mild concentric LVH, LVEF 55-59%, moderate aortic 

sclerosis without stenosis; no pulmonary hypertension noted, but study limited.


Received IV furosemide with improvement.


Discharge on furosemide 80 mg BID.


Increase spironolactone to 50 mg daily.


Discontinue KCl in light of increased spironolactone dose.


Check basic metabolic profile in clinic.


Patient confirms liberal consumption of fluids; start fluid restriction of 1500 

mls / day.


CHF instructions given.


Follow-up in CHF clinic.





ATRIAL FIBRILLATION


Sinus bradycardia in 50's at time of admission.


Continue amiodarone, metoprolol, warfarin.


Titration of warfarin as discussed below.





COAGULOPATHY


INR > 10 at time of admission.


Coagulopathy secondary to warfarin therapy.


Only recent med change was addition of spironolactone.


On amiodarone, but has been on it for some time.


Recent bronchitis, but PO intake has been good.


Apparent underlying cirrhosis may be a factor.


Bruising, but no active bleeding.


Warfarin held.


Received vitamin K (total of 12.5 mg).


INR day of discharge 2.0.


Was taking 5 mg of warfarin 6 days / week and 7.5 mg once a week.


Discharged on 2.5 mg daily.


Should have INR in clinic in a few days.





HYPERTENSION


Diuretics as discussed above.


Continue metoprolol; consider switching to nadolol in light of cirrhosis.


Continue lisinopril with caution while receiving spironolactone.


Follow and titrate therapy.





SLEEP APNEA


Continue CPAP.





CIRRHOSIS


Recently diagnosed by CT.


Etiology uncertain, but may have NICHOLSON.


Increasing fluid retention may be partly due to cirrhosis.


Diuretic management as discussed above.


Consider switching beta blocker to nadolol- will defer to Cardiology.


Further outpatient evaluation / management per PCP.





CKD III


Creatinine 1.61 --> 1.39.


Follow.





? LOW GRADE FEVER


Temp at 12:00 37.6.


Repeat temp 36.6 at 14:05 and 15:24.


No signs / symptoms of infection.





VTE PROPHYLAXIS


On warfarin with supratherapeutic INR.


Additional prophylaxis contraindicated in light of coagulopathy.


Ambulating.





DISPOSITION


Internal Medicine follow-up with Dr. Roche.


Cardiology follow-up with Dr. Alvarez and GIOVANI Pizano.


Nephrology follow-up with Dr. Acosta.


.


Current Inpatient Medications:





Current Inpatient Medications








 Medications


  (Trade)  Dose


 Ordered  Sig/Diego


 Route  Start Time


 Stop Time Status Last Admin


Dose Admin


 


 Acetaminophen


  (Tylenol Tab)  650 mg  Q4H  PRN


 PO  1/28/18 20:15


 2/27/18 20:14   


 


 


 Ondansetron HCl


  (Zofran Inj)  4 mg  Q6H  PRN


 IV  1/28/18 20:15


 2/27/18 20:14   


 


 


 Amiodarone HCl


  (Cordarone Tab)  200 mg  DAILY


 PO  1/29/18 09:00


 2/28/18 08:59  1/30/18 10:11


200 MG


 


 Atorvastatin


 Calcium


  (Lipitor Tab)  40 mg  DAILY


 PO  1/29/18 09:00


 2/28/18 08:59  1/30/18 10:11


40 MG


 


 Salmeterol


 Xinafoate/


 Fluticasone


  (Advair Diskus


 100/50 Inh)  1 puff  BID


 INH  1/28/18 21:00


 2/27/18 20:59  1/30/18 10:07


1 PUFF


 


 Fluticasone


 Propionate


  (Flonase Nasal


 Spray)  2 sprays  DAILY  PRN


 BLADIMIR  1/28/18 20:15


 2/27/18 20:14   


 


 


 Lisinopril


  (Zestril Tab)  2.5 mg  DAILY


 PO  1/29/18 09:00


 2/28/18 08:59  1/30/18 10:10


2.5 MG


 


 Metoprolol


 Succinate


  (Toprol Xl Tab)  12.5 mg  DAILY


 PO  1/29/18 09:00


 2/28/18 08:59  1/29/18 09:03


12.5 MG


 


 Potassium Chloride


  (Klor-Con M10)  10 meq  BID


 PO  1/28/18 21:00


 2/27/18 20:59  1/30/18 10:09


10 MEQ


 


 Spironolactone


  (Aldactone Tab)  12.5 mg  DAILY


 PO  1/29/18 09:00


 2/28/18 08:59  1/29/18 08:59


12.5 MG


 


 Tramadol HCl


  (Ultram Tab)  50 mg  Q4H  PRN


 PO  1/28/18 20:15


 2/27/18 20:14   


 


 


 Albuterol


  (Ventolin Hfa


 Inhaler)  2 puffs  Q4H  PRN


 INH  1/28/18 20:15


 2/27/18 20:14   


 


 


 Pantoprazole


 Sodium


  (Protonix Tab)  40 mg  QAM  PRN


 PO  1/29/18 09:00


 2/28/18 08:59  1/29/18 16:02


40 MG


 


 Ranitidine HCl


  (zANTac TAB)  300 mg  HS  PRN


 PO  1/28/18 20:30


 2/27/18 20:29  1/29/18 00:58


300 MG


 


 Furosemide 60 mg/


 Syringe  6 ml @ 4


 mls/min  BID17


 IV  1/29/18 09:00


 2/28/18 08:59  1/30/18 10:08


4 MLS/MIN


 


 Miscellaneous


  (Iv Fluids


 Completed)  1 ea  PRN  PRN


 N/A  1/28/18 21:45


 1/28/19 21:44   


 


 


 Miscellaneous


  (Iv Fluids


 Completed)  1 ea  PRN  PRN


 N/A  1/28/18 21:45


 1/28/19 21:44

## 2018-01-30 NOTE — DISCHARGE SUMMARY
Discharge Summary


Date of Service


Jan 30, 2018.





Discharge Summary


Admission Date:


Jan 28, 2018 at 20:09


Discharge Date:  Jan 30, 2018


Discharge Disposition:  Home


Principal Diagnosis:


acute on chronic left ventricular diastolic heart failure





OTHER ACUTE DIAGNOSES:


coagulopathy secondary to warfarin


.


Secondary Diagnoses/Problems:





Chronic and Resolved Medical Problems:





(1) Anticoagulated on warfarin


Status: Chronic  





(2) Asthma


Status: Chronic  





(3) Benign hypertension


Status: Chronic  





(4) CHF (congestive heart failure)


Permanent Comment: normal LVEF per echo Jan 2018


Status: Chronic  





(5) Chronic kidney disease (CKD), stage III (moderate)


Status: Chronic  





(6) Cirrhosis of liver


Permanent Comment: noted on CT Jan 2018; etiology to be determined


Status: Chronic  





(7) GERD (gastroesophageal reflux disease)


Status: Chronic  





(8) Hiatal hernia


Status: Chronic  





(9) CINDY on CPAP


Status: Chronic  





(10) Paroxysmal atrial fibrillation


Status: Chronic  





(11) s/p elective cardioversion


Permanent Comment: Sept 2013 at [facility] for atrial fibrillation


Status: Chronic  





(12) Seasonal allergies


Status: Chronic  








Surgical Problems:





(1) H/O: hysterectomy


Status: Chronic  





(2) S/P cataract surgery


Status: Chronic  


.





Procedures:


IV meds


cardiac monitoring


venous duplex lower extremity


.


Pending Studies/Follow-Up:





Please check basic metabolic profile and INR in clinic.


.





Medication Reconciliation


New Medications:  


Spironolactone (Aldactone) 50 Mg Tab


50 MG PO DAILY, #90 TAB 3 Refills





Warfarin Sod (Coumadin) 5 Mg Tab


0 PO DAILY, #90 TAB 3 Refills


Dose will change from time to time.


 Dose as of 1/29/18 = 1/2 pill (2.5 mg) daily.


 


Continued Medications:  


Acetaminophen (Acetaminophen) 500 Mg Tab


1000 MG PO Q8 PRN for Pain or Fever, TAB





Albuterol Sulfate (Proair Respiclick) 108 Mcg/Act Aer


2 PUFFS INH Q4H PRN for SOB/Wheezing





Amiodarone HCl (Amiodarone HCl) 200 Mg Tab


200 MG PO DAILY





Atorvastatin (Lipitor) 40 Mg Tab


40 MG PO DAILY





Fluticasone Prop/Salmeterol (Advair Diskus 100/50 60 Dose) 1 Ea Aerp


1 PUFF INH BID





Fluticasone Propionate (Fluticasone Propionate) 120 Sprays/6000 Mcg Inha


2 SPRAYS BLADIMIR DAILY PRN for Allergy Symptoms





Furosemide (Furosemide) 80 Mg Tab


80 MG PO BID





Lisinopril (Lisinopril) 2.5 Mg Tab


2.5 MG PO DAILY, TAB





Metoprolol Succinate (Metoprolol Succinate ER) 25 Mg Tabcr


12.5 MG PO DAILY





Omeprazole (Prilosec) 20 Mg Cap


20 MG PO DAILY PRN for Acid Reflux, CAP





Ranitidine (Zantac) 300 Mg Tab


300 MG PO HS PRN for Heart Burn/Acid Reflux, TAB





Tramadol HCl (Tramadol HCl) 50 Mg Tab


50 MG PO Q4H PRN for Pain





 


Discontinued Medications:  


Potassium Chloride Microencaps (Potassium Chloride Er) 10 Meq Tab


10 MEQ PO DAILY, TAB





Spironolactone (Aldactone) 25 Mg Tab


12.5 MG PO DAILY





Warfarin Sodium (Warfarin Sodium) 5 Mg Tab


5 MG PO 6XWK, TAB


TAKE 5 MG AT BEDTIME EVERY SUNDAY,MONDAY,TUESDAY,WEDNESDAY,FRIDAY AND


 SATURDAY OR AS OTHERWISE DIRECTED TO TAKE BY ANTICOAGULATION


 CLINIC/MD.


Warfarin Sodium (Warfarin Sodium) 5 Mg Tab


7.5 MG PO WK, TAB


TAKE 7.5 MG AT BEDTIME EVERY THURSDAY OR AS OTHERWISE DIRECTED TO


 TAKE BY ANTICOAGULATION CLINIC/MD.








Admission Information


HPI (per Admitting provider):





She is an 82-year-old female with significant


past medical history of chronic diastolic heart failure, atrial fibrillation,


on anticoagulation, hypertensive heart disease, obstructive sleep apnea, on


CPAP, chronic leg edema, mild asthma, chronic kidney disease, hyperlipidemia,


apparently has been complaining of flu-like symptoms for a while.  She was


diagnosed with flu sometime during Michael.  She feels that the symptoms


have not gone away completely.  She was evaluated by cardiologist in [facility] on 01/23/2018.  At that time, she was given additional dose of Lasix


for her edema and she had a CAT scan of the chest and also underwent


echocardiogram to evaluate her cardiac function.  She complains to have


ongoing cough symptoms but no fever, chills or rigors.  No chest pain or


palpitation with it and this morning she noted to have blood in the sputum


and that is the reason she was brought into the Emergency Room.  She also


complains to have some swelling of the legs, and whenever she has worsening


of the legs, she gets more shortness of breath.  No problem with urine and/or


bowel habit.  No abdominal pain, nausea and/or vomiting.  No other bleeding


areas.


.


Physical Exam (per Admitting):





GENERAL:  On examination in the Emergency Room, she was minimally short of


breath at rest but no other symptoms.


VITAL SIGNS:  Temperature 36.4, pulse 64, blood pressure 144/69, saturation


91% on room air.


HEENT:  Unremarkable.


NECK:  Supple.  No JVD, no bruit.


CHEST:  Decreased breath sounds at bases with occasional wheezing.


HEART:  S1, S2, irregular with 2/6 systolic murmur over precordium.


ABDOMEN:  Distended, soft, nontender.  Bowel sounds present.


RECTAL:  Deferred.


EXTREMITIES:  1+ edema bilaterally with some skin changes but no evidence of


any cellulitis.


MUSCULOSKELETAL:  Did not show any acute arthritis.


CENTRAL NERVOUS SYSTEM:  She was alert, awake, oriented x3.  She was


generally weak but no focal sensory and/or motor deficit appreciated.


.





Hospital Course





CHF


Presented to ED with SOB and edema.


Chest x-ray demonstrated cardiomegaly, CHF, small pleural effusions.


Acute on chronic left ventricular diastolic heart failure.


May have component of right-sided failure as well due to sleep apnea.


Echo in clinic 1/18/18 showed mild concentric LVH, LVEF 55-59%, moderate aortic 

sclerosis without stenosis; no pulmonary hypertension noted, but study limited.


Received IV furosemide with improvement.


Discharge on furosemide 80 mg BID.


Increase spironolactone to 50 mg daily.


Discontinue KCl in light of increased spironolactone dose.


Check basic metabolic profile in clinic.


Patient confirms liberal consumption of fluids; start fluid restriction of 1500 

mls / day.


CHF instructions given.


Follow-up in CHF clinic.





ATRIAL FIBRILLATION


Sinus bradycardia in 50's at time of admission.


Continue amiodarone, metoprolol, warfarin.


Titration of warfarin as discussed below.





COAGULOPATHY


INR > 10 at time of admission.


Coagulopathy secondary to warfarin therapy.


Only recent med change was addition of spironolactone.


On amiodarone, but has been on it for some time.


Recent bronchitis, but PO intake has been good.


Apparent underlying cirrhosis may be a factor.


Bruising, but no active bleeding.


Warfarin held.


Received vitamin K (total of 12.5 mg).


INR day of discharge 2.0.


Was taking 5 mg of warfarin 6 days / week and 7.5 mg once a week.


Discharged on 2.5 mg daily.


Should have INR in clinic in a few days.





HYPERTENSION


Diuretics as discussed above.


Continue metoprolol; consider switching to nadolol in light of cirrhosis.


Continue lisinopril with caution while receiving spironolactone.


Follow and titrate therapy.





SLEEP APNEA


Continue CPAP.





CIRRHOSIS


Recently diagnosed by CT.


Etiology uncertain, but may have NICHOLSON.


Increasing fluid retention may be partly due to cirrhosis.


Diuretic management as discussed above.


Consider switching beta blocker to nadolol- will defer to Cardiology.


Further outpatient evaluation / management per PCP.





CKD III


Creatinine 1.61 --> 1.39.


Follow.





VTE PROPHYLAXIS


On warfarin with supratherapeutic INR.


Additional prophylaxis contraindicated in light of coagulopathy.


Ambulating.





DISPOSITION


Internal Medicine follow-up with Dr. Roche.


Cardiology follow-up with Dr. Alvarez and GIOVANI Pizano.


Nephrology follow-up with Dr. Acosta.


.


Total time spent on discharge = 40 min.


This includes examination of the patient, discharge planning, medication 

reconciliation, and communication with other providers.


.





Discharge Instructions





Date of Service


Jan 30, 2018.





Admission


Reason for Admission:  fluid retention from congestive heart failure


.





Discharge


Discharge Diagnosis / Problem:   fluid retention from congestive heart failure





Discharge Goals


Goal(s):  Decrease discomfort, Improve function, Improve disease control





Activity Recommendations


Activity Limitations:  resume your previous activity





.





Instructions / Follow-Up


Instructions / Follow-Up





APPOINTMENTS:





INTERNAL MEDICINE


2/2/2018 1:00 PM 


Soham Roche MD





NEPHROLOGY


2/20/2018 11:20 AM 


Blanka Acosta MD





CARDIOLOGY


4/5/2018 3:30 PM 


Johnathan Alvarez, DO








OTHER INSTRUCTIONS:





You were retaining fluid in your lungs, abdomen, and legs.


Fluid retention probably due to combination of congestive heart failure and 

perhaps liver disease.


Continue furosemide (Lasix) 80 mg twice a day.


Increase spironolactone (Aldactone) to 50 mg daily.


Spironolactone can cause high potassium levels, so stop taking potassium pills.





Your blood was too thin from warfarin (Coumadin).


New dose starting 1/29/18 is 1/2 of a 5 mg pill daily.


[facility] will give you new instructions after your next 

blood tests.





Limit your fluids to about 1500 mls / day (about 6 cups).





Seek medical attention if you have:


*  temperature above 101


*  chest pain or trouble breathing


*  abdominal pain, nausea, vomiting


*  diarrhea, dark stools or bloody stools


*  any unanswered questions or concerns





Call 911 if symptoms are severe.





Call if you have any questions or problems.


My cell # is [phone].





You can also reach a [facility] hospitalist on duty at [facility] 24 hours a day by calling [phone].





Please take good care of yourself.





Deepak Wall

















Call your Primary Care doctor if any of the following symptoms or problems 

start or get worse:





* Shortness of breath or difficulty breathing


* Wake up at night short of breath


* Chest pain


* Cough


* Swelling of your hands, feet, or legs


* More fatigued or tired with your normal activity


* Palpitations - sudden fast heart beats





WEIGHT





* Weigh yourself every morning after using the bathroom.


* Use the same scale.


* Wear the same amount of clothing.


* Write your weight down on a chart.


* Call your Primary Care doctor if you gain more than 2-3 pounds in 1-2 days.





MEDICATIONS





* Use this discharge instruction sheet for medication instructions.


* Take your medications at the time your doctor ordered.


* Do not skip a dose of your medicines.


* If you miss a dose of medicine, take it as soon as possible, but DO NOT 

DOUBLE A DOSE.


* Read your medicine information when you get home.


* Know all of the side effects of your medicine.  If in doubt, ask your 

pharmacist


* Call your Primary Care doctor's office if you have any side effects.


* Be sure all of your doctors know what medicine and herbs you take (including 

cold, flu, and herbal medicine).








Take the following with you to your follow-up doctor appointments:





* Weight Chart


* Medication List


* List of questions





Do not drink excessive alcohol, beer or wine.





Current Hospital Diet


Patient's current hospital diet: Low Sodium Diet (2gm Na)





Discharge Diet


Recommended Diet:  AHA Diet (Heart Healthy)


Fluid Restriction:  1500 ml (6 cups)





Procedures


Procedures Performed:  


chest x-ray- fluid in lungs


ultrasound legs- no blood clots





Pending Studies


Studies pending at discharge:  no





Medical Emergencies








.


Who to Call and When:





Call 911 or go to the Emergency Room if:





* If at any time you feel your situation is an emergency


* You have tightness or pain in your chest that does not go away with rest or 

Nitroglycerin


* You are very short of breath even with rest





.





Non-Emergent Contact


Non-Emergency issues call your:  Primary Care Provider, Cardiologist, Hospital 

Doctor





.


.








"Provider Documentation" section prepared by Deepak Wall.








.





VTE Core Measure


Inpt VTE Proph given/why not?:  Warfarin (Coumadin)


.





Additional Copies To


Blanka Acosta MD; Soham Roche MD; Johnathan Alvarez DO; Yana Pizano, PA-C

## 2018-01-30 NOTE — DISCHARGE INSTRUCTIONS
Discharge Instructions


Date of Service


Jan 30, 2018.





Admission


Reason for Admission:  fluid retention from congestive heart failure


.





Discharge


Discharge Diagnosis / Problem:   fluid retention from congestive heart failure





Discharge Goals


Goal(s):  Decrease discomfort, Improve function, Improve disease control





Activity Recommendations


Activity Limitations:  resume your previous activity





.





Instructions / Follow-Up


Instructions / Follow-Up





APPOINTMENTS:





INTERNAL MEDICINE


2/2/2018 1:00 PM 


Soham Roche MD





NEPHROLOGY


2/20/2018 11:20 AM 


Blanka Acosta MD





CARDIOLOGY


4/5/2018 3:30 PM 


Johnathan Alvarez, 








OTHER INSTRUCTIONS:





You were retaining fluid in your lungs, abdomen, and legs.


Fluid retention probably due to combination of congestive heart failure and 

perhaps liver disease.


Continue furosemide (Lasix) 80 mg twice a day.


Increase spironolactone (Aldactone) to 50 mg daily.


Spironolactone can cause high potassium levels, so stop taking potassium pills.





Your blood was too thin from warfarin (Coumadin).


New dose starting 1/29/18 is 1/2 of a 5 mg pill daily.


[facility] will give you new instructions after your next 

blood tests.





Limit your fluids to about 1500 mls / day (about 6 cups).





Seek medical attention if you have:


*  temperature above 101


*  chest pain or trouble breathing


*  abdominal pain, nausea, vomiting


*  diarrhea, dark stools or bloody stools


*  any unanswered questions or concerns





Call [phone] if symptoms are severe.





Call if you have any questions or problems.


My cell # is [phone].





You can also reach a [facility] hospitalist on duty at [facility] 24 hours a day by calling [phone].





Please take good care of yourself.





Deepak Wall

















Call your Primary Care doctor if any of the following symptoms or problems 

start or get worse:





* Shortness of breath or difficulty breathing


* Wake up at night short of breath


* Chest pain


* Cough


* Swelling of your hands, feet, or legs


* More fatigued or tired with your normal activity


* Palpitations - sudden fast heart beats





WEIGHT





* Weigh yourself every morning after using the bathroom.


* Use the same scale.


* Wear the same amount of clothing.


* Write your weight down on a chart.


* Call your Primary Care doctor if you gain more than 2-3 pounds in 1-2 days.





MEDICATIONS





* Use this discharge instruction sheet for medication instructions.


* Take your medications at the time your doctor ordered.


* Do not skip a dose of your medicines.


* If you miss a dose of medicine, take it as soon as possible, but DO NOT 

DOUBLE A DOSE.


* Read your medicine information when you get home.


* Know all of the side effects of your medicine.  If in doubt, ask your 

pharmacist


* Call your Primary Care doctor's office if you have any side effects.


* Be sure all of your doctors know what medicine and herbs you take (including 

cold, flu, and herbal medicine).








Take the following with you to your follow-up doctor appointments:





* Weight Chart


* Medication List


* List of questions





Do not drink excessive alcohol, beer or wine.





Current Hospital Diet


Patient's current hospital diet: Low Sodium Diet (2gm Na)





Discharge Diet


Recommended Diet:  AHA Diet (Heart Healthy)


Fluid Restriction:  1500 ml (6 cups)





Procedures


Procedures Performed:  


chest x-ray- fluid in lungs


ultrasound legs- no blood clots





Pending Studies


Studies pending at discharge:  no





Medical Emergencies








.


Who to Call and When:





Call 911 or go to the Emergency Room if:





* If at any time you feel your situation is an emergency


* You have tightness or pain in your chest that does not go away with rest or 

Nitroglycerin


* You are very short of breath even with rest





.





Non-Emergent Contact


Non-Emergency issues call your:  Primary Care Provider, Cardiologist, Hospital 

Doctor





.


.








"Provider Documentation" section prepared by Deepak Wall.








.





VTE Core Measure


Inpt VTE Proph given/why not?:  Warfarin (Coumadin)

## 2018-03-26 ENCOUNTER — HOSPITAL ENCOUNTER (OUTPATIENT)
Dept: HOSPITAL 45 - C.GI | Age: 83
Discharge: HOME | End: 2018-03-26
Attending: INTERNAL MEDICINE
Payer: COMMERCIAL

## 2018-03-26 VITALS — HEART RATE: 62 BPM | SYSTOLIC BLOOD PRESSURE: 116 MMHG | OXYGEN SATURATION: 96 % | DIASTOLIC BLOOD PRESSURE: 74 MMHG

## 2018-03-26 VITALS
HEIGHT: 59.02 IN | HEIGHT: 59.02 IN | WEIGHT: 181.37 LBS | BODY MASS INDEX: 36.56 KG/M2 | BODY MASS INDEX: 36.56 KG/M2 | WEIGHT: 181.37 LBS

## 2018-03-26 DIAGNOSIS — K74.60: Primary | ICD-10-CM

## 2018-03-26 DIAGNOSIS — E78.00: ICD-10-CM

## 2018-03-26 DIAGNOSIS — Z79.01: ICD-10-CM

## 2018-03-26 DIAGNOSIS — J45.909: ICD-10-CM

## 2018-03-26 DIAGNOSIS — Z88.1: ICD-10-CM

## 2018-03-26 DIAGNOSIS — J44.9: ICD-10-CM

## 2018-03-26 DIAGNOSIS — Z88.5: ICD-10-CM

## 2018-03-26 DIAGNOSIS — Z88.2: ICD-10-CM

## 2018-03-26 DIAGNOSIS — I50.9: ICD-10-CM

## 2018-03-26 DIAGNOSIS — Z88.0: ICD-10-CM

## 2018-03-26 DIAGNOSIS — Z79.899: ICD-10-CM

## 2018-03-26 DIAGNOSIS — I48.91: ICD-10-CM

## 2018-03-26 DIAGNOSIS — G47.30: ICD-10-CM

## 2018-03-26 DIAGNOSIS — I11.0: ICD-10-CM

## 2018-03-26 DIAGNOSIS — K31.89: ICD-10-CM

## 2018-03-26 DIAGNOSIS — K21.9: ICD-10-CM

## 2018-03-26 DIAGNOSIS — K44.9: ICD-10-CM

## 2018-03-26 NOTE — GI REPORT
Procedure Date: 3/26/2018 10:28 AM

Procedure:            Upper GI endoscopy

Indications:          Cirrhosis rule out esophageal varices

Medicines:            Monitored Anesthesia Care

Complications:        No immediate complications.

Estimated Blood Loss: Estimated blood loss: none.

Procedure:            Pre-Anesthesia Assessment:

                      - Prior to the procedure, a History and Physical was 

                      performed, and patient medications and allergies were 

                      reviewed. The patient is competent. The risks and 

                      benefits of the procedure and the sedation options and 

                      risks were discussed with the patient. All questions 

                      were answered and informed consent was obtained. 

                      Patient identification and proposed procedure were 

                      verified by the physician and the nurse in the 

                      procedure room. Mental Status Examination: alert and 

                      oriented. Airway Examination: normal oropharyngeal 

                      airway and neck mobility. Respiratory Examination: 

                      clear to auscultation. CV Examination: normal. ASA 

                      Grade Assessment: III - A patient with severe systemic 

                      disease. After reviewing the risks and benefits, the 

                      patient was deemed in satisfactory condition to undergo 

                      the procedure. The anesthesia plan was to use monitored 

                      anesthesia care (MAC). Immediately prior to 

                      administration of medications, the patient was 

                      re-assessed for adequacy to receive sedatives. The 

                      heart rate, respiratory rate, oxygen saturations, blood 

                      pressure, adequacy of pulmonary ventilation, and 

                      response to care were monitored throughout the 

                      procedure. The physical status of the patient was 

                      re-assessed after the procedure.

                      After obtaining informed consent, the endoscope was 

                      passed under direct vision. Throughout the procedure, 

                      the patient's blood pressure, pulse, and oxygen 

                      saturations were monitored continuously. The scope was 

                      introduced through the mouth, and advanced to the 

                      second part of duodenum. The upper GI endoscopy was 

                      accomplished without difficulty. The patient tolerated 

                      the procedure well.

Findings:

     The examined esophagus was normal. No varices seen.

     The Z-line was regular and was found 33 cm from the incisors.

     A medium-sized hiatal hernia was present.

     Moderate portal hypertensive gastropathy was found in the gastric fundus 

     and in the gastric body.

     The duodenal bulb and second portion of the duodenum were normal.

Impression:           - Normal esophagus. No varices.

                      - Medium-sized hiatal hernia.

                      - Portal hypertensive gastropathy.

                      - Normal duodenal bulb and second portion of the 

                      duodenum.

                      - No specimens collected.

Recommendation:       - Discharge patient to home.

                      - Iron supplementation.

                      - Repeat upper endoscopy in 2 years for surveillance.

                      - Return to referring physician.

Cb Baker MD

3/26/2018 10:55:18 AM

This report has been signed electronically.

Note Initiated On: 3/26/2018 10:28 AM

     I attest to the content of the Intraoperative Record and orders 

     documented therein, exceptions below

## 2018-03-26 NOTE — ANESTHESIOLOGY PROGRESS NOTE
Anesthesia Post Op Note


Date & Time


Mar 26, 2018 at 11:33





Vital Signs


Pain Intensity:  0





Vital Signs Past 12 Hours








  Date Time  Temp Pulse Resp B/P (MAP) Pulse Ox O2 Delivery O2 Flow Rate FiO2


 


3/26/18 11:23  62 16 116/74 (88) 96 Room Air  


 


3/26/18 11:08  63 16 99/68 (78) 98 Room Air  


 


3/26/18 10:53  69 16 90/65 (73) 98 Room Air  


 


3/26/18 09:42 36.4 66 16 140/91 (107) 91 Room Air  











Notes


Mental Status:  alert / awake / arousable, participated in evaluation


Pt Amnestic to Procedure:  Yes


Nausea / Vomiting:  adequately controlled


Pain:  adequately controlled


Airway Patency, RR, SpO2:  stable & adequate


BP & HR:  stable & adequate


Hydration State:  stable & adequate


Anesthetic Complications:  no major complications apparent

## 2018-03-26 NOTE — ENDO HISTORY AND PHYSICAL
History & Physical


Date of Service:


Mar 26, 2018.


Chief Complaint:





Referring Physician:





History of Present Illness


Cirrhosis, evaluate for varices





Past Medical History


Atrial Fibrillation, Asthma, Reflux, High Cholesterol, Sleep Apnea, CHF, 

Hypertension, COPD





Past Surgical History


Hx Cardiac Surgery:  Yes (CARDIOVERSIONS)


Hx Internal Defibrillator:  No


Hx Pacemaker:  No


Hx Abdominal Surgery:  Yes (FULL HYSTER)


Hx of Implantable Prosthesis:  No


Hx Post-Op Nausea and Vomiting:  No


Hx Cancer Surgery:  No


Hx Thoracic Surgery:  No


Hx Orthopedic:  No


Hx Urinary Tract Surgery:  No





Family History


None





Social History


Smoking Status:  Never Smoker


Hx Substance Use:  No


Hx Alcohol Use:  No





Allergies


Coded Allergies:  


     Alcohol (Verified  Allergy, Mild, RASH, 3/26/18)


 WIPES OR INGESTED


     Cephalosporins (Verified  Allergy, Mild, RASH, 3/26/18)


     Codeine (Verified  Allergy, Mild, RASH, 3/26/18)


     Penicillins (Verified  Allergy, Mild, RASH, 3/26/18)


     Aspirin (Verified  Allergy, Unknown, RASH, 3/26/18)


     Levofloxacin (Verified  Allergy, Unknown, "BONE PAIN", 3/26/18)


     Sulfa Antibiotics (Verified  Allergy, Unknown, UNKNOWN, 3/26/18)





Current Medications





Reported Home Medications








 Medications  Dose


 Route/Sig


 Max Daily Dose Days Date Category


 


 Vitamin D 62888


 Unit


  (Ergocalciferol)


 50,000 Unit Cap  50,000 Unit


 PO WK


    3/20/18 Reported


 


 Jantoven


  (Warfarin Sodium)


 5 Mg Tab  0.5 Tab


 PO QPM


    3/20/18 Reported


 


 Aldactone


  (Spironolactone)


 50 Mg Tab  50 Mg


 PO QAM


    3/20/18 Reported


 


 Fluticasone


 Propionate 120


 Sprays/6000 Mcg


 Inha  2 Sprays


 BLADIMIR DAILY PRN


    1/28/18 Reported


 


 Tramadol HCl 50


 Mg Tab  50 Mg


 PO Q4H PRN


    1/28/18 Reported


 


 Lipitor


  (Atorvastatin


 Calcium) 40 Mg Tab  40 Mg


 PO QAM


    1/2/18 Reported


 


 Acetaminophen 500


 Mg Tab  1,000 Mg


 PO Q8 PRN


    1/2/18 Reported


 


 Proair Respiclick


  (Albuterol


 Sulfate) 108


 Mcg/Act Aer  2 Puffs


 INH Q4H PRN


    8/13/17 Reported


 


 Furosemide 80 Mg


 Tab  80 Mg


 PO BID


    8/13/17 Reported


 


 Advair Diskus


 100/50 60 Dose


  (Fluticasone


 Prop/Salmeterol)


 1 Ea Aerp  1 Puff


 INH BID


    8/12/17 Reported


 


 Metoprolol


 Succinate ER


  (Metoprolol


 Succinate) 25 Mg


 Tabcr  12.5 Mg


 PO QAM


    8/12/17 Reported


 


 Amiodarone HCl


 200 Mg Tab  200 Mg


 PO QAM


    6/21/16 Reported


 


 Prilosec


  (Omeprazole) 20


 Mg Cap  20 Mg


 PO DAILY PRN


    11/9/15 Reported


 


 Zantac


  (Ranitidine HCl)


 300 Mg Tab  300 Mg


 PO HS PRN


    6/11/15 Reported











Vital Signs


Weight (Kilograms):  82.27


Height (Feet):  4


Height (Inches):  11





Physical Exam


General Appearance:  no apparent distress


Respiratory/Chest:  


   Auscultation:  breath sounds normal


Cardiovascular:  


   Heart Auscultation:  RRR


Abdomen:  


   Inspection & Palpation:  soft


   Liver:  non-tender





Assessment and Plan


Stable for EGD

## 2018-03-26 NOTE — DISCHARGE INSTRUCTIONS
Endoscopy Patient Instructions


Date / Procedure(s) Performed


Mar 26, 2018.


EGD





Allergy Information


Coded Allergies:  


     Alcohol (Verified  Allergy, Mild, RASH, 3/26/18)


 WIPES OR INGESTED


     Cephalosporins (Verified  Allergy, Mild, RASH, 3/26/18)


     Codeine (Verified  Allergy, Mild, RASH, 3/26/18)


     Penicillins (Verified  Allergy, Mild, RASH, 3/26/18)


     Aspirin (Verified  Allergy, Unknown, RASH, 3/26/18)


     Levofloxacin (Verified  Allergy, Unknown, "BONE PAIN", 3/26/18)


     Sulfa Antibiotics (Verified  Allergy, Unknown, UNKNOWN, 3/26/18)





Discharge Date / Findings


Mar 26, 2018.


No varices.


Portal HTN gastropathy.





Medication Instructions


Stopped Medication(s):  


WARAFIN





Provider Instructions





Activity Restrictions





-  No exercising or heavy lifting for 24 hours. 


-  Do not drink alcohol the day of the procedure.


-  Do not drive a car or operate machinery until the day after the procedure.


-  Do not make any important decisions or sign important papers in 24 hours 

after the procedure.





Following Day:





-  Return to full activity which may include returning to work/school.





Diet





Start your diet with liquids and light foods (jello, soup, juice, toast).  Then 

eat your usual diet if not nauseated.





Treatment For Common After Affects





For mild abdominal pain, bloating, or excessive gas:





-  Rest


-  Eat lightly


-  Lie on right side





Follow-Up Information


Follow-up with Dr. Roche as scheduled





Anesthesia Information





What You Should Know





You have had a procedure that required some medicine to reduce anxiety and 

discomfort. This treatment is called moderate sedation.  


After receiving the treatment, you may be sleepy, but you will be able to 

breathe on your own.  The effects of the treatment may last for several hours.








Follow these instructions along with Activity/Diet recommendations noted above:





*  Do NOT do anything where dizziness or clumsiness would be dangerous.





*  Rest quietly at home today, then you can be up and about tomorrow.





*  Have a responsible person stay with you the rest of today.





*  You may have had an I.V. today.  If so, you may take the dressing off later 

today.





Recommendations


 


Call your doctor if:





*  Trouble breathing 





*  Continuous vomiting for more than 24 hours








*  Temperature above 101 degrees





*  Severe abdominal pain or bloating





*  Pain not relieved by pain medicine ordered





*  There is increased drainage or redness from any incision





*  A large amount of rectal bleeding greater than 2-3 tablespoons. 


   (If you had a polyp/s removed or have hemorrhoids, a small amount of blood -


    from the rectum is to be expected.)





*  You have any unanswered questions or concerns.








IN THE EVENT OF A SERIOUS EMERGENCY, GO TO THE NEAREST EMERGENCY ROOM








       Your discharge instructions were prepared by provider Cb Baker.





 Patient Instructions Signature Page














Danisha Hewitt 











Patient (or Guardian) Signature/Date:____________________________________ I 

have read and understand the instructions given to me by my caregivers.








Caregiver/RN/Doctor Signature/Date:____________________________________











The above-named patient and/or guardian has received patient instructions on 

this date.





























+  Original Patient Signature Page (only) stays with chart.  Please make copy 

for patient.

## 2018-05-11 ENCOUNTER — HOSPITAL ENCOUNTER (INPATIENT)
Dept: HOSPITAL 45 - C.EDB | Age: 83
LOS: 6 days | Discharge: HOME HEALTH | DRG: 441 | End: 2018-05-17
Attending: HOSPITALIST | Admitting: HOSPITALIST
Payer: COMMERCIAL

## 2018-05-11 VITALS
SYSTOLIC BLOOD PRESSURE: 136 MMHG | OXYGEN SATURATION: 92 % | TEMPERATURE: 97.88 F | DIASTOLIC BLOOD PRESSURE: 87 MMHG | HEART RATE: 137 BPM

## 2018-05-11 VITALS
BODY MASS INDEX: 40.09 KG/M2 | HEIGHT: 59 IN | BODY MASS INDEX: 40.09 KG/M2 | WEIGHT: 198.86 LBS | WEIGHT: 198.86 LBS | HEIGHT: 59 IN

## 2018-05-11 VITALS — SYSTOLIC BLOOD PRESSURE: 100 MMHG | DIASTOLIC BLOOD PRESSURE: 89 MMHG | HEART RATE: 130 BPM | OXYGEN SATURATION: 95 %

## 2018-05-11 VITALS — HEART RATE: 124 BPM | SYSTOLIC BLOOD PRESSURE: 101 MMHG | DIASTOLIC BLOOD PRESSURE: 68 MMHG

## 2018-05-11 VITALS
HEART RATE: 123 BPM | TEMPERATURE: 97.7 F | OXYGEN SATURATION: 94 % | DIASTOLIC BLOOD PRESSURE: 80 MMHG | SYSTOLIC BLOOD PRESSURE: 111 MMHG

## 2018-05-11 VITALS — HEART RATE: 120 BPM

## 2018-05-11 DIAGNOSIS — R18.8: ICD-10-CM

## 2018-05-11 DIAGNOSIS — K31.89: ICD-10-CM

## 2018-05-11 DIAGNOSIS — E87.1: ICD-10-CM

## 2018-05-11 DIAGNOSIS — Z88.2: ICD-10-CM

## 2018-05-11 DIAGNOSIS — Z88.1: ICD-10-CM

## 2018-05-11 DIAGNOSIS — N18.4: ICD-10-CM

## 2018-05-11 DIAGNOSIS — I13.0: ICD-10-CM

## 2018-05-11 DIAGNOSIS — K76.0: Primary | ICD-10-CM

## 2018-05-11 DIAGNOSIS — J45.909: ICD-10-CM

## 2018-05-11 DIAGNOSIS — R00.0: ICD-10-CM

## 2018-05-11 DIAGNOSIS — I48.0: ICD-10-CM

## 2018-05-11 DIAGNOSIS — E78.5: ICD-10-CM

## 2018-05-11 DIAGNOSIS — T50.2X5A: ICD-10-CM
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
## 2018-05-11 DIAGNOSIS — G47.33: ICD-10-CM

## 2018-05-11 DIAGNOSIS — K21.9: ICD-10-CM

## 2018-05-11 DIAGNOSIS — Z79.01: ICD-10-CM

## 2018-05-11 DIAGNOSIS — Z88.8: ICD-10-CM

## 2018-05-11 DIAGNOSIS — K76.6: ICD-10-CM

## 2018-05-11 DIAGNOSIS — I50.33: ICD-10-CM

## 2018-05-11 LAB
ALBUMIN SERPL-MCNC: 2.4 GM/DL (ref 3.4–5)
ALP SERPL-CCNC: 215 U/L (ref 45–117)
ALT SERPL-CCNC: 18 U/L (ref 12–78)
AST SERPL-CCNC: 40 U/L (ref 15–37)
BASOPHILS # BLD: 0.02 K/UL (ref 0–0.2)
BASOPHILS NFR BLD: 0.3 %
BUN SERPL-MCNC: 36 MG/DL (ref 7–18)
CALCIUM SERPL-MCNC: 8.1 MG/DL (ref 8.5–10.1)
CO2 SERPL-SCNC: 29 MMOL/L (ref 21–32)
CREAT SERPL-MCNC: 2.01 MG/DL (ref 0.6–1.2)
EOS ABS #: 0.01 K/UL (ref 0–0.5)
EOSINOPHIL NFR BLD AUTO: 338 K/UL (ref 130–400)
GLUCOSE SERPL-MCNC: 130 MG/DL (ref 70–99)
HCT VFR BLD CALC: 36.9 % (ref 37–47)
HGB BLD-MCNC: 12.3 G/DL (ref 12–16)
IG#: 0.02 K/UL (ref 0–0.02)
IMM GRANULOCYTES NFR BLD AUTO: 13.5 %
INR PPP: 2.8 (ref 0.9–1.1)
LYMPHOCYTES # BLD: 0.94 K/UL (ref 1.2–3.4)
MCH RBC QN AUTO: 26.9 PG (ref 25–34)
MCHC RBC AUTO-ENTMCNC: 33.3 G/DL (ref 32–36)
MCV RBC AUTO: 80.7 FL (ref 80–100)
MONO ABS #: 0.86 K/UL (ref 0.11–0.59)
MONOCYTES NFR BLD: 12.3 %
NEUT ABS #: 5.12 K/UL (ref 1.4–6.5)
NEUTROPHILS # BLD AUTO: 0.1 %
NEUTROPHILS NFR BLD AUTO: 73.5 %
PMV BLD AUTO: 10 FL (ref 7.4–10.4)
POTASSIUM SERPL-SCNC: 3.8 MMOL/L (ref 3.5–5.1)
PROT SERPL-MCNC: 7.3 GM/DL (ref 6.4–8.2)
PTT PATIENT: 38.8 SECONDS (ref 21–31)
RED CELL DISTRIBUTION WIDTH CV: 17.5 % (ref 11.5–14.5)
RED CELL DISTRIBUTION WIDTH SD: 51.5 FL (ref 36.4–46.3)
SODIUM SERPL-SCNC: 135 MMOL/L (ref 136–145)
WBC # BLD AUTO: 6.97 K/UL (ref 4.8–10.8)

## 2018-05-11 RX ADMIN — FLUTICASONE PROPIONATE AND SALMETEROL SCH PUFF: 50; 100 POWDER RESPIRATORY (INHALATION) at 20:23

## 2018-05-11 RX ADMIN — WARFARIN SODIUM SCH MG: 2.5 TABLET ORAL at 17:54

## 2018-05-11 RX ADMIN — FUROSEMIDE SCH MLS/MIN: 10 INJECTION, SOLUTION INTRAMUSCULAR; INTRAVENOUS at 20:22

## 2018-05-11 NOTE — DIAGNOSTIC IMAGING REPORT
ASCITES ABDOMEN ULTRASOUND LIMITED



CLINICAL HISTORY: evaluate ascites    



COMPARISON STUDY:  Abdominal ultrasound 3/1/2018.



FINDINGS: Real-time sonographic imaging of the abdomen was performed. Moderate

amount of ascites seen throughout the abdomen. This is slightly progressed.



IMPRESSION:  Moderate ascites. 





 







Electronically signed by:  Cruz Clemens M.D.

5/11/2018 9:49 PM



Dictated Date/Time:  5/11/2018 9:48 PM

## 2018-05-11 NOTE — DIAGNOSTIC IMAGING REPORT
CHEST ONE VIEW PORTABLE



CLINICAL HISTORY: EVALUATE RESPIRATORY DISTRESS.DYSPNEA dyspnea



COMPARISON STUDY:  Multiple prior exams



FINDINGS: Stable mediastinal widening with a suggestion of a potential

right-sided aortic arch. Lateral hernia. Moderate stable cardiomegaly.



Bibasilar chronic pleural reactive change. This is considered chronic. 



IMPRESSION:  Chronic change. Mild stable cardiomegaly. No acute process. 











The above report was generated using voice recognition software.  It may contain

grammatical, syntax or spelling errors.









Electronically signed by:  Ernesto Gusman M.D.

5/11/2018 1:24 PM



Dictated Date/Time:  5/11/2018 1:22 PM

## 2018-05-11 NOTE — HISTORY AND PHYSICAL
History & Physical


Date & Time of Service:


May 11, 2018 at 15:57


Chief Complaint:


Volume Overload


Primary Care Physician:


Soham Roche MD


History of Present Illness


Source:  patient, clinic records, hospital records


This is an 84yo F with a PMH of CKD IV, diastolic CHF, NAFLD cirrhosis, 

paroxysmal A fib (on coumadin), HTN and other medical problems listed below who 

presents with increased BLE swelling x 1 week. Endorses an 8 pound weight gain 

in the last week as well as SOB and orthopnea. Denies increased fluid or sodium 

intake or medication non-compliance although admits that she has a hard time 

keeping track of medications. Was following up with Dr. Llamas in clinic 

today and was redirected to ED for further evaluation of volume overload. 

Follows with Dr. Alvarez in cardiology clinic and has been taking 40mg lasix BID 

and spironolactone 12.5mg daily. Recently established care with Dr. Baker 

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 Had presence of 

ascites but has not yet required paracentesis. 





Currently endorses BLE swelling and exertional SOB but denies fever, chills, 

lightheadedness, headache, visual changes, chest pain, palpitations, nausea, 

vomiting, abdominal pain, dysuria, melena or hematochezia.





Past Medical/Surgical History


Medical Problems:


(1) Acute electrocardiogram changes


(2) Ambulatory dysfunction


(3) Anticoagulated on warfarin


Medical Problems:


(1) Anticoagulated on warfarin


Status: Chronic  





(2) Asthma


Status: Chronic  





(3) Benign hypertension


Status: Chronic  





(4) CHF (congestive heart failure)


Permanent Comment: normal LVEF per echo Jan 2018


Status: Chronic  





(5) Chronic kidney disease (CKD), stage III (moderate)


Status: Chronic  





(6) Cirrhosis of liver


Permanent Comment: noted on CT Jan 2018; etiology to be determined


Status: Chronic  





(7) GERD (gastroesophageal reflux disease)


Status: Chronic  





(8) Hiatal hernia


Status: Chronic  





(9) CINDY on CPAP


Status: Chronic  





(10) Paroxysmal atrial fibrillation


Status: Chronic  





(11) s/p elective cardioversion


Permanent Comment: Sept 2013 at [facility] for atrial fibrillation


Status: Chronic  





(12) Seasonal allergies


Status: Chronic  





Surgical Problems:


(1) H/O: hysterectomy


Status: Chronic  





(2) S/P cataract surgery


Status: Chronic  








Family History





Cancer


FH: hypertension


Heart disease





Social History


Smoking Status:  Never Smoker


Drug Use:  none


Marital Status:  


Housing status:  lives with family





Immunizations


History of Influenza Vaccine:  No


Influenza Vaccine Date:  Oct 4, 2012


History of Tetanus Vaccine?:  Unknown


History of Pneumococcal:  No


Pneumococcal Date:  Apr 4, 2003


History of Hepatitis B Vaccine:  Unknown





Allergies


Coded Allergies:  


     Alcohol (Verified  Allergy, Mild, RASH, 3/26/18)


 WIPES OR INGESTED


     Cephalosporins (Verified  Allergy, Mild, RASH, 5/11/18)


     Codeine (Verified  Allergy, Mild, RASH, 5/11/18)


     Penicillins (Verified  Allergy, Mild, RASH, 5/11/18)


     Aspirin (Verified  Allergy, Unknown, RASH, 5/11/18)


     Levofloxacin (Verified  Allergy, Unknown, "BONE PAIN", 5/11/18)


     Sulfa Antibiotics (Verified  Allergy, Unknown, UNKNOWN, 5/11/18)





Home Medications


Scheduled


Amiodarone HCl (Amiodarone HCl), 200 MG PO QAM


Atorvastatin (Lipitor), 40 MG PO QAM


Ergocalciferol (Vitamin D 14713 Unit), 50,000 UNIT PO WK


Fluticasone Prop/Salmeterol (Advair Diskus 100/50 60 Dose), 1 PUFF INH BID


Metoprolol Succinate (Metoprolol Succinate ER), 12.5 MG PO QAM


Spironolactone (Spironolactone), 0.5 TAB PO DAILY


Warfarin Sod (Coumadin), 0.5 TAB PO [id number]


Warfarin Sod (Coumadin), 1 TAB PO TuSa





Scheduled PRN


Acetaminophen (Acetaminophen), 1,000 MG PO Q8 PRN for Pain or Fever


Albuterol Sulfate (Proair Respiclick), 2 PUFFS INH Q4H PRN for SOB/Wheezing


Diclofenac Sodium (Topical) (Voltaren 1% Top Gel), 1 APPLN TOP DAILY PRN for n


Fluticasone Propionate (Fluticasone Propionate), 2 SPRAYS BALDIMIR DAILY PRN for 

Allergy Symptoms


Omeprazole (Prilosec), 20 MG PO DAILY PRN for Acid Reflux


Ranitidine (Zantac), 300 MG PO HS PRN for Heart Burn/Acid Reflux


Tramadol HCl (Tramadol HCl), 50 MG PO BID PRN for Pain





Review of Systems


Constitutional:  No fever, No chills, No weight loss, No weakness, No fatigue


Eyes:  No worsening of vision


ENT:  No nasal symptoms, No sore throat


Respiratory:  + shortness of breath, + dyspnea on exertion, No cough, No sputum

, No wheezing


Cardiovascular:  + orthopnea, + edema, No chest pain, No palpitations


Abdomen:  No pain, No nausea, No vomiting, No diarrhea, No constipation, No GI 

bleeding


Genitourinary - Female:  No dysuria


Neurologic:  No weakness, No numbness/tingling


Integumentary:  No new/changing skin lesions





Physical Exam


Vital Signs











  Date Time  Temp Pulse Resp B/P (MAP) Pulse Ox O2 Delivery O2 Flow Rate FiO2


 


5/11/18 14:12  125 22 116/96 93 Room Air  


 


5/11/18 13:34  124 20 123/89 93 Room Air  


 


5/11/18 13:06     94 Room Air  


 


5/11/18 13:06     94 Room Air  


 


5/11/18 12:53  132      


 


5/11/18 12:22 36.5 137 22 114/78 95 Room Air  








General Appearance:  WD/WN, + mild distress, + pertinent finding (Tachypneic )


Head:  normocephalic, atraumatic


Eyes:  normal inspection, PERRL, sclerae normal


ENT:  normal ENT inspection, hearing grossly normal, pharynx normal (moist 

mucous membranes )


Neck:  supple, thyroid normal, trachea midline


Respiratory/Chest:  chest non-tender, lungs clear, normal breath sounds, no 

respiratory distress, no accessory muscle use


Cardiovascular:  normal peripheral pulses, + tachycardia, + pertinent finding (3

+ BLE edema )


Abdomen/GI:  non tender, soft, no organomegaly, + distended


Back:  normal inspection


Extremities/Musculoskelatal:  normal inspection, no calf tenderness, non-tender


Neurologic/Psych:  no motor/sensory deficits, alert, normal mood/affect, 

oriented x 3


Skin:  normal color, warm/dry, no rash, + pertinent finding (Bruising on arms )





Diagnostics


Laboratory Results





Results Past 24 Hours








Test


  5/11/18


13:03 5/11/18


14:22 Range/Units


 


 


White Blood Count 6.97  4.8-10.8  K/uL


 


Red Blood Count 4.57  4.2-5.4  M/uL


 


Hemoglobin 12.3  12.0-16.0  g/dL


 


Hematocrit 36.9  37-47  %


 


Mean Corpuscular Volume 80.7    fL


 


Mean Corpuscular Hemoglobin 26.9  25-34  pg


 


Mean Corpuscular Hemoglobin


Concent 33.3


  


  32-36  g/dl


 


 


Platelet Count 338  130-400  K/uL


 


Mean Platelet Volume 10.0  7.4-10.4  fL


 


Neutrophils (%) (Auto) 73.5   %


 


Lymphocytes (%) (Auto) 13.5   %


 


Monocytes (%) (Auto) 12.3   %


 


Eosinophils (%) (Auto) 0.1   %


 


Basophils (%) (Auto) 0.3   %


 


Neutrophils # (Auto) 5.12  1.4-6.5  K/uL


 


Lymphocytes # (Auto) 0.94  1.2-3.4  K/uL


 


Monocytes # (Auto) 0.86  0.11-0.59  K/uL


 


Eosinophils # (Auto) 0.01  0-0.5  K/uL


 


Basophils # (Auto) 0.02  0-0.2  K/uL


 


RDW Standard Deviation 51.5  36.4-46.3  fL


 


RDW Coefficient of Variation 17.5  11.5-14.5  %


 


Immature Granulocyte % (Auto) 0.3   %


 


Immature Granulocyte # (Auto) 0.02  0.00-0.02  K/uL


 


Prothrombin Time


  28.7


  


  9.0-12.0


SECONDS


 


Prothromb Time International


Ratio 2.8


  


  0.9-1.1  


 


 


Activated Partial


Thromboplast Time 38.8


  


  21.0-31.0


SECONDS


 


Partial Thromboplastin Ratio 1.5   


 


Sodium Level 135  136-145  mmol/L


 


Potassium Level 3.8  3.5-5.1  mmol/L


 


Chloride Level 98    mmol/L


 


Carbon Dioxide Level 29  21-32  mmol/L


 


Anion Gap 8.0  3-11  mmol/L


 


Blood Urea Nitrogen 36  7-18  mg/dl


 


Creatinine


  2.01


  


  0.60-1.20


mg/dl


 


Est Creatinine Clear Calc


Drug Dose 21.0


  


   ml/min


 


 


Estimated GFR (


American) 26.1


  


   


 


 


Estimated GFR (Non-


American 22.5


  


   


 


 


BUN/Creatinine Ratio 17.7  10-20  


 


Random Glucose 130  70-99  mg/dl


 


Calcium Level 8.1  8.5-10.1  mg/dl


 


Total Bilirubin 1.7  0.2-1  mg/dl


 


Aspartate Amino Transf


(AST/SGOT) 40


  


  15-37  U/L


 


 


Alanine Aminotransferase


(ALT/SGPT) 18


  


  12-78  U/L


 


 


Alkaline Phosphatase 215    U/L


 


Troponin I < 0.015  0-0.045  ng/ml


 


Pro-B-Type Natriuretic Peptide 2265  0-1800  pg/ml


 


Total Protein 7.3  6.4-8.2  gm/dl


 


Albumin 2.4  3.4-5.0  gm/dl


 


Globulin 4.9  2.5-4.0  gm/dl


 


Albumin/Globulin Ratio 0.5  0.9-2  


 


Urine Color  DK YELLOW  


 


Urine Appearance  CLOUDY CLEAR  


 


Urine pH  5.0 4.5-7.5  


 


Urine Specific Gravity  1.017 1.000-1.030  


 


Urine Protein  NEG NEG  


 


Urine Glucose (UA)  NEG NEG  


 


Urine Ketones  NEG NEG  


 


Urine Occult Blood  NEG NEG  


 


Urine Nitrite  NEG NEG  


 


Urine Bilirubin  NEG NEG  


 


Urine Urobilinogen  NEG NEG  


 


Urine Leukocyte Esterase  SMALL NEG  


 


Urine WBC (Auto)  5-10 0-5  /hpf


 


Urine RBC (Auto)  0-4 0-4  /hpf


 


Urine Hyaline Casts (Auto)  5-10 0-5  /lpf


 


Urine Epithelial Cells (Auto)  >30 0-5  /lpf


 


Urine Bacteria (Auto)  NEG NEG  











Impression


Assessment and Plan


This is an 84yo F with a PMH of CKD IV, diastolic CHF, NAFLD cirrhosis, 

paroxysmal A fib (on coumadin), HTN and other medical problems listed below who 

presents with increased BLE swelling x 1 week. 





Volume overload: 


-Multifactorial 2/2 CHF, CKD IV and cirrhosis 


-Received 40mg IV lasix in ED 


-Discussed with nephro in setting of CKD IV


   -40mg IV lasix BID 


-Restrict Na intake 


-Continue spironolactone at current dose of 12.5mg 





Acute on chronic diastolic CHF: 


-Non-compliant with diet 


-8 pound weight gain, SOB, BLE edema 


-CXR with mild stable cardiomegaly 


-Echo from 1/18 with normal EF 55-59%, diastolic dysfunction


-BNP elevated to 2265


-40mg IV lasix BID 


-Cont metoprolol 


-Stricts I&Os


-Daily weights 





CKD IV: 


-Per recent nephro note, new Cr baseline ~1.8-1.9 


-Cr of 2.1 today 


-Avoid ace and arbs 


-Monitor closely with administration of lasix


-Nephro consulted 





NAFLD cirrhosis: 


-Distention noted on abd exam 


-U/S abdomen for further evaluation of ascites 


-Monitor liver panel  





Sinus tachycardia: 


-Found to be in SVT with HR ~140 in ED prior to transfer upstairs 


-Discussed with Dr. Márquez and reviewed EKGs 


   -Rhythm determined as sinus tach 


   -Given IV lopressor 


-Monitor on telemetry 





Paroxysmal A Fib: 


-Currently in sinus tach 


-Continue home amiodarone, metoprolol  


-INR therapeutic at 2.8


-Continue coumadin at home dose 





CINDY: 


-CPAP HS 





GERD: 


-Cont PPI, H2 blocker 





DVT Ppx: continue coumadin 


Code status: DNR 


PCP: Melchor 


Dispo: Admitted to telemetry. Discharge planning ordered. 





Patient seen in collaboration with Dr. Knight. Please see addendum.





Attending Addendum:


The patient was seen and examined in telemetry unit.


Past medical history significant for CKD on dialysis A. fib with diastolic 

heart failure hypertension and also cirrhosis


She complains to have shortness of breath, swelling of feet and also swelling 

of abdomen for the last 1 week


She has also gained about 8 pounds for the last 1 week.


She was evaluated by Dr. Llamas the nephrologist today in the clinic and was 

advised to come to ER for further evaluation


Denies any chest pain, any abdominal pain, nausea and vomiting.








On examination


He denies any acute symptoms and her shortness of breath is better


Hemodynamically stable with tachycardia of 120s


Chest clear to auscultate bilaterally


Heart-irregular





Abdomen-distended, firm, mild to moderate ascites clinically, bowel sounds 

present


Extremities-1+ edema bilaterally


CNS;, alert, awake and oriented 3


No focal neuro deficit but generally weak





Lab,Imaging Studies and EKG reviewed


Agree with the assessment and plan as outlined Above.


Dr YOGI Knight





Resuscitation Status








VTE Prophylaxis


Will order VTE Prophylaxis:  Yes

## 2018-05-11 NOTE — EMERGENCY ROOM VISIT NOTE
History


Report prepared by Satinder:  Bobby Terrell


Under the Supervision of:  Dr. Rodriguez Paul D.O.


First contact with patient:  12:30


Chief Complaint:  OTHER COMPLAINT


Stated Complaint:  FLUID IN LEGS





History of Present Illness


The patient is an 83 year old female who presents to the Emergency Room with 

complaints of constant leg swelling beginning a week ago. The patient states 

that she has gained eight pounds in about a week. She notes that she has also 

has been experiencing SOB which worsens with exertion. She reports that her SOB 

does not worsen when she lies flat. She denies any CP and abdominal pain. The 

patient states that she saw Dr. Llamas today, and was told to come to the 

emergency department today for volume overload. She notes that she has a 

history of stage 4 renal disease but is not on dialysis. She reports that she 

is currently on Lasix and spironolactone.





   Source of History:  patient


   Onset:  a week ago


   Position:  leg (bilateral)


   Quality:  other (swelling)


   Timing:  constant


   Associated Symptoms:  + SOB, No chest pain, No abdominal pain


Note:


The patient states that she has gained eight pounds in a week.





Review of Systems


See HPI for pertinent positives & negatives. A total of 10 systems reviewed and 

were otherwise negative.





Past Medical & Surgical


Medical Problems:


(1) Anticoagulated on warfarin


(2) Asthma


(3) Benign hypertension


(4) CHF (congestive heart failure)


(5) Chronic kidney disease (CKD), stage III (moderate)


(6) Cirrhosis of liver


(7) Dyslipidemia


(8) GERD (gastroesophageal reflux disease)


(9) Hiatal hernia


(10) CINDY on CPAP


(11) Paroxysmal atrial fibrillation


(12) s/p elective cardioversion


(13) Seasonal allergies


(14) Stage 4 chronic kidney disease


(15) Volume overload


Surgical Problems:


(1) H/O: hysterectomy


(2) S/P cataract surgery








Family History





Cancer


FH: hypertension


Heart disease





Social History


Smoking Status:  Never Smoker


Alcohol Use:  none


Drug Use:  none


Marital Status:  


Housing Status:  lives with family


Occupation Status:  retired





Current/Historical Medications


Scheduled


Amiodarone HCl (Amiodarone HCl), 200 MG PO QAM


Atorvastatin (Lipitor), 40 MG PO QAM


Ergocalciferol (Vitamin D 98637 Unit), 50,000 UNIT PO WK


Fluticasone Prop/Salmeterol (Advair Diskus 100/50 60 Dose), 1 PUFF INH BID


Metoprolol Succinate (Metoprolol Succinate ER), 12.5 MG PO QAM


Spironolactone (Spironolactone), 0.5 TAB PO DAILY


Warfarin Sod (Coumadin), 0.5 TAB PO [id number]


Warfarin Sod (Coumadin), 1 TAB PO TuSa





Scheduled PRN


Acetaminophen (Acetaminophen), 1,000 MG PO Q8 PRN for Pain or Fever


Albuterol Sulfate (Proair Respiclick), 2 PUFFS INH Q4H PRN for SOB/Wheezing


Diclofenac Sodium (Topical) (Voltaren 1% Top Gel), 1 APPLN TOP DAILY PRN for n


Fluticasone Propionate (Fluticasone Propionate), 2 SPRAYS BLADIMIR DAILY PRN for 

Allergy Symptoms


Omeprazole (Prilosec), 20 MG PO DAILY PRN for Acid Reflux


Ranitidine (Zantac), 300 MG PO HS PRN for Heart Burn/Acid Reflux


Tramadol HCl (Tramadol HCl), 50 MG PO BID PRN for Pain





Allergies


Coded Allergies:  


     Alcohol (Verified  Allergy, Mild, RASH, 3/26/18)


 WIPES OR INGESTED


     Cephalosporins (Verified  Allergy, Mild, RASH, 5/11/18)


     Codeine (Verified  Allergy, Mild, RASH, 5/11/18)


     Penicillins (Verified  Allergy, Mild, RASH, 5/11/18)


     Aspirin (Verified  Allergy, Unknown, RASH, 5/11/18)


     Levofloxacin (Verified  Allergy, Unknown, "BONE PAIN", 5/11/18)


     Sulfa Antibiotics (Verified  Allergy, Unknown, UNKNOWN, 5/11/18)





Physical Exam


Vital Signs











  Date Time  Temp Pulse Resp B/P (MAP) Pulse Ox O2 Delivery O2 Flow Rate FiO2


 


5/11/18 14:12  125 22 116/96 93 Room Air  


 


5/11/18 13:34  124 20 123/89 93 Room Air  


 


5/11/18 13:06     94 Room Air  


 


5/11/18 13:06     94 Room Air  


 


5/11/18 12:53  132      


 


5/11/18 12:22 36.5 137 22 114/78 95 Room Air  











Physical Exam


GENERAL: Sitting up in bed, alert, well appearing, well nourished, no distress, 

non-toxic, dyspneic with conversation


EYE EXAM: normal conjunctiva.


OROPHARYNX: no exudate, no erythema, lips, buccal mucosa, and tongue normal and 

mucous membranes are moist


NECK: supple, no nuchal rigidity, no adenopathy, non-tender


LUNGS: Clear to auscultation. Normal chest wall mechanics


HEART: Tachycardic S1 normal and S2 normal 


ABDOMEN: abdomen soft, non-tender, normo-active bowel sounds, no masses, no 

rebound or guarding. 


BACK: Back is symmetrical on inspection and there is no deformity, no midline 

tenderness, no CVA tenderness. 


SKIN: no rashes and no bruising 


UPPER EXTREMITIES: upper extremities are grossly normal. 


LOWER EXTREMITIES: Bilateral pitting edema. 


NEURO EXAM: Normal sensorium, cranial nerves II-XII grossly intact, normal 

speech,  no gross weakness of arms, no gross weakness of legs.


Focused limited bedside ultrasound with no pericardial effusion.





Medical Decision & Procedures


ER Provider


Diagnostic Interpretation:


Radiology results as stated below per my review and interpretation: 





BEDSIDE US:





No pericardial effusion. 





CHEST ONE VIEW PORTABLE





FINDINGS: Stable mediastinal widening with a suggestion of a potential


right-sided aortic arch. Lateral hernia. Moderate stable cardiomegaly.





Bibasilar chronic pleural reactive change. This is considered chronic. 





IMPRESSION:  Chronic change. Mild stable cardiomegaly. No acute process. 





The above report was generated using voice recognition software.  It may contain


grammatical, syntax or spelling errors.





Electronically signed by:  Ernesto Gusman M.D.


5/11/2018 1:24 PM





Laboratory Results


5/11/18 13:03








Red Blood Count 4.57, Mean Corpuscular Volume 80.7, Mean Corpuscular Hemoglobin 

26.9, Mean Corpuscular Hemoglobin Concent 33.3, Mean Platelet Volume 10.0, 

Neutrophils (%) (Auto) 73.5, Lymphocytes (%) (Auto) 13.5, Monocytes (%) (Auto) 

12.3, Eosinophils (%) (Auto) 0.1, Basophils (%) (Auto) 0.3, Neutrophils # (Auto

) 5.12, Lymphocytes # (Auto) 0.94, Monocytes # (Auto) 0.86, Eosinophils # (Auto

) 0.01, Basophils # (Auto) 0.02





5/11/18 13:03

















Test


  5/11/18


13:03 5/11/18


14:22


 


White Blood Count


  6.97 K/uL


(4.8-10.8) 


 


 


Red Blood Count


  4.57 M/uL


(4.2-5.4) 


 


 


Hemoglobin


  12.3 g/dL


(12.0-16.0) 


 


 


Hematocrit 36.9 % (37-47)  


 


Mean Corpuscular Volume


  80.7 fL


() 


 


 


Mean Corpuscular Hemoglobin


  26.9 pg


(25-34) 


 


 


Mean Corpuscular Hemoglobin


Concent 33.3 g/dl


(32-36) 


 


 


Platelet Count


  338 K/uL


(130-400) 


 


 


Mean Platelet Volume


  10.0 fL


(7.4-10.4) 


 


 


Neutrophils (%) (Auto) 73.5 %  


 


Lymphocytes (%) (Auto) 13.5 %  


 


Monocytes (%) (Auto) 12.3 %  


 


Eosinophils (%) (Auto) 0.1 %  


 


Basophils (%) (Auto) 0.3 %  


 


Neutrophils # (Auto)


  5.12 K/uL


(1.4-6.5) 


 


 


Lymphocytes # (Auto)


  0.94 K/uL


(1.2-3.4) 


 


 


Monocytes # (Auto)


  0.86 K/uL


(0.11-0.59) 


 


 


Eosinophils # (Auto)


  0.01 K/uL


(0-0.5) 


 


 


Basophils # (Auto)


  0.02 K/uL


(0-0.2) 


 


 


RDW Standard Deviation


  51.5 fL


(36.4-46.3) 


 


 


RDW Coefficient of Variation


  17.5 %


(11.5-14.5) 


 


 


Immature Granulocyte % (Auto) 0.3 %  


 


Immature Granulocyte # (Auto)


  0.02 K/uL


(0.00-0.02) 


 


 


Prothrombin Time


  28.7 SECONDS


(9.0-12.0) 


 


 


Prothromb Time International


Ratio 2.8 (0.9-1.1) 


  


 


 


Activated Partial


Thromboplast Time 38.8 SECONDS


(21.0-31.0) 


 


 


Partial Thromboplastin Ratio 1.5  


 


Anion Gap


  8.0 mmol/L


(3-11) 


 


 


Est Creatinine Clear Calc


Drug Dose 21.0 ml/min 


  


 


 


Estimated GFR (


American) 26.1 


  


 


 


Estimated GFR (Non-


American 22.5 


  


 


 


BUN/Creatinine Ratio 17.7 (10-20)  


 


Calcium Level


  8.1 mg/dl


(8.5-10.1) 


 


 


Total Bilirubin


  1.7 mg/dl


(0.2-1) 


 


 


Aspartate Amino Transf


(AST/SGOT) 40 U/L (15-37) 


  


 


 


Alanine Aminotransferase


(ALT/SGPT) 18 U/L (12-78) 


  


 


 


Alkaline Phosphatase


  215 U/L


() 


 


 


Troponin I


  < 0.015 ng/ml


(0-0.045) 


 


 


Pro-B-Type Natriuretic Peptide


  2265 pg/ml


(0-1800) 


 


 


Total Protein


  7.3 gm/dl


(6.4-8.2) 


 


 


Albumin


  2.4 gm/dl


(3.4-5.0) 


 


 


Globulin


  4.9 gm/dl


(2.5-4.0) 


 


 


Albumin/Globulin Ratio 0.5 (0.9-2)  


 


Urine Color  DK YELLOW 


 


Urine Appearance  CLOUDY (CLEAR) 


 


Urine pH  5.0 (4.5-7.5) 


 


Urine Specific Gravity


  


  1.017


(1.000-1.030)


 


Urine Protein  NEG (NEG) 


 


Urine Glucose (UA)  NEG (NEG) 


 


Urine Ketones  NEG (NEG) 


 


Urine Occult Blood  NEG (NEG) 


 


Urine Nitrite  NEG (NEG) 


 


Urine Bilirubin  NEG (NEG) 


 


Urine Urobilinogen  NEG (NEG) 


 


Urine Leukocyte Esterase  SMALL (NEG) 


 


Urine WBC (Auto)


  


  5-10 /hpf


(0-5)


 


Urine RBC (Auto)  0-4 /hpf (0-4) 


 


Urine Hyaline Casts (Auto)


  


  5-10 /lpf


(0-5)


 


Urine Epithelial Cells (Auto)  >30 /lpf (0-5) 


 


Urine Bacteria (Auto)  NEG (NEG) 





Laboratory results per my review.





Medications Administered











 Medications


  (Trade)  Dose


 Ordered  Sig/Diego


 Route  Start Time


 Stop Time Status Last Admin


Dose Admin


 


 Furosemide


  (Lasix Inj)  40 mg  NOW  STAT


 IV  5/11/18 14:06


 5/11/18 14:07 DC 5/11/18 14:17


40 MG











ECG Per My Interpretation


Indication:  SOB/dyspnea


Rate (beats per minute):  131


Rhythm:  sinus tachycardia


Findings:  other (Low voltage, nonspecific ST changes in the inferior leads)


Comparison ECG Date:  1/4/2018


Change:


Compared to prior:


Low voltage and nonspecific ST changes are old.





ED Course


ED COURSE: 


Vital signs were reviewed and showed that the patient was tachycardic. 


The patients medical record was reviewed


The above diagnostic studies were performed and reviewed.


ED treatments and interventions as stated above. 





1230: The patient was evaluated in room B5. A complete history and physical 

examination was performed.





1406: Lasix Inj 40mg IV





1501: I performed a bedside US on the patient. 





1505: Upon reevaluation, the patient is stable. I discussed my findings with 

the patient and she understands and agrees with the treatment plan. Based on 

the patients age, coexisting illnesses, exam and lab findings the decision to 

treat as an inpatient was made. The patient remained stable while under my 

care. Discussed the patient's case with Galina Rogers PA-C on Call for ENRIQUE Alexis. The patient will be evaluated for further management.





Medical Decision


Differential diagnoses includes but is not limited to pneumonia, bronchitis, 

COPD/Asthma exacerbation, pneumothorax, pulmonary embolism, congestive heart 

failure, acute coronary syndrome.





Patient is an 83-year-old female who presents the ER referred in by her 

nephrologist for shortness of breath.  A 10 pound weight gain in the past week.

  She does admit to shortness of breath but denies any chest pain.  CBC was 

unremarkable.  Creatinine elevated at 2.  BNP was elevated at 2000.  Troponin 

negative.  INR therapeutic.  UA unremarkable.  Chest x-ray was fairly benign.  

Patient did have bilateral pitting edema.  Based on symptoms I do believe that 

she is likely volume overloaded.  EKG was obtained and did show initially were 

thought to believe was a sinus tach.  Her heart rate trended down to the low 

120s and eventually bumped back up.  At this time I did repeat EKG.  Again I 

believe this to be more sinus tach truly atrial flutter or SVT.  She was 

currently being transported up to the floor just a minute or 2 following the 

EKG. patient had no new symptoms.  I did give her a dose of Lasix upon initial 

presentation.





Medication Reconcilliation


Current Medication List:  was personally reviewed by me





Blood Pressure Screening


Patient's blood pressure:  Normal blood pressure


Blood pressure disposition:  Did not require urgent referral





Consults


Time Called:  1500


Consulting Physician:  Galina Rogers PA-C on Call for ENRIQUE Alexis


Returned Call:  1505


I reviewed the patient's case with Galina Del Rio.  She will evaluate the patient 

for further management.





Impression





 Primary Impression:  


 Volume overload


 Additional Impressions:  


 Tachycardia


 Anticoagulated on warfarin





Scribe Attestation


The scribe's documentation has been prepared under my direction and personally 

reviewed by me in its entirety. I confirm that the note above accurately 

reflects all work, treatment, procedures, and medical decision making performed 

by me.





Departure Information


Dispostion


Being Evaluated By Hospitalist





Referrals


Soham Roche MD (PCP)





Patient Instructions


My [facility]





Problem Qualifiers








 Primary Impression:  


 Volume overload


 Hypervolemia type:  unspecified  Qualified Codes:  E87.70 - Fluid overload, 

unspecified

## 2018-05-12 VITALS
TEMPERATURE: 97.34 F | OXYGEN SATURATION: 94 % | DIASTOLIC BLOOD PRESSURE: 81 MMHG | HEART RATE: 115 BPM | SYSTOLIC BLOOD PRESSURE: 107 MMHG

## 2018-05-12 VITALS
TEMPERATURE: 97.34 F | OXYGEN SATURATION: 91 % | DIASTOLIC BLOOD PRESSURE: 78 MMHG | HEART RATE: 118 BPM | SYSTOLIC BLOOD PRESSURE: 101 MMHG

## 2018-05-12 VITALS — HEART RATE: 106 BPM | OXYGEN SATURATION: 99 %

## 2018-05-12 VITALS — TEMPERATURE: 97.34 F | HEART RATE: 124 BPM | SYSTOLIC BLOOD PRESSURE: 121 MMHG | DIASTOLIC BLOOD PRESSURE: 96 MMHG

## 2018-05-12 VITALS
TEMPERATURE: 98.24 F | SYSTOLIC BLOOD PRESSURE: 102 MMHG | OXYGEN SATURATION: 95 % | DIASTOLIC BLOOD PRESSURE: 73 MMHG | HEART RATE: 101 BPM

## 2018-05-12 VITALS
HEART RATE: 77 BPM | DIASTOLIC BLOOD PRESSURE: 62 MMHG | SYSTOLIC BLOOD PRESSURE: 100 MMHG | OXYGEN SATURATION: 95 % | TEMPERATURE: 97.7 F

## 2018-05-12 VITALS
SYSTOLIC BLOOD PRESSURE: 98 MMHG | DIASTOLIC BLOOD PRESSURE: 78 MMHG | OXYGEN SATURATION: 95 % | TEMPERATURE: 97.88 F | HEART RATE: 110 BPM

## 2018-05-12 LAB
ALBUMIN SERPL-MCNC: 2.2 GM/DL (ref 3.4–5)
ALP SERPL-CCNC: 174 U/L (ref 45–117)
ALT SERPL-CCNC: 16 U/L (ref 12–78)
AST SERPL-CCNC: 35 U/L (ref 15–37)
BUN SERPL-MCNC: 35 MG/DL (ref 7–18)
CALCIUM SERPL-MCNC: 8.2 MG/DL (ref 8.5–10.1)
CO2 SERPL-SCNC: 29 MMOL/L (ref 21–32)
CREAT SERPL-MCNC: 1.76 MG/DL (ref 0.6–1.2)
EOSINOPHIL NFR BLD AUTO: 293 K/UL (ref 130–400)
GLUCOSE SERPL-MCNC: 76 MG/DL (ref 70–99)
HCT VFR BLD CALC: 34.1 % (ref 37–47)
HGB BLD-MCNC: 11.4 G/DL (ref 12–16)
INR PPP: 2.4 (ref 0.9–1.1)
MCH RBC QN AUTO: 26.7 PG (ref 25–34)
MCHC RBC AUTO-ENTMCNC: 33.4 G/DL (ref 32–36)
MCV RBC AUTO: 79.9 FL (ref 80–100)
PMV BLD AUTO: 10.5 FL (ref 7.4–10.4)
POTASSIUM SERPL-SCNC: 4 MMOL/L (ref 3.5–5.1)
PROT SERPL-MCNC: 6.3 GM/DL (ref 6.4–8.2)
RED CELL DISTRIBUTION WIDTH CV: 17.2 % (ref 11.5–14.5)
RED CELL DISTRIBUTION WIDTH SD: 49.7 FL (ref 36.4–46.3)
SODIUM SERPL-SCNC: 133 MMOL/L (ref 136–145)
WBC # BLD AUTO: 6.36 K/UL (ref 4.8–10.8)

## 2018-05-12 RX ADMIN — METOPROLOL TARTRATE SCH MG: 25 TABLET, FILM COATED ORAL at 21:19

## 2018-05-12 RX ADMIN — METOPROLOL TARTRATE SCH MG: 25 TABLET, FILM COATED ORAL at 17:03

## 2018-05-12 RX ADMIN — FUROSEMIDE SCH MLS/MIN: 10 INJECTION, SOLUTION INTRAMUSCULAR; INTRAVENOUS at 08:48

## 2018-05-12 RX ADMIN — FUROSEMIDE SCH MLS/MIN: 10 INJECTION, SOLUTION INTRAMUSCULAR; INTRAVENOUS at 17:04

## 2018-05-12 RX ADMIN — ATORVASTATIN CALCIUM SCH MG: 40 TABLET, FILM COATED ORAL at 08:49

## 2018-05-12 RX ADMIN — AMIODARONE HYDROCHLORIDE SCH MG: 200 TABLET ORAL at 08:49

## 2018-05-12 RX ADMIN — PANTOPRAZOLE SCH MG: 40 TABLET, DELAYED RELEASE ORAL at 08:48

## 2018-05-12 RX ADMIN — SPIRONOLACTONE SCH MG: 25 TABLET, FILM COATED ORAL at 08:49

## 2018-05-12 RX ADMIN — FLUTICASONE PROPIONATE AND SALMETEROL SCH PUFF: 50; 100 POWDER RESPIRATORY (INHALATION) at 21:19

## 2018-05-12 RX ADMIN — FLUTICASONE PROPIONATE AND SALMETEROL SCH PUFF: 50; 100 POWDER RESPIRATORY (INHALATION) at 08:48

## 2018-05-12 NOTE — CARDIOLOGY CONSULTATION
DATE OF CONSULTATION:  05/12/2018

 

CONSULTATION REQUESTED BY:  Galina Del Rio PA-C.

 

REASON FOR CONSULTATION:  Tachycardia and volume overload.

 

HISTORY OF PRESENT ILLNESS:  Mrs. Hewitt is a very pleasant 83-year-old woman

who normally follows with Dr. Alvarez of our cardiology practice for history of

diastolic dysfunction.  She presented to [facility] at the

advice of her nephrologist, Dr. Acosta on 05/11/2018 after routine

outpatient evaluation found the patient to be significantly volume overloaded

with the recent at least 8-pound weight gain.  At that time, the patient was

voicing concerns of increasing shortness of breath and lower extremity edema

and it was felt that the patient would be best managed by inpatient diuresis

with close monitoring of her renal function.  Upon presentation, the patient

states that she was feeling rather well at rest, but now after diuresing

overnight, she states that she is feeling much better.  She states that the

abdominal discomfort she was experiencing yesterday is resolving as is her

lower extremity edema.  Her shortness of breath has not changed at rest.  She

denies any chest pain, palpitations, lightheadedness, dizziness, or syncope. 

Upon presentation, she was initially found to be in a tachycardia in the

140s.  I reviewed the monitor strips and EKG with Mrs. Del Rio and the patient

was found to be in sinus tachycardia and was recommended.  She was started on

metoprolol.  The initial IV dose of metoprolol did slow her heart rate down

somewhat for some time; however, is now back to where it was previously. 

Again, she denies any palpitations.

 

PAST SURGICAL HISTORY:

1.  Hysterectomy.

2.  Upper endoscopy.

3.  Cataract surgery.

 

MEDICAL ILLNESSES:

1.  Paroxysmal atrial fibrillation.

2.  Diastolic dysfunction with normal LV systolic function.

3.  Hypertension.

4.  Cirrhosis, secondary to NICHOLSON.

5.  Hypertension.

6.  Obstructive sleep apnea, on CPAP.

7.  Right aortic arch.

8.  Portal hypertension.

 

FAMILY HISTORY:  Noncontributory.

 

SOCIAL HISTORY:  Denies any alcohol, tobacco or recreational drug use.

 

REVIEW OF SYSTEMS:  As per HPI.  All review of systems reviewed and negative

at this time.

 

ALLERGIES:

1.  ALCOHOL.

2.  ASPIRIN.

3.  CEPHALOSPORINS.

4.  CODEINE.

5.  LEVOFLOXACIN.

6.  PENICILLIN.

7.  SULFA.

 

MEDICATIONS AS AN OUTPATIENT:

1.  Amiodarone 200 mg daily.

2.  Atorvastatin 40 mg daily.

3.  Metoprolol succinate 12.5 mg daily.

4.  Spironolactone 12.5 mg daily.

5.  Coumadin as directed by the Coumadin clinic.

6.  Lasix 40 mg b.i.d.

 

PHYSICAL EXAMINATION:

VITALS:  Temperature 36.3, pulse 115, respiratory rate 12, blood pressure

107/81, saturating 94% on 2 liters nasal cannula.

GENERAL:  Awake, alert, oriented x3 in no acute distress.

HEENT:  Normocephalic, atraumatic.  Pupils equal, round and react to light

and accommodation.  Extraocular muscles intact.  Anicteric sclerae.  Moist

mucous membranes.

NECK:  No JVD, no bruit.

CARDIOVASCULAR:  Regular, but fast.  Unable to appreciate murmurs, rubs or

gallops.

PULMONARY:  Scant bibasilar crackles, otherwise clear.  No wheezing or

rhonchi.

ABDOMEN:  Bowel sounds x4, somewhat distended.  No rebound, guarding or

tenderness.  No organomegaly.

EXTREMITIES:  +2 bilateral lower extremity pitting edema above the knee.  No

clubbing or cyanosis.  +2 pedal pulses bilaterally.

SKIN:  Warm and dry.

 

TEST RESULTS:  A review of 12-lead EKGs and telemetry monitoring shows sinus

tachycardia with occasional PACs, no sustained arrhythmias.

 

IMPRESSION:

1.  Volume overload, multifactorial.

2.  Sinus tachycardia.

3.  Diastolic dysfunction with history of normal LV systolic function.

4.  Obstructive sleep apnea, nocturnal CPAP.

5.  Cirrhosis.

6.  Chronic kidney disease.

 

RECOMMENDATIONS:  It was my pleasure to see Mrs. Hewitt in consultation today.

 From a cardiac standpoint, the patient is definitely volume overloaded and

is most likely due to a combination of her diastolic dysfunction, chronic

kidney disease and cirrhosis.  Our nephrology colleagues are on board and

managing her diuretics and I will defer to their expertise.

 

In terms of her tachycardia, it is sinus tachycardia, so my concern at this

point would be the fact that she may have had a pulmonary embolism, so I do

recommend further workup even though her INR has been therapeutic and should

most likely benefit from a VQ scan at some point and lower extremity Dopplers

to rule out.  Otherwise, I will increase her metoprolol.  I will give her one

time dose of metoprolol tartrate 25 mg now, to see how heart rate and blood

pressure respond.  I will likely slowly titrate up her b.i.d. metoprolol. 

Otherwise, we maintain on her Coumadin therapy with a goal INR of 2-3.  I

will continue to monitor on telemetry and follow along with you.

## 2018-05-12 NOTE — NEPHROLOGY CONSULTATION
DATE OF CONSULTATION:  05/12/2018

 

ATTENDING OF RECORD:  Dr. Knight.

 

REASON FOR CONSULTATION:  DENY and volume overload.

 

HISTORY OF PRESENT ILLNESS:  This is an 83-year-old female with a baseline

creatinine of 1.8, who follows with my partner, Dr. Blanka Acosta who has

nonalcoholic fatty liver disease with cirrhosis, who has had worsening volume

overload for the past week, gained close to 10 pounds and is having worsening

shortness of breath over the past week.  The patient was admitted and started

on IV Lasix.  The patient's home diuretic dose was Lasix 40 mg b.i.d. and

spironolactone 12.5 mg daily.  The patient did recently see GI and had an EGD

showing portal hypertension, has not yet required a paracentesis.  The

patient slipped out of bed to chair overnight and is currently on

spironolactone 12.5 mg a day, Lasix 40 mg IV b.i.d.  Albumin is in the low

2s.  Protein negative on UA.

 

PAST MEDICAL AND SURGICAL HISTORY:  CKD stage IV; CHF; hypertension; GERD;

sleep apnea; paroxysmal Afib, on anticoagulation; history of hysterectomy;

cataract surgery; nonalcoholic fatty liver disease with cirrhosis.

 

SOCIAL HISTORY:  No smoking, no alcohol, no drugs.  Lives with family.



FAMILY HISTORY: No renal disease in family

 

CURRENT MEDICATIONS:  Coumadin 5 mg, amiodarone 200 mg a day, Lipitor 40 mg a

day, Toprol-XL 12.5 mg daily, spironolactone 12.5 mg daily, Protonix 40 mg

daily, Advair inhaler twice a day, vitamin D 50,000 units weekly, Lasix 40 mg

IV b.i.d.

 

REVIEW OF SYSTEMS:  Positive fatigue.  Positive shortness of breath. 

Positive swelling.  Positive decreased appetite.  No chest pain, no

headaches, no blurry vision, no dysphagia.  No itching, no rash, no diarrhea,

no constipation.  No dysuria, no hematuria.  All other review of systems

otherwise negative.

 

PHYSICAL EXAMINATION:

VITAL SIGNS:  Temperature 36.8, pulse in the 100s, respiratory rate 18, blood

pressure 102/73, satting 95% on 2 liters.

GENERAL:  Awake, alert, oriented x3.

EYES:  No scleral icterus.

ENT:  Moist mucous membranes.

NECK:  Supple.

PULMONARY:  Clear to auscultation.

CARDIAC:  Tachy.

ABDOMEN:  Bowel sounds positive, soft, nontender.

EXTREMITIES:  +2 pitting edema.

NEUROLOGICALLY:  Nonfocal during.

DERMATOLOGIC:  No rash or ulcers noted.

 

LABORATORY DATA:  UA shows small leukocyte esterase, 5-10 WBCs, 0-4 RBCs. 

INR is 2.4.  White count 6, H and H 11 and 34, platelet count is 293.  Sodium

level is 133, potassium is 4, chloride is 96, bicarbonate is 29.  BUN is 35,

creatinine is 1.76, down from 36 and 2.01.  T-bili is 1.8, albumin is 2.2. 

ProBNP 2265.  Troponin negative x1.  Abdominal ultrasound shows moderate

ascites.  Chest x-ray shows mild stable cardiomegaly, no acute process,

bibasilar chronic pleural reactive changes.

 

ASSESSMENT AND PLAN:  Chronic kidney disease stage IV with underlying

hypertension and cirrhosis of the liver, who presents with worsening volume

overload on oral Lasix and spironolactone as an outpatient, continuing the

spironolactone at current dose and switch the Lasix to 40 IV b.i.d. and we

will follow volume status and creatinine trend.  May need albumin with Lasix. 

for now,  we will do IV Lasix and see if we can control the

swelling.  The patient's abdomen does not look

significantly distended.  We will follow along.  Continue the current care.

 

I appreciate the consultation.

 

 

 

JOSE

## 2018-05-12 NOTE — PROGRESS NOTE
Medicine Progress Note


Date & Time of Visit:


May 12, 2018 at 16:00


.


Subjective





CC: 


Follow-up visit for CHF, cirrhosis with ascites, and other problems.





HPI: 


No fever.


Persistent dyspnea.  No cough.


No chest pain.


Persistent dependent edema.


Persistent abdominal distention.


No diarrhea, melena, hematochezia.


Voiding without difficulty.


.





ROS:


as noted above in HPI


.





Objective





Last 8 Hrs








  Date Time  Temp Pulse Resp B/P (MAP) Pulse Ox O2 Delivery O2 Flow Rate FiO2


 


5/12/18 15:17 36.3 118 20 101/78 (86) 91 Nasal Cannula 2.0 


 


5/12/18 12:02      Nasal Cannula 2.0 








Physical Exam:





General- sitting in chair, no distress


Lungs- few basilar rales; no respiratory distress


Cardiovascular- distant heart sounds; RRR, tachy; no murmur or gallop 

appreciated; + JVD; 2+ pretibial edema


Abdomen- + bowel sounds, distended, soft, nontender 


Extremities- no cyanosis; no calf tenderness 


Neuro- alert, oriented


Skin- warm & dry


.


Laboratory Results:





Last 24 Hours








Test


  5/12/18


05:27


 


White Blood Count 6.36 K/uL 


 


Red Blood Count 4.27 M/uL 


 


Hemoglobin 11.4 g/dL 


 


Hematocrit 34.1 % 


 


Mean Corpuscular Volume 79.9 fL 


 


Mean Corpuscular Hemoglobin 26.7 pg 


 


Mean Corpuscular Hemoglobin


Concent 33.4 g/dl 


 


 


RDW Standard Deviation 49.7 fL 


 


RDW Coefficient of Variation 17.2 % 


 


Platelet Count 293 K/uL 


 


Mean Platelet Volume 10.5 fL 


 


Prothrombin Time 25.1 SECONDS 


 


Prothromb Time International


Ratio 2.4 


 


 


Sodium Level 133 mmol/L 


 


Potassium Level 4.0 mmol/L 


 


Chloride Level 96 mmol/L 


 


Carbon Dioxide Level 29 mmol/L 


 


Anion Gap 8.0 mmol/L 


 


Blood Urea Nitrogen 35 mg/dl 


 


Creatinine 1.76 mg/dl 


 


Est Creatinine Clear Calc


Drug Dose 24.0 ml/min 


 


 


Estimated GFR (


American) 30.7 


 


 


Estimated GFR (Non-


American 26.5 


 


 


BUN/Creatinine Ratio 19.9 


 


Random Glucose 76 mg/dl 


 


Calcium Level 8.2 mg/dl 


 


Total Bilirubin 1.8 mg/dl 


 


Aspartate Amino Transf


(AST/SGOT) 35 U/L 


 


 


Alanine Aminotransferase


(ALT/SGPT) 16 U/L 


 


 


Alkaline Phosphatase 174 U/L 


 


Total Protein 6.3 gm/dl 


 


Albumin 2.2 gm/dl 


 


Globulin 4.1 gm/dl 


 


Albumin/Globulin Ratio 0.5 











Assessment & Plan





CHF


Acute on chronic left ventricular diastolic heart failure.


Cardiology consulted.


Continue IV diuretics.





PAF


Continue amiodarone, metoprolol, and warfarin.





HYPERTENSION


Continue metoprolol.





SLEEP APNEA


Continue CPAP.





CIRRHOSIS


US demonstrated moderate ascites.


Total bilirubin = 1.8.


Continue diuretics.





CKD III


Serum creatinine 2.01 at time of admission.


Nephrology consulted.


Creatinine today = 1.76.





VTE PROPHYLAXIS


Warfarin.


Ambulate.





DISPOSITION


Expected discharge to home.


Internal Medicine follow-up with Dr. Roche.


.


Current Inpatient Medications:





Current Inpatient Medications








 Medications


  (Trade)  Dose


 Ordered  Sig/Diego


 Route  Start Time


 Stop Time Status Last Admin


Dose Admin


 


 Furosemide 40 mg/


 Syringe  4 ml @ 4


 mls/min  BID17


 IV  5/11/18 17:00


 6/10/18 16:59  5/12/18 17:04


4 MLS/MIN


 


 Acetaminophen


  (Tylenol Tab)  1,000 mg  Q8  PRN


 PO  5/11/18 15:45


 6/10/18 15:44   


 


 


 Amiodarone HCl


  (Cordarone Tab)  200 mg  QAM


 PO  5/12/18 09:00


 6/11/18 08:59  5/12/18 08:49


200 MG


 


 Atorvastatin


 Calcium


  (Lipitor Tab)  40 mg  QAM


 PO  5/12/18 09:00


 6/11/18 08:59  5/12/18 08:49


40 MG


 


 Diclofenac Sodium


  (Voltaren 1% Top


 Gel)  1 appln  DAILY  PRN


 EXT  5/11/18 15:45


 6/10/18 15:44   


 


 


 Ergocalciferol


  (Vitamin D Cap)  50,000


 interunit  Fr@0900


 PO  5/11/18 18:00


 6/10/18 17:59  5/11/18 17:54


50,000 INTERUNIT


 


 Salmeterol


 Xinafoate/


 Fluticasone


  (Advair Diskus


 100/50 Inh)  1 puff  BID


 INH  5/11/18 21:00


 6/10/18 20:59  5/12/18 08:48


1 PUFF


 


 Fluticasone


 Propionate


  (Flonase Nasal


 Spray)  2 sprays  DAILY  PRN


 BLADIMIR  5/11/18 15:45


 6/10/18 15:44   


 


 


 Metoprolol


 Succinate


  (Toprol Xl Tab)  12.5 mg  QAM


 PO  5/12/18 09:00


 6/11/18 08:59 Future Hold 5/12/18 08:49


12.5 MG


 


 Spironolactone


  (Aldactone Tab)  12.5 mg  DAILY


 PO  5/12/18 09:00


 6/11/18 08:59  5/12/18 08:49


12.5 MG


 


 Tramadol HCl


  (Ultram Tab)  50 mg  BID  PRN


 PO  5/11/18 15:45


 6/10/18 15:44   


 


 


 Warfarin Sodium


  (Coumadin Tab)  2.5 mg  SuMoWeThFr@1600


 PO  5/11/18 17:00


 6/10/18 16:59  5/11/18 17:54


2.5 MG


 


 Warfarin Sodium


  (Coumadin Tab)  5 mg  TuSa@1600


 PO  5/12/18 16:00


 6/11/18 15:59  5/12/18 17:04


5 MG


 


 Albuterol


  (Ventolin Hfa


 Inhaler)  2 puffs  Q4H  PRN


 INH  5/11/18 15:45


 6/10/18 15:44   


 


 


 Ranitidine HCl


  (zANTac TAB)  300 mg  HS  PRN


 PO  5/11/18 15:45


 6/10/18 15:44   


 


 


 Pantoprazole


 Sodium


  (Protonix Tab)  40 mg  QAM


 PO  5/12/18 09:00


 6/11/18 08:59  5/12/18 08:48


40 MG


 


 Metoprolol


 Tartrate


  (Lopressor Tab)  25 mg  QID


 PO  5/12/18 17:00


 6/11/18 16:59  5/12/18 17:03


25 MG

## 2018-05-13 VITALS
SYSTOLIC BLOOD PRESSURE: 91 MMHG | OXYGEN SATURATION: 95 % | DIASTOLIC BLOOD PRESSURE: 60 MMHG | TEMPERATURE: 97.52 F | HEART RATE: 80 BPM

## 2018-05-13 VITALS
SYSTOLIC BLOOD PRESSURE: 104 MMHG | HEART RATE: 77 BPM | DIASTOLIC BLOOD PRESSURE: 65 MMHG | OXYGEN SATURATION: 95 % | TEMPERATURE: 98.24 F

## 2018-05-13 VITALS
HEART RATE: 84 BPM | DIASTOLIC BLOOD PRESSURE: 61 MMHG | TEMPERATURE: 98.06 F | OXYGEN SATURATION: 99 % | SYSTOLIC BLOOD PRESSURE: 97 MMHG

## 2018-05-13 VITALS
OXYGEN SATURATION: 99 % | DIASTOLIC BLOOD PRESSURE: 63 MMHG | SYSTOLIC BLOOD PRESSURE: 103 MMHG | TEMPERATURE: 97.52 F | HEART RATE: 75 BPM

## 2018-05-13 VITALS
OXYGEN SATURATION: 93 % | DIASTOLIC BLOOD PRESSURE: 65 MMHG | HEART RATE: 68 BPM | SYSTOLIC BLOOD PRESSURE: 130 MMHG | TEMPERATURE: 97.52 F

## 2018-05-13 VITALS — DIASTOLIC BLOOD PRESSURE: 69 MMHG | TEMPERATURE: 97.34 F | HEART RATE: 83 BPM | SYSTOLIC BLOOD PRESSURE: 103 MMHG

## 2018-05-13 LAB
ALBUMIN SERPL-MCNC: 2.1 GM/DL (ref 3.4–5)
ALP SERPL-CCNC: 177 U/L (ref 45–117)
ALT SERPL-CCNC: 16 U/L (ref 12–78)
AST SERPL-CCNC: 35 U/L (ref 15–37)
BUN SERPL-MCNC: 38 MG/DL (ref 7–18)
CALCIUM SERPL-MCNC: 8.2 MG/DL (ref 8.5–10.1)
CO2 SERPL-SCNC: 29 MMOL/L (ref 21–32)
CREAT SERPL-MCNC: 1.87 MG/DL (ref 0.6–1.2)
EOSINOPHIL NFR BLD AUTO: 312 K/UL (ref 130–400)
GLUCOSE SERPL-MCNC: 76 MG/DL (ref 70–99)
HCT VFR BLD CALC: 34.1 % (ref 37–47)
HGB BLD-MCNC: 11.4 G/DL (ref 12–16)
INR PPP: 2.3 (ref 0.9–1.1)
MCH RBC QN AUTO: 26.6 PG (ref 25–34)
MCHC RBC AUTO-ENTMCNC: 33.4 G/DL (ref 32–36)
MCV RBC AUTO: 79.7 FL (ref 80–100)
PMV BLD AUTO: 10.4 FL (ref 7.4–10.4)
POTASSIUM SERPL-SCNC: 4.3 MMOL/L (ref 3.5–5.1)
PROT SERPL-MCNC: 6.4 GM/DL (ref 6.4–8.2)
RED CELL DISTRIBUTION WIDTH CV: 17.2 % (ref 11.5–14.5)
RED CELL DISTRIBUTION WIDTH SD: 48.8 FL (ref 36.4–46.3)
SODIUM SERPL-SCNC: 129 MMOL/L (ref 136–145)
WBC # BLD AUTO: 7.9 K/UL (ref 4.8–10.8)

## 2018-05-13 RX ADMIN — PANTOPRAZOLE SCH MG: 40 TABLET, DELAYED RELEASE ORAL at 08:09

## 2018-05-13 RX ADMIN — FUROSEMIDE SCH MLS/MIN: 10 INJECTION, SOLUTION INTRAMUSCULAR; INTRAVENOUS at 17:40

## 2018-05-13 RX ADMIN — METOPROLOL TARTRATE SCH MG: 25 TABLET, FILM COATED ORAL at 13:27

## 2018-05-13 RX ADMIN — FUROSEMIDE SCH MLS/MIN: 10 INJECTION, SOLUTION INTRAMUSCULAR; INTRAVENOUS at 08:08

## 2018-05-13 RX ADMIN — FLUTICASONE PROPIONATE AND SALMETEROL SCH PUFF: 50; 100 POWDER RESPIRATORY (INHALATION) at 20:26

## 2018-05-13 RX ADMIN — METOPROLOL TARTRATE SCH MG: 25 TABLET, FILM COATED ORAL at 20:27

## 2018-05-13 RX ADMIN — METOPROLOL TARTRATE SCH MG: 25 TABLET, FILM COATED ORAL at 08:09

## 2018-05-13 RX ADMIN — AMIODARONE HYDROCHLORIDE SCH MG: 200 TABLET ORAL at 08:09

## 2018-05-13 RX ADMIN — FLUTICASONE PROPIONATE AND SALMETEROL SCH PUFF: 50; 100 POWDER RESPIRATORY (INHALATION) at 08:08

## 2018-05-13 RX ADMIN — SPIRONOLACTONE SCH MG: 25 TABLET, FILM COATED ORAL at 08:09

## 2018-05-13 RX ADMIN — WARFARIN SODIUM SCH MG: 2.5 TABLET ORAL at 17:40

## 2018-05-13 RX ADMIN — ATORVASTATIN CALCIUM SCH MG: 40 TABLET, FILM COATED ORAL at 08:09

## 2018-05-13 NOTE — NEPHROLOGY PROGRESS NOTE
Nephrology Progress Note


Date of Service:


May 13, 2018.


Subjective


82 yo female with ckd stage 4 and nafld with cirrhosis presented with volume 

overload.  currently on lasix and spironolactone.  pt urinating well. pt though 

was eating a lot of ice chips yesterday and sodium levels did trend down this 

morning.





Objective











  Date Time  Temp Pulse Resp B/P (MAP) Pulse Ox O2 Delivery O2 Flow Rate FiO2


 


5/13/18 04:00      Nasal Cannula 2.0 


 


5/13/18 03:40 36.4 80 18 91/60 (70) 95 BiPAP  


 


5/13/18 00:06 36.7 84 16 97/61 (73) 99   


 


5/12/18 23:59      Nasal Cannula 2.0 


 


5/12/18 23:54  106   99   40


 


5/12/18 20:00      Nasal Cannula 2.0 


 


5/12/18 19:31 36.5 77 18 100/62 (75) 95 Nasal Cannula 2.0 


 


5/12/18 16:00      Nasal Cannula 2.0 


 


5/12/18 15:17 36.3 118 20 101/78 (86) 91 Nasal Cannula 2.0 


 


5/12/18 12:02      Nasal Cannula 2.0 


 


5/12/18 10:34 36.3 124 20 121/96 (104)    


 


5/12/18 08:05      Nasal Cannula 2.0 








Physical Exam:


General-aaox3, obese


Eyes-no scleral icterus


ENT-mmm


Neck-supple


Lungs-basilar rales


Heart-regular with ectopy


Abdomen-+pannus


Extremities-+3 edema


Neuro-nonfocal





Current Inpatient Medications








 Medications


  (Trade)  Dose


 Ordered  Sig/Diego


 Route  Start Time


 Stop Time Status Last Admin


Dose Admin


 


 Furosemide 40 mg/


 Syringe  4 ml @ 4


 mls/min  BID17


 IV  5/11/18 17:00


 6/10/18 16:59  5/12/18 17:04


4 MLS/MIN


 


 Acetaminophen


  (Tylenol Tab)  1,000 mg  Q8  PRN


 PO  5/11/18 15:45


 6/10/18 15:44   


 


 


 Amiodarone HCl


  (Cordarone Tab)  200 mg  QAM


 PO  5/12/18 09:00


 6/11/18 08:59  5/12/18 08:49


200 MG


 


 Atorvastatin


 Calcium


  (Lipitor Tab)  40 mg  QAM


 PO  5/12/18 09:00


 6/11/18 08:59  5/12/18 08:49


40 MG


 


 Diclofenac Sodium


  (Voltaren 1% Top


 Gel)  1 appln  DAILY  PRN


 EXT  5/11/18 15:45


 6/10/18 15:44   


 


 


 Ergocalciferol


  (Vitamin D Cap)  50,000


 interunit  Fr@0900


 PO  5/11/18 18:00


 6/10/18 17:59  5/11/18 17:54


50,000 INTERUNIT


 


 Salmeterol


 Xinafoate/


 Fluticasone


  (Advair Diskus


 100/50 Inh)  1 puff  BID


 INH  5/11/18 21:00


 6/10/18 20:59  5/12/18 21:19


1 PUFF


 


 Fluticasone


 Propionate


  (Flonase Nasal


 Spray)  2 sprays  DAILY  PRN


 BLADIMIR  5/11/18 15:45


 6/10/18 15:44   


 


 


 Metoprolol


 Succinate


  (Toprol Xl Tab)  12.5 mg  QAM


 PO  5/12/18 09:00


 6/11/18 08:59 Future Hold 5/12/18 08:49


12.5 MG


 


 Spironolactone


  (Aldactone Tab)  12.5 mg  DAILY


 PO  5/12/18 09:00


 6/11/18 08:59  5/12/18 08:49


12.5 MG


 


 Tramadol HCl


  (Ultram Tab)  50 mg  BID  PRN


 PO  5/11/18 15:45


 6/10/18 15:44   


 


 


 Warfarin Sodium


  (Coumadin Tab)  2.5 mg  SuMoWeThFr@1600


 PO  5/11/18 17:00


 6/10/18 16:59  5/11/18 17:54


2.5 MG


 


 Warfarin Sodium


  (Coumadin Tab)  5 mg  TuSa@1600


 PO  5/12/18 16:00


 6/11/18 15:59  5/12/18 17:04


5 MG


 


 Albuterol


  (Ventolin Hfa


 Inhaler)  2 puffs  Q4H  PRN


 INH  5/11/18 15:45


 6/10/18 15:44   


 


 


 Ranitidine HCl


  (zANTac TAB)  300 mg  HS  PRN


 PO  5/11/18 15:45


 6/10/18 15:44   


 


 


 Pantoprazole


 Sodium


  (Protonix Tab)  40 mg  QAM


 PO  5/12/18 09:00


 6/11/18 08:59  5/12/18 08:48


40 MG


 


 Metoprolol


 Tartrate


  (Lopressor Tab)  25 mg  QID


 PO  5/12/18 17:00


 6/11/18 16:59  5/12/18 21:19


25 MG











Last 24 Hours








Test


  5/13/18


05:26


 


White Blood Count 7.90 K/uL 


 


Red Blood Count 4.28 M/uL 


 


Hemoglobin 11.4 g/dL 


 


Hematocrit 34.1 % 


 


Mean Corpuscular Volume 79.7 fL 


 


Mean Corpuscular Hemoglobin 26.6 pg 


 


Mean Corpuscular Hemoglobin


Concent 33.4 g/dl 


 


 


RDW Standard Deviation 48.8 fL 


 


RDW Coefficient of Variation 17.2 % 


 


Platelet Count 312 K/uL 


 


Mean Platelet Volume 10.4 fL 


 


Prothrombin Time 24.1 SECONDS 


 


Prothromb Time International


Ratio 2.3 


 


 


Sodium Level 129 mmol/L 


 


Potassium Level 4.3 mmol/L 


 


Chloride Level 92 mmol/L 


 


Carbon Dioxide Level 29 mmol/L 


 


Anion Gap 8.0 mmol/L 


 


Blood Urea Nitrogen 38 mg/dl 


 


Creatinine 1.87 mg/dl 


 


Est Creatinine Clear Calc


Drug Dose 22.7 ml/min 


 


 


Estimated GFR (


American) 28.5 


 


 


Estimated GFR (Non-


American 24.6 


 


 


BUN/Creatinine Ratio 20.3 


 


Random Glucose 76 mg/dl 


 


Calcium Level 8.2 mg/dl 


 


Magnesium Level 2.1 mg/dl 


 


Total Bilirubin 1.7 mg/dl 


 


Aspartate Amino Transf


(AST/SGOT) 35 U/L 


 


 


Alanine Aminotransferase


(ALT/SGPT) 16 U/L 


 


 


Alkaline Phosphatase 177 U/L 


 


Total Protein 6.4 gm/dl 


 


Albumin 2.1 gm/dl 


 


Globulin 4.3 gm/dl 


 


Albumin/Globulin Ratio 0.5 











Assessment & Plan


ckd stage 4-creatinine stable during this hospitilization. ranging from 1.8 to 

2.  continue current diuretics. urinated over a liter. may consider titrating 

up the diuretics tomorrow given the significant edema.  





hyponatremia-sodium levels trending down in the setting of appropriate 

diuresis. feel it may be from extra intake of fluids/ice chips.  placing on a 

1200 cc fluid restriction for now.  hopefully sodium levels start to trend back 

up again. ok to continue the spironolactone but may need to stop it tomorrow if 

sodium levels drop more.

## 2018-05-13 NOTE — PROGRESS NOTE
Medicine Progress Note


Date & Time of Visit:


May 13, 2018 at 11:00


Subjective





CC: 


Follow-up visit for CHF, cirrhosis with ascites, and other problems.





HPI: 


No fever.


Persistent dyspnea.  


No significant cough.


No chest pain.


Persistent dependent edema; reluctant to elevate her legs.


Persistent abdominal distention.


No diarrhea, melena, hematochezia.


Voiding without difficulty.


.





ROS:


as noted above in HPI


.





Objective





Last 8 Hrs








  Date Time  Temp Pulse Resp B/P (MAP) Pulse Ox O2 Delivery O2 Flow Rate FiO2


 


5/13/18 12:02      Room Air  


 


5/13/18 11:02 36.4 68 20 130/65 (86) 93 Nasal Cannula 3.0 


 


5/13/18 08:06      Room Air  


 


5/13/18 07:05 36.4 75 20 103/63 (76) 99 Nasal Cannula 2.0 








Physical Exam:





General- sitting in chair, no distress


Lungs- few basilar rales; no respiratory distress


Cardiovascular- distant heart sounds; RRR, tachy; no murmur or gallop 

appreciated; + JVD; 2-3+ pretibial edema


Abdomen- + bowel sounds, distended, soft, nontender 


Extremities- no cyanosis; no calf tenderness 


Neuro- alert, oriented


Skin- warm & dry


.


Laboratory Results:





Last 24 Hours








Test


  5/13/18


05:26


 


White Blood Count 7.90 K/uL 


 


Red Blood Count 4.28 M/uL 


 


Hemoglobin 11.4 g/dL 


 


Hematocrit 34.1 % 


 


Mean Corpuscular Volume 79.7 fL 


 


Mean Corpuscular Hemoglobin 26.6 pg 


 


Mean Corpuscular Hemoglobin


Concent 33.4 g/dl 


 


 


RDW Standard Deviation 48.8 fL 


 


RDW Coefficient of Variation 17.2 % 


 


Platelet Count 312 K/uL 


 


Mean Platelet Volume 10.4 fL 


 


Prothrombin Time 24.1 SECONDS 


 


Prothromb Time International


Ratio 2.3 


 


 


Sodium Level 129 mmol/L 


 


Potassium Level 4.3 mmol/L 


 


Chloride Level 92 mmol/L 


 


Carbon Dioxide Level 29 mmol/L 


 


Anion Gap 8.0 mmol/L 


 


Blood Urea Nitrogen 38 mg/dl 


 


Creatinine 1.87 mg/dl 


 


Est Creatinine Clear Calc


Drug Dose 22.7 ml/min 


 


 


Estimated GFR (


American) 28.5 


 


 


Estimated GFR (Non-


American 24.6 


 


 


BUN/Creatinine Ratio 20.3 


 


Random Glucose 76 mg/dl 


 


Calcium Level 8.2 mg/dl 


 


Magnesium Level 2.1 mg/dl 


 


Total Bilirubin 1.7 mg/dl 


 


Aspartate Amino Transf


(AST/SGOT) 35 U/L 


 


 


Alanine Aminotransferase


(ALT/SGPT) 16 U/L 


 


 


Alkaline Phosphatase 177 U/L 


 


Total Protein 6.4 gm/dl 


 


Albumin 2.1 gm/dl 


 


Globulin 4.3 gm/dl 


 


Albumin/Globulin Ratio 0.5 











Assessment & Plan





CHF


Acute on chronic left ventricular diastolic heart failure.


Cardiology consulted.


Fluid restriction recommended.


Continue spironolactone and IV furosemide.


Monitor I/O's, weights.





PAF


Continue amiodarone, metoprolol, and warfarin.





HYPERTENSION


Continue metoprolol.





SLEEP APNEA


Continue CPAP.





CIRRHOSIS


US demonstrated moderate ascites.


Total bilirubin = 1.7.


Continue diuretics.





CKD III


Serum creatinine 2.01 at time of admission.


Nephrology consulted.


Creatinine today = 1.87.





HYPONATREMIA


Serum sodium 135 at time of admission.


Sodium today = 129.


Hyponatremia probably secondary to diuretic therapy.


Follow.





VTE PROPHYLAXIS


INR 2.3.  


Continue warfarin.


Ambulate.





DISPOSITION


Expected discharge to home.


Internal Medicine follow-up with Dr. Roche.


.


Current Inpatient Medications:





Current Inpatient Medications








 Medications


  (Trade)  Dose


 Ordered  Sig/Diego


 Route  Start Time


 Stop Time Status Last Admin


Dose Admin


 


 Furosemide 40 mg/


 Syringe  4 ml @ 4


 mls/min  BID17


 IV  5/11/18 17:00


 6/10/18 16:59  5/13/18 08:08


4 MLS/MIN


 


 Acetaminophen


  (Tylenol Tab)  1,000 mg  Q8  PRN


 PO  5/11/18 15:45


 6/10/18 15:44   


 


 


 Amiodarone HCl


  (Cordarone Tab)  200 mg  QAM


 PO  5/12/18 09:00


 6/11/18 08:59  5/13/18 08:09


200 MG


 


 Atorvastatin


 Calcium


  (Lipitor Tab)  40 mg  QAM


 PO  5/12/18 09:00


 6/11/18 08:59  5/13/18 08:09


40 MG


 


 Diclofenac Sodium


  (Voltaren 1% Top


 Gel)  1 appln  DAILY  PRN


 EXT  5/11/18 15:45


 6/10/18 15:44   


 


 


 Ergocalciferol


  (Vitamin D Cap)  50,000


 interunit  Fr@0900


 PO  5/11/18 18:00


 6/10/18 17:59  5/11/18 17:54


50,000 INTERUNIT


 


 Salmeterol


 Xinafoate/


 Fluticasone


  (Advair Diskus


 100/50 Inh)  1 puff  BID


 INH  5/11/18 21:00


 6/10/18 20:59  5/13/18 08:08


1 PUFF


 


 Fluticasone


 Propionate


  (Flonase Nasal


 Spray)  2 sprays  DAILY  PRN


 BLADIMIR  5/11/18 15:45


 6/10/18 15:44   


 


 


 Metoprolol


 Succinate


  (Toprol Xl Tab)  12.5 mg  QAM


 PO  5/12/18 09:00


 6/11/18 08:59 Future Hold 5/12/18 08:49


12.5 MG


 


 Spironolactone


  (Aldactone Tab)  12.5 mg  DAILY


 PO  5/12/18 09:00


 6/11/18 08:59  5/13/18 08:09


12.5 MG


 


 Tramadol HCl


  (Ultram Tab)  50 mg  BID  PRN


 PO  5/11/18 15:45


 6/10/18 15:44   


 


 


 Warfarin Sodium


  (Coumadin Tab)  2.5 mg  SuMoWeThFr@1600


 PO  5/11/18 17:00


 6/10/18 16:59  5/11/18 17:54


2.5 MG


 


 Warfarin Sodium


  (Coumadin Tab)  5 mg  TuSa@1600


 PO  5/12/18 16:00


 6/11/18 15:59  5/12/18 17:04


5 MG


 


 Albuterol


  (Ventolin Hfa


 Inhaler)  2 puffs  Q4H  PRN


 INH  5/11/18 15:45


 6/10/18 15:44   


 


 


 Ranitidine HCl


  (zANTac TAB)  300 mg  HS  PRN


 PO  5/11/18 15:45


 6/10/18 15:44   


 


 


 Pantoprazole


 Sodium


  (Protonix Tab)  40 mg  QAM


 PO  5/12/18 09:00


 6/11/18 08:59  5/13/18 08:09


40 MG


 


 Metoprolol


 Tartrate


  (Lopressor Tab)  25 mg  TID


 PO  5/13/18 14:00


 6/11/18 16:59

## 2018-05-13 NOTE — CARDIOLOGY FOLLOW-UP
Subjective


Subjective


Date of Service:


May 13, 2018.


Pt evaluation today including:  conversation w/ patient, physical exam, chart 

review, lab review, review of studies, review of inpatient medication list


Additional Details:


Pt seen and examined, oob in chair. States that she's feeling "ok". Occasional 

sob, she believes it to be due to post nasal drip. Denies cp, palpitations, 

lightheadedness or dizziness. Pt 


prefers to sit in chair with feet hanging down.





Tele reviewed: rates significantly decreased. appears to be sinus/wandering 

atrial pacemaker, however, short bursts appear to be atrial flutter





Problem List


Medical Problems:


(1) Acute electrocardiogram changes


Status: Acute  





(2) Ambulatory dysfunction


Status: Acute  





(3) Anticoagulated on warfarin


Status: Chronic  





(4) CHF exacerbation


Status: Acute  





(5) Closed fibular fracture


Status: Acute  





(6) Closed fracture of neck of fibula


Status: Acute  





(7) Fever


Status: Acute  





(8) Hemoptysis


Status: Acute  





(9) Knee effusion, left


Status: Acute  





(10) Knee hemarthrosis, left


Status: Acute  





(11) Left fibular fracture


Status: Acute  





(12) Left knee pain


Status: Acute  





(13) Right hip pain


Status: Acute  





(14) SIRS (systemic inflammatory response syndrome)


Status: Acute  





(15) Supratherapeutic INR


Status: Acute  





(16) Tachycardia


Status: Acute  





(17) UTI (urinary tract infection)


Status: Acute  











Review of Systems


ENT:  + nasal symptoms


Respiratory:  + cough, + sputum, No see HPI, No wheezing, No shortness of breath

, No dyspnea on exertion, No dyspnea at rest, No hemoptysis, No problem reported


Cardiac:  + edema, No see HPI, No chest pain, No orthopnea, No PND, No 

claudication, No palpitations, No problem reported


Musculoskeletal:  + joint pain


Skin:  + problem reported





Objective


Vital Signs





Last Vital Signs Documentation








  Date Time  Temp Pulse Resp B/P (MAP) Pulse Ox O2 Delivery O2 Flow Rate FiO2


 


5/13/18 15:28 36.3 83 18 103/69 (80)  Room Air  


 


5/13/18 11:02     93  3.0 


 


5/12/18 23:54        40











Physical Exam:


General Appearance:  WD/WN, no apparent distress, + obese


Eyes:  bilateral eyes normal inspection, bilateral eyes PERRL, bilateral eyes 

EOMI


ENT:  normal ENT inspection, hearing grossly normal, pharynx normal


Neck:  supple, no adenopathy, thyroid normal, no JVD, no carotid bruits, 

trachea midline


Respiratory/Chest:  chest non-tender, no respiratory distress, no accessory 

muscle use, + decreased breath sounds (b/l bases)


Cardiovascular:  regular rate, rhythm, no JVD, no murmur, + gallop/S4


Abdomen:  normal bowel sounds, non tender, soft


Extremities:  normal inspection, no calf tenderness, + pertinent finding (2-3+ b

/l le pitting edema above the knee)


Neurologic/Psychiatric:  CNs II-XII nml as tested, no motor/sensory deficits, 

alert, normal mood/affect, oriented x 3


Skin:  normal color, warm/dry, no rash


Lymphatic:  no adenopathy





Assessment and Plan


1. sinus tachycardia


   improved with uptitration of metoprolol


   likely multifactorial with volume overload and cirrhosis


   no definitive evidence of atrial arrhythmia


   short bursts appear to be flutter, however, prolonged OR interval would 

suggest otherwise


   already on therapeutic warfarin even if she is experiencing atrial fib/

flutter to reduce risk of cardioembolic event, maintain goal INR of 2-3


   





2. volume overload


   multifactorial: diastolic dysfunction, cirrhosis and ckd


   appreciate nephrology input, will defer diuresis to their expertise, 

especially in the setting of worsening hyponatremia





cont to monitor on tele

## 2018-05-14 VITALS
HEART RATE: 98 BPM | TEMPERATURE: 97.88 F | SYSTOLIC BLOOD PRESSURE: 109 MMHG | OXYGEN SATURATION: 96 % | DIASTOLIC BLOOD PRESSURE: 72 MMHG

## 2018-05-14 VITALS
SYSTOLIC BLOOD PRESSURE: 90 MMHG | HEART RATE: 74 BPM | TEMPERATURE: 97.52 F | OXYGEN SATURATION: 97 % | DIASTOLIC BLOOD PRESSURE: 58 MMHG

## 2018-05-14 VITALS
SYSTOLIC BLOOD PRESSURE: 100 MMHG | OXYGEN SATURATION: 100 % | DIASTOLIC BLOOD PRESSURE: 54 MMHG | TEMPERATURE: 96.98 F | HEART RATE: 52 BPM

## 2018-05-14 VITALS
DIASTOLIC BLOOD PRESSURE: 70 MMHG | TEMPERATURE: 97.34 F | HEART RATE: 80 BPM | SYSTOLIC BLOOD PRESSURE: 100 MMHG | OXYGEN SATURATION: 95 %

## 2018-05-14 VITALS — HEART RATE: 68 BPM | DIASTOLIC BLOOD PRESSURE: 64 MMHG | SYSTOLIC BLOOD PRESSURE: 99 MMHG

## 2018-05-14 VITALS
OXYGEN SATURATION: 97 % | SYSTOLIC BLOOD PRESSURE: 118 MMHG | HEART RATE: 83 BPM | DIASTOLIC BLOOD PRESSURE: 78 MMHG | TEMPERATURE: 97.52 F

## 2018-05-14 VITALS
SYSTOLIC BLOOD PRESSURE: 123 MMHG | TEMPERATURE: 98.06 F | HEART RATE: 99 BPM | OXYGEN SATURATION: 95 % | DIASTOLIC BLOOD PRESSURE: 78 MMHG

## 2018-05-14 VITALS
TEMPERATURE: 97.34 F | DIASTOLIC BLOOD PRESSURE: 51 MMHG | HEART RATE: 76 BPM | SYSTOLIC BLOOD PRESSURE: 99 MMHG | OXYGEN SATURATION: 97 %

## 2018-05-14 VITALS — SYSTOLIC BLOOD PRESSURE: 89 MMHG | DIASTOLIC BLOOD PRESSURE: 48 MMHG

## 2018-05-14 LAB
BUN SERPL-MCNC: 40 MG/DL (ref 7–18)
CALCIUM SERPL-MCNC: 8.2 MG/DL (ref 8.5–10.1)
CO2 SERPL-SCNC: 29 MMOL/L (ref 21–32)
CREAT SERPL-MCNC: 2.07 MG/DL (ref 0.6–1.2)
GLUCOSE SERPL-MCNC: 77 MG/DL (ref 70–99)
INR PPP: 2.8 (ref 0.9–1.1)
POTASSIUM SERPL-SCNC: 4.9 MMOL/L (ref 3.5–5.1)
SODIUM SERPL-SCNC: 128 MMOL/L (ref 136–145)

## 2018-05-14 RX ADMIN — WARFARIN SODIUM SCH MG: 2.5 TABLET ORAL at 15:54

## 2018-05-14 RX ADMIN — METOPROLOL TARTRATE SCH MG: 25 TABLET, FILM COATED ORAL at 15:54

## 2018-05-14 RX ADMIN — FUROSEMIDE SCH MLS/MIN: 10 INJECTION, SOLUTION INTRAMUSCULAR; INTRAVENOUS at 17:11

## 2018-05-14 RX ADMIN — FLUTICASONE PROPIONATE AND SALMETEROL SCH PUFF: 50; 100 POWDER RESPIRATORY (INHALATION) at 09:32

## 2018-05-14 RX ADMIN — METOPROLOL TARTRATE SCH MG: 25 TABLET, FILM COATED ORAL at 09:33

## 2018-05-14 RX ADMIN — PANTOPRAZOLE SCH MG: 40 TABLET, DELAYED RELEASE ORAL at 09:34

## 2018-05-14 RX ADMIN — ATORVASTATIN CALCIUM SCH MG: 40 TABLET, FILM COATED ORAL at 09:34

## 2018-05-14 RX ADMIN — FLUTICASONE PROPIONATE AND SALMETEROL SCH PUFF: 50; 100 POWDER RESPIRATORY (INHALATION) at 20:57

## 2018-05-14 RX ADMIN — FUROSEMIDE SCH MLS/MIN: 10 INJECTION, SOLUTION INTRAMUSCULAR; INTRAVENOUS at 09:32

## 2018-05-14 RX ADMIN — METOPROLOL TARTRATE SCH MG: 25 TABLET, FILM COATED ORAL at 20:58

## 2018-05-14 RX ADMIN — AMIODARONE HYDROCHLORIDE SCH MG: 200 TABLET ORAL at 09:34

## 2018-05-14 NOTE — PROGRESS NOTE
Medicine Progress Note


Date & Time of Visit:


May 14, 2018 at 14:30


.


Subjective





CC: 


Follow-up visit for CHF, cirrhosis with ascites, and other problems.





HPI: 


No fever or chills.


Persistent dyspnea.  


No  cough.


No chest pain.


Persistent dependent edema and abdominal distention.


No diarrhea, melena, hematochezia.


Voiding without difficulty.


.





ROS:


as noted above in HPI


.





Objective





Last 8 Hrs








  Date Time  Temp Pulse Resp B/P (MAP) Pulse Ox O2 Delivery O2 Flow Rate FiO2


 


5/14/18 20:58  68  99/64 (76)    


 


5/14/18 19:54 36.4 83 20 118/78 (91) 97 Room Air  


 


5/14/18 18:33    94/46 (62)    





    89/48 (62)    


 


5/14/18 16:08 36.1 52 20 100/54 (69) 100 Nasal Cannula 2.0 


 


5/14/18 16:00      Room Air  








Physical Exam:





General- sitting in chair, no distress


Lungs- few basilar rales; no respiratory distress


Cardiovascular- distant heart sounds; RRR, tachy; no murmur or gallop 

appreciated; + JVD; 2-3+ pretibial edema


Abdomen- + bowel sounds, distended, soft, nontender 


Extremities- no cyanosis; no calf tenderness 


Neuro- alert, oriented


Skin- warm & dry


.


Laboratory Results:





Last 24 Hours








Test


  5/14/18


05:45


 


Prothrombin Time 29.0 SECONDS 


 


Prothromb Time International


Ratio 2.8 


 


 


Sodium Level 128 mmol/L 


 


Potassium Level 4.9 mmol/L 


 


Chloride Level 93 mmol/L 


 


Carbon Dioxide Level 29 mmol/L 


 


Anion Gap 6.0 mmol/L 


 


Blood Urea Nitrogen 40 mg/dl 


 


Creatinine 2.07 mg/dl 


 


Est Creatinine Clear Calc


Drug Dose 20.5 ml/min 


 


 


Estimated GFR (


American) 25.2 


 


 


Estimated GFR (Non-


American 21.8 


 


 


BUN/Creatinine Ratio 19.5 


 


Random Glucose 77 mg/dl 


 


Calcium Level 8.2 mg/dl 











Assessment & Plan





CHF


Acute on chronic left ventricular diastolic heart failure.


Cardiology consulted.


Fluid restriction.


Receiving IV furosemide. 


Spironolactone discontinued because of hyponatremia.


Monitor I/O's, weights.





PAF


Continue amiodarone, metoprolol, and warfarin.





HYPERTENSION


Continue metoprolol.





SLEEP APNEA


Continue CPAP.





CIRRHOSIS


US demonstrated moderate ascites.


Total bilirubin = 1.7.


Continue diuretics.





CKD III


Serum creatinine 2.01 at time of admission.


Nephrology consulted.


Creatinine today = 2.07.





HYPONATREMIA


Serum sodium 135 at time of admission.


Sodium today = 128.


Hyponatremia probably secondary to diuretic therapy.


Spironolactone discontinued 


Follow.





VTE PROPHYLAXIS


INR 2.8.  


Continue warfarin.


Ambulate.





DISPOSITION


Expected discharge to home.


Internal Medicine follow-up with Dr. Roche.


.


Current Inpatient Medications:





Current Inpatient Medications








 Medications


  (Trade)  Dose


 Ordered  Sig/Diego


 Route  Start Time


 Stop Time Status Last Admin


Dose Admin


 


 Acetaminophen


  (Tylenol Tab)  1,000 mg  Q8  PRN


 PO  5/11/18 15:45


 6/10/18 15:44   


 


 


 Amiodarone HCl


  (Cordarone Tab)  200 mg  QAM


 PO  5/12/18 09:00


 6/11/18 08:59  5/14/18 09:34


200 MG


 


 Atorvastatin


 Calcium


  (Lipitor Tab)  40 mg  QAM


 PO  5/12/18 09:00


 6/11/18 08:59  5/14/18 09:34


40 MG


 


 Diclofenac Sodium


  (Voltaren 1% Top


 Gel)  1 appln  DAILY  PRN


 EXT  5/11/18 15:45


 6/10/18 15:44   


 


 


 Ergocalciferol


  (Vitamin D Cap)  50,000


 interunit  Fr@0900


 PO  5/11/18 18:00


 6/10/18 17:59  5/11/18 17:54


50,000 INTERUNIT


 


 Salmeterol


 Xinafoate/


 Fluticasone


  (Advair Diskus


 100/50 Inh)  1 puff  BID


 INH  5/11/18 21:00


 6/10/18 20:59  5/14/18 20:57


1 PUFF


 


 Fluticasone


 Propionate


  (Flonase Nasal


 Spray)  2 sprays  DAILY  PRN


 BLADIMIR  5/11/18 15:45


 6/10/18 15:44   


 


 


 Metoprolol


 Succinate


  (Toprol Xl Tab)  12.5 mg  QAM


 PO  5/12/18 09:00


 6/11/18 08:59 Future Hold 5/12/18 08:49


12.5 MG


 


 Tramadol HCl


  (Ultram Tab)  50 mg  BID  PRN


 PO  5/11/18 15:45


 6/10/18 15:44   


 


 


 Warfarin Sodium


  (Coumadin Tab)  2.5 mg  SuMoWeThFr@1600


 PO  5/11/18 17:00


 6/10/18 16:59  5/14/18 15:54


2.5 MG


 


 Warfarin Sodium


  (Coumadin Tab)  5 mg  TuSa@1600


 PO  5/12/18 16:00


 6/11/18 15:59  5/12/18 17:04


5 MG


 


 Albuterol


  (Ventolin Hfa


 Inhaler)  2 puffs  Q4H  PRN


 INH  5/11/18 15:45


 6/10/18 15:44   


 


 


 Ranitidine HCl


  (zANTac TAB)  300 mg  HS  PRN


 PO  5/11/18 15:45


 6/10/18 15:44   


 


 


 Pantoprazole


 Sodium


  (Protonix Tab)  40 mg  QAM


 PO  5/12/18 09:00


 6/11/18 08:59  5/14/18 09:34


40 MG


 


 Metoprolol


 Tartrate


  (Lopressor Tab)  25 mg  TID


 PO  5/13/18 14:00


 6/11/18 16:59  5/14/18 15:54


25 MG


 


 Furosemide 80 mg/


 Syringe  8 ml @ 4


 mls/min  BID17


 IV  5/14/18 09:00


 6/10/18 16:59  5/14/18 17:11


4 MLS/MIN

## 2018-05-14 NOTE — CARDIOLOGY FOLLOW-UP
Subjective


Subjective


Date of Service:


May 14, 2018.


Pt evaluation today including:  conversation w/ patient, physical exam, chart 

review, lab review, review of studies, review of inpatient medication list


Additional Details:


Uneventful night.  Rhythm has been stable and appears to be a wandering atrial 

pacemaker.  Heart rates are controlled.





Problem List


Medical Problems:


(1) Acute electrocardiogram changes


Status: Acute  





(2) Ambulatory dysfunction


Status: Acute  





(3) Anticoagulated on warfarin


Status: Chronic  





(4) CHF exacerbation


Status: Acute  





(5) Closed fibular fracture


Status: Acute  





(6) Closed fracture of neck of fibula


Status: Acute  





(7) Fever


Status: Acute  





(8) Hemoptysis


Status: Acute  





(9) Knee effusion, left


Status: Acute  





(10) Knee hemarthrosis, left


Status: Acute  





(11) Left fibular fracture


Status: Acute  





(12) Left knee pain


Status: Acute  





(13) Right hip pain


Status: Acute  





(14) SIRS (systemic inflammatory response syndrome)


Status: Acute  





(15) Supratherapeutic INR


Status: Acute  





(16) Tachycardia


Status: Acute  





(17) UTI (urinary tract infection)


Status: Acute  











Review of Systems


ENT:  + nasal symptoms


Respiratory:  + cough, + sputum, No see HPI, No wheezing, No shortness of breath

, No dyspnea on exertion, No dyspnea at rest, No hemoptysis, No problem reported


Cardiac:  + edema, No see HPI, No chest pain, No orthopnea, No PND, No 

claudication, No palpitations, No problem reported


Musculoskeletal:  + joint pain


Skin:  + problem reported





Objective


Vital Signs





Last Vital Signs Documentation








  Date Time  Temp Pulse Resp B/P (MAP) Pulse Ox O2 Delivery O2 Flow Rate FiO2


 


5/14/18 07:37 36.7 99 18 123/78 (93) 95   


 


5/14/18 04:00      Nasal Cannula 2.0 


 


5/12/18 23:54        40











Physical Exam:


General Appearance:  WD/WN, no apparent distress, + obese


Eyes:  bilateral eyes normal inspection, bilateral eyes PERRL, bilateral eyes 

EOMI


ENT:  normal ENT inspection, hearing grossly normal, pharynx normal


Neck:  supple, no adenopathy, thyroid normal, no JVD, no carotid bruits, 

trachea midline


Respiratory/Chest:  chest non-tender, no respiratory distress, no accessory 

muscle use, + decreased breath sounds (b/l bases)


Cardiovascular:  regular rate, rhythm, no JVD, no murmur, + gallop/S4


Abdomen:  normal bowel sounds, non tender, soft


Extremities:  normal inspection, no calf tenderness, + pertinent finding (2-3+ b

/l le pitting edema above the knee)


Neurologic/Psychiatric:  CNs II-XII nml as tested, no motor/sensory deficits, 

alert, normal mood/affect, oriented x 3


Skin:  normal color, warm/dry, no rash


Lymphatic:  no adenopathy





Assessment and Plan


1. sinus tachycardia


   improved with uptitration of metoprolol


   likely multifactorial with volume overload and cirrhosis


   no definitive evidence of atrial arrhythmia


   short bursts appear to be flutter, however, prolonged LA interval would 

suggest otherwise


   already on therapeutic warfarin even if she is experiencing atrial fib/

flutter to reduce risk of cardioembolic event, maintain goal INR of 2-3


   





2. volume overload


   multifactorial: diastolic dysfunction, cirrhosis and ckd


   appreciate nephrology input, will defer diuresis to their expertise, 

especially in the setting of worsening hyponatremia





cont to monitor on tele, patient may be discharged per medicine.


Medications:





Current Inpatient Medications








 Medications


  (Trade)  Dose


 Ordered  Sig/Diego


 Route  Start Time


 Stop Time Status Last Admin


Dose Admin


 


 Acetaminophen


  (Tylenol Tab)  1,000 mg  Q8  PRN


 PO  5/11/18 15:45


 6/10/18 15:44   


 


 


 Amiodarone HCl


  (Cordarone Tab)  200 mg  QAM


 PO  5/12/18 09:00


 6/11/18 08:59  5/14/18 09:34


200 MG


 


 Atorvastatin


 Calcium


  (Lipitor Tab)  40 mg  QAM


 PO  5/12/18 09:00


 6/11/18 08:59  5/14/18 09:34


40 MG


 


 Diclofenac Sodium


  (Voltaren 1% Top


 Gel)  1 appln  DAILY  PRN


 EXT  5/11/18 15:45


 6/10/18 15:44   


 


 


 Ergocalciferol


  (Vitamin D Cap)  50,000


 interunit  Fr@0900


 PO  5/11/18 18:00


 6/10/18 17:59  5/11/18 17:54


50,000 INTERUNIT


 


 Salmeterol


 Xinafoate/


 Fluticasone


  (Advair Diskus


 100/50 Inh)  1 puff  BID


 INH  5/11/18 21:00


 6/10/18 20:59  5/14/18 09:32


1 PUFF


 


 Fluticasone


 Propionate


  (Flonase Nasal


 Spray)  2 sprays  DAILY  PRN


 BLADIMIR  5/11/18 15:45


 6/10/18 15:44   


 


 


 Metoprolol


 Succinate


  (Toprol Xl Tab)  12.5 mg  QAM


 PO  5/12/18 09:00


 6/11/18 08:59 Future Hold 5/12/18 08:49


12.5 MG


 


 Tramadol HCl


  (Ultram Tab)  50 mg  BID  PRN


 PO  5/11/18 15:45


 6/10/18 15:44   


 


 


 Warfarin Sodium


  (Coumadin Tab)  2.5 mg  SuMoWeThFr@1600


 PO  5/11/18 17:00


 6/10/18 16:59  5/13/18 17:40


2.5 MG


 


 Warfarin Sodium


  (Coumadin Tab)  5 mg  TuSa@1600


 PO  5/12/18 16:00


 6/11/18 15:59  5/12/18 17:04


5 MG


 


 Albuterol


  (Ventolin Hfa


 Inhaler)  2 puffs  Q4H  PRN


 INH  5/11/18 15:45


 6/10/18 15:44   


 


 


 Ranitidine HCl


  (zANTac TAB)  300 mg  HS  PRN


 PO  5/11/18 15:45


 6/10/18 15:44   


 


 


 Pantoprazole


 Sodium


  (Protonix Tab)  40 mg  QAM


 PO  5/12/18 09:00


 6/11/18 08:59  5/14/18 09:34


40 MG


 


 Metoprolol


 Tartrate


  (Lopressor Tab)  25 mg  TID


 PO  5/13/18 14:00


 6/11/18 16:59  5/14/18 09:33


25 MG


 


 Furosemide 80 mg/


 Syringe  8 ml @ 4


 mls/min  BID17


 IV  5/14/18 09:00


 6/10/18 16:59  5/14/18 09:32


4 MLS/MIN








Lab Results:





Last 24 Hours








Test


  5/14/18


05:45


 


Prothrombin Time 29.0 SECONDS 


 


Prothromb Time International


Ratio 2.8 


 


 


Sodium Level 128 mmol/L 


 


Potassium Level 4.9 mmol/L 


 


Chloride Level 93 mmol/L 


 


Carbon Dioxide Level 29 mmol/L 


 


Anion Gap 6.0 mmol/L 


 


Blood Urea Nitrogen 40 mg/dl 


 


Creatinine 2.07 mg/dl 


 


Est Creatinine Clear Calc


Drug Dose 20.5 ml/min 


 


 


Estimated GFR (


American) 25.2 


 


 


Estimated GFR (Non-


American 21.8 


 


 


BUN/Creatinine Ratio 19.5 


 


Random Glucose 77 mg/dl 


 


Calcium Level 8.2 mg/dl

## 2018-05-14 NOTE — NEPHROLOGY PROGRESS NOTE
Nephrology Progress Note


Date of Service:


May 14, 2018.


Subjective


84 yo female with ckd stage 4 and nafld with cirrhosis presented with volume 

overload.  currently on lasix and spironolactone.  pt did limit her ice chips 

and now on a fluid restriction. continues to have low sodium and continues to 

have significant edema in the lower extremities, however is quite functional





Objective











  Date Time  Temp Pulse Resp B/P (MAP) Pulse Ox O2 Delivery O2 Flow Rate FiO2


 


5/14/18 04:00      Nasal Cannula 2.0 


 


5/14/18 03:44 36.3 76 18 99/51 (67) 97  2.0 


 


5/14/18 00:09 36.4 74 18 90/58 (69) 97  97.0 


 


5/13/18 23:59      Nasal Cannula 2.0 


 


5/13/18 20:00      Nasal Cannula 2.0 


 


5/13/18 19:52 36.8 77 18 104/65 (78) 95 Nasal Cannula 2.0 


 


5/13/18 16:00      Room Air  


 


5/13/18 15:28 36.3 83 18 103/69 (80)  Room Air  


 


5/13/18 12:02      Room Air  


 


5/13/18 11:02 36.4 68 20 130/65 (86) 93 Nasal Cannula 3.0 


 


5/13/18 08:06      Room Air  








Physical Exam:


General-aaox3, obese


Eyes-no scleral icterus


ENT-mmm


Neck-supple


Lungs-decreased at bases


Heart-irregular


Abdomen-+pannus


Extremities-+3 edema


Neuro-nonfocal





Current Inpatient Medications








 Medications


  (Trade)  Dose


 Ordered  Sig/Diego


 Route  Start Time


 Stop Time Status Last Admin


Dose Admin


 


 Furosemide 40 mg/


 Syringe  4 ml @ 4


 mls/min  BID17


 IV  5/11/18 17:00


 6/10/18 16:59  5/13/18 17:40


4 MLS/MIN


 


 Acetaminophen


  (Tylenol Tab)  1,000 mg  Q8  PRN


 PO  5/11/18 15:45


 6/10/18 15:44   


 


 


 Amiodarone HCl


  (Cordarone Tab)  200 mg  QAM


 PO  5/12/18 09:00


 6/11/18 08:59  5/13/18 08:09


200 MG


 


 Atorvastatin


 Calcium


  (Lipitor Tab)  40 mg  QAM


 PO  5/12/18 09:00


 6/11/18 08:59  5/13/18 08:09


40 MG


 


 Diclofenac Sodium


  (Voltaren 1% Top


 Gel)  1 appln  DAILY  PRN


 EXT  5/11/18 15:45


 6/10/18 15:44   


 


 


 Ergocalciferol


  (Vitamin D Cap)  50,000


 interunit  Fr@0900


 PO  5/11/18 18:00


 6/10/18 17:59  5/11/18 17:54


50,000 INTERUNIT


 


 Salmeterol


 Xinafoate/


 Fluticasone


  (Advair Diskus


 100/50 Inh)  1 puff  BID


 INH  5/11/18 21:00


 6/10/18 20:59  5/13/18 20:26


1 PUFF


 


 Fluticasone


 Propionate


  (Flonase Nasal


 Spray)  2 sprays  DAILY  PRN


 BLADIMIR  5/11/18 15:45


 6/10/18 15:44   


 


 


 Metoprolol


 Succinate


  (Toprol Xl Tab)  12.5 mg  QAM


 PO  5/12/18 09:00


 6/11/18 08:59 Future Hold 5/12/18 08:49


12.5 MG


 


 Tramadol HCl


  (Ultram Tab)  50 mg  BID  PRN


 PO  5/11/18 15:45


 6/10/18 15:44   


 


 


 Warfarin Sodium


  (Coumadin Tab)  2.5 mg  SuMoWeThFr@1600


 PO  5/11/18 17:00


 6/10/18 16:59  5/13/18 17:40


2.5 MG


 


 Warfarin Sodium


  (Coumadin Tab)  5 mg  TuSa@1600


 PO  5/12/18 16:00


 6/11/18 15:59  5/12/18 17:04


5 MG


 


 Albuterol


  (Ventolin Hfa


 Inhaler)  2 puffs  Q4H  PRN


 INH  5/11/18 15:45


 6/10/18 15:44   


 


 


 Ranitidine HCl


  (zANTac TAB)  300 mg  HS  PRN


 PO  5/11/18 15:45


 6/10/18 15:44   


 


 


 Pantoprazole


 Sodium


  (Protonix Tab)  40 mg  QAM


 PO  5/12/18 09:00


 6/11/18 08:59  5/13/18 08:09


40 MG


 


 Metoprolol


 Tartrate


  (Lopressor Tab)  25 mg  TID


 PO  5/13/18 14:00


 6/11/18 16:59  5/13/18 20:27


25 MG











Last 24 Hours








Test


  5/14/18


05:45


 


Prothrombin Time 29.0 SECONDS 


 


Prothromb Time International


Ratio 2.8 


 


 


Sodium Level 128 mmol/L 


 


Potassium Level 4.9 mmol/L 


 


Chloride Level 93 mmol/L 


 


Carbon Dioxide Level 29 mmol/L 


 


Anion Gap 6.0 mmol/L 


 


Blood Urea Nitrogen 40 mg/dl 


 


Creatinine 2.07 mg/dl 


 


Est Creatinine Clear Calc


Drug Dose 20.5 ml/min 


 


 


Estimated GFR (


American) 25.2 


 


 


Estimated GFR (Non-


American 21.8 


 


 


BUN/Creatinine Ratio 19.5 


 


Random Glucose 77 mg/dl 


 


Calcium Level 8.2 mg/dl 











Assessment & Plan


ckd stage 4-creatinine stable during this hospitilization. ranging from 1.8 to 

2.    





hyponatremia-sodium levels are still slowly dropping. will stop the 

spironolactone, continue the fluid restriction and increase the diuretic dose 

of lasix to 80mg iv bid..

## 2018-05-15 VITALS — DIASTOLIC BLOOD PRESSURE: 65 MMHG | SYSTOLIC BLOOD PRESSURE: 101 MMHG

## 2018-05-15 VITALS
TEMPERATURE: 97.88 F | DIASTOLIC BLOOD PRESSURE: 60 MMHG | HEART RATE: 78 BPM | SYSTOLIC BLOOD PRESSURE: 97 MMHG | OXYGEN SATURATION: 93 %

## 2018-05-15 VITALS
DIASTOLIC BLOOD PRESSURE: 66 MMHG | HEART RATE: 70 BPM | TEMPERATURE: 97.34 F | SYSTOLIC BLOOD PRESSURE: 122 MMHG | OXYGEN SATURATION: 98 %

## 2018-05-15 VITALS
TEMPERATURE: 97.52 F | OXYGEN SATURATION: 90 % | DIASTOLIC BLOOD PRESSURE: 67 MMHG | SYSTOLIC BLOOD PRESSURE: 107 MMHG | HEART RATE: 68 BPM

## 2018-05-15 VITALS
TEMPERATURE: 97.88 F | SYSTOLIC BLOOD PRESSURE: 114 MMHG | DIASTOLIC BLOOD PRESSURE: 62 MMHG | OXYGEN SATURATION: 90 % | HEART RATE: 69 BPM

## 2018-05-15 VITALS
TEMPERATURE: 97.34 F | OXYGEN SATURATION: 99 % | DIASTOLIC BLOOD PRESSURE: 65 MMHG | HEART RATE: 75 BPM | SYSTOLIC BLOOD PRESSURE: 99 MMHG

## 2018-05-15 LAB
BUN SERPL-MCNC: 43 MG/DL (ref 7–18)
CALCIUM SERPL-MCNC: 8 MG/DL (ref 8.5–10.1)
CO2 SERPL-SCNC: 29 MMOL/L (ref 21–32)
CREAT SERPL-MCNC: 2.18 MG/DL (ref 0.6–1.2)
GLUCOSE SERPL-MCNC: 76 MG/DL (ref 70–99)
INR PPP: 3.2 (ref 0.9–1.1)
POTASSIUM SERPL-SCNC: 4.6 MMOL/L (ref 3.5–5.1)
SODIUM SERPL-SCNC: 129 MMOL/L (ref 136–145)

## 2018-05-15 RX ADMIN — FUROSEMIDE SCH MLS/MIN: 10 INJECTION, SOLUTION INTRAMUSCULAR; INTRAVENOUS at 08:24

## 2018-05-15 RX ADMIN — PANTOPRAZOLE SCH MG: 40 TABLET, DELAYED RELEASE ORAL at 08:24

## 2018-05-15 RX ADMIN — ATORVASTATIN CALCIUM SCH MG: 40 TABLET, FILM COATED ORAL at 08:24

## 2018-05-15 RX ADMIN — METOPROLOL TARTRATE SCH MG: 25 TABLET, FILM COATED ORAL at 20:40

## 2018-05-15 RX ADMIN — FLUTICASONE PROPIONATE AND SALMETEROL SCH PUFF: 50; 100 POWDER RESPIRATORY (INHALATION) at 08:23

## 2018-05-15 RX ADMIN — METOPROLOL TARTRATE SCH MG: 25 TABLET, FILM COATED ORAL at 08:24

## 2018-05-15 RX ADMIN — METOPROLOL TARTRATE SCH MG: 25 TABLET, FILM COATED ORAL at 13:36

## 2018-05-15 RX ADMIN — AMIODARONE HYDROCHLORIDE SCH MG: 200 TABLET ORAL at 08:24

## 2018-05-15 RX ADMIN — FLUTICASONE PROPIONATE AND SALMETEROL SCH PUFF: 50; 100 POWDER RESPIRATORY (INHALATION) at 20:40

## 2018-05-15 RX ADMIN — FUROSEMIDE SCH MLS/MIN: 10 INJECTION, SOLUTION INTRAMUSCULAR; INTRAVENOUS at 17:41

## 2018-05-15 NOTE — PROGRESS NOTE
Medicine Progress Note


Date & Time of Visit:


May 15, 2018 at 12:49.


Subjective


Seen resting in bed side chair, comfortable


States she feels fine overall


Denies chest pain, shortness of breath


No dizziness


Denies leg pain


Sustained a small skin tear on the left forearm while in the toilet


No significant bleeding





Objective





Last 8 Hrs








  Date Time  Temp Pulse Resp B/P (MAP) Pulse Ox O2 Delivery O2 Flow Rate FiO2


 


5/15/18 12:00      Nasal Cannula 2.0 


 


5/15/18 11:32 36.3 70 22 122/66 (84) 98 Room Air  


 


5/15/18 08:24    101/65 (77)    


 


5/15/18 08:00      Nasal Cannula 2.0 


 


5/15/18 07:04 36.3 75 26 99/65 (76) 99 Nasal Cannula 3.0 








Physical Exam:


General-oriented 3, not in distress, speaking sentences, no accessory muscle 

use





Head-  atraumatic





Eyes- PERRL, EOMI, anicteric





ENT- oropharynx clear





Neck- supple, no JVD, no adenopathy, no thyromegaly





Lungs- clear breath sounds bilaterally





Heart- regular rhythm; no murmur, normal rate





Abdomen- normal bowel sounds, soft, nontender





Extremities-grade 1-2 bilateral lower leg edema, no calf tenderness; peripheral 

pulses intact





Neuro- alert, oriented x 3; no gross focal neurologic deficits





Skin- warm & dry


Laboratory Results:





Last 24 Hours








Test


  5/15/18


05:42


 


Prothrombin Time 32.8 SECONDS 


 


Prothromb Time International


Ratio 3.2 


 


 


Sodium Level 129 mmol/L 


 


Potassium Level 4.6 mmol/L 


 


Chloride Level 93 mmol/L 


 


Carbon Dioxide Level 29 mmol/L 


 


Anion Gap 7.0 mmol/L 


 


Blood Urea Nitrogen 43 mg/dl 


 


Creatinine 2.18 mg/dl 


 


Est Creatinine Clear Calc


Drug Dose 19.6 ml/min 


 


 


Estimated GFR (


American) 23.7 


 


 


Estimated GFR (Non-


American 20.4 


 


 


BUN/Creatinine Ratio 19.8 


 


Random Glucose 76 mg/dl 


 


Calcium Level 8.0 mg/dl 











Assessment & Plan


83-year-old female with history of chronic diastolic CHF, paroxysmal A. fib, 

hypertension, liver cirrhosis presenting with shortness of breath.





CHF


Acute on chronic left ventricular diastolic heart failure.


Cardiology and nephrology  consulted


Currently still on IV Lasix


Spironolactone discontinued because of hyponatremia.





PAF


Continue amiodarone, metoprolol


INR 3.2, hold Coumadin today


INR check daily





HYPERTENSION


Continue metoprolol.





SLEEP APNEA


Continue CPAP.





CIRRHOSIS


US demonstrated moderate ascites.


Total bilirubin = 1.7.


Continue diuretics.





CKD III


Serum creatinine 2.01 at time of admission.


Nephrology consulted.


Creatinine today = 2.  1 8


Monitor while on Lasix IV





HYPONATREMIA


Likely from diuretics


Serum sodium 135 at time of admission.


Sodium today = 129


Monitor





VTE PROPHYLAXIS


INR 3.2


Usually on Coumadin





DISPOSITION


Expected discharge to home.


Internal Medicine follow-up with Dr. Roche.


.


Current Inpatient Medications:





Current Inpatient Medications








 Medications


  (Trade)  Dose


 Ordered  Sig/Diego


 Route  Start Time


 Stop Time Status Last Admin


Dose Admin


 


 Acetaminophen


  (Tylenol Tab)  1,000 mg  Q8  PRN


 PO  5/11/18 15:45


 6/10/18 15:44   


 


 


 Amiodarone HCl


  (Cordarone Tab)  200 mg  QAM


 PO  5/12/18 09:00


 6/11/18 08:59  5/15/18 08:24


200 MG


 


 Atorvastatin


 Calcium


  (Lipitor Tab)  40 mg  QAM


 PO  5/12/18 09:00


 6/11/18 08:59  5/15/18 08:24


40 MG


 


 Diclofenac Sodium


  (Voltaren 1% Top


 Gel)  1 appln  DAILY  PRN


 EXT  5/11/18 15:45


 6/10/18 15:44   


 


 


 Ergocalciferol


  (Vitamin D Cap)  50,000


 interunit  Fr@0900


 PO  5/11/18 18:00


 6/10/18 17:59  5/11/18 17:54


50,000 INTERUNIT


 


 Salmeterol


 Xinafoate/


 Fluticasone


  (Advair Diskus


 100/50 Inh)  1 puff  BID


 INH  5/11/18 21:00


 6/10/18 20:59  5/15/18 08:23


1 PUFF


 


 Fluticasone


 Propionate


  (Flonase Nasal


 Spray)  2 sprays  DAILY  PRN


 BLADIMIR  5/11/18 15:45


 6/10/18 15:44   


 


 


 Metoprolol


 Succinate


  (Toprol Xl Tab)  12.5 mg  QAM


 PO  5/12/18 09:00


 6/11/18 08:59 Future Hold 5/12/18 08:49


12.5 MG


 


 Tramadol HCl


  (Ultram Tab)  50 mg  BID  PRN


 PO  5/11/18 15:45


 6/10/18 15:44  5/15/18 00:04


50 MG


 


 Albuterol


  (Ventolin Hfa


 Inhaler)  2 puffs  Q4H  PRN


 INH  5/11/18 15:45


 6/10/18 15:44   


 


 


 Ranitidine HCl


  (zANTac TAB)  300 mg  HS  PRN


 PO  5/11/18 15:45


 6/10/18 15:44   


 


 


 Pantoprazole


 Sodium


  (Protonix Tab)  40 mg  QAM


 PO  5/12/18 09:00


 6/11/18 08:59  5/15/18 08:24


40 MG


 


 Metoprolol


 Tartrate


  (Lopressor Tab)  25 mg  TID


 PO  5/13/18 14:00


 6/11/18 16:59  5/15/18 08:24


25 MG


 


 Furosemide 80 mg/


 Syringe  8 ml @ 4


 mls/min  BID17


 IV  5/14/18 09:00


 6/10/18 16:59  5/15/18 08:24


4 MLS/MIN

## 2018-05-15 NOTE — NEPHROLOGY PROGRESS NOTE
Nephrology Progress Note


Date of Service:


May 15, 2018.


Subjective


84 yo female with ckd stage 4 and nafld with cirrhosis presented with volume 

overload.  sodium levels are slowly improving. creatinine has remained 

relatively stable despite the aggressive diuresis. pts swelling in legs is 

improving.  does have some cramping in the foot.





Objective











  Date Time  Temp Pulse Resp B/P (MAP) Pulse Ox O2 Delivery O2 Flow Rate FiO2


 


5/15/18 07:04 36.3 75 26 99/65 (76) 99 Nasal Cannula 3.0 


 


5/15/18 04:00      Nasal Cannula 2.0 


 


5/15/18 03:21 36.6 78 18 97/60 (72) 93   


 


5/14/18 23:59      Nasal Cannula 2.0 


 


5/14/18 23:56 36.3 80 18 100/70 (80) 95   


 


5/14/18 20:58  68  99/64 (76)    


 


5/14/18 20:00      Nasal Cannula 2.0 


 


5/14/18 19:54 36.4 83 20 118/78 (91) 97 Room Air  


 


5/14/18 18:33    94/46 (62)    





    89/48 (62)    


 


5/14/18 16:08 36.1 52 20 100/54 (69) 100 Nasal Cannula 2.0 


 


5/14/18 16:00      Room Air  


 


5/14/18 12:00      Nasal Cannula 2.0 


 


5/14/18 11:47 36.6 98 18 109/72 (84) 96   








Physical Exam:


General-aaox3, obese


Eyes-no scleral icterus


ENT-mmm


Neck-supple


Lungs-cta


Heart-irregular


Abdomen-+pannus


Extremities-+1 to 2 edema


Neuro-nonfocal





Current Inpatient Medications








 Medications


  (Trade)  Dose


 Ordered  Sig/Diego


 Route  Start Time


 Stop Time Status Last Admin


Dose Admin


 


 Acetaminophen


  (Tylenol Tab)  1,000 mg  Q8  PRN


 PO  5/11/18 15:45


 6/10/18 15:44   


 


 


 Amiodarone HCl


  (Cordarone Tab)  200 mg  QAM


 PO  5/12/18 09:00


 6/11/18 08:59  5/14/18 09:34


200 MG


 


 Atorvastatin


 Calcium


  (Lipitor Tab)  40 mg  QAM


 PO  5/12/18 09:00


 6/11/18 08:59  5/14/18 09:34


40 MG


 


 Diclofenac Sodium


  (Voltaren 1% Top


 Gel)  1 appln  DAILY  PRN


 EXT  5/11/18 15:45


 6/10/18 15:44   


 


 


 Ergocalciferol


  (Vitamin D Cap)  50,000


 interunit  Fr@0900


 PO  5/11/18 18:00


 6/10/18 17:59  5/11/18 17:54


50,000 INTERUNIT


 


 Salmeterol


 Xinafoate/


 Fluticasone


  (Advair Diskus


 100/50 Inh)  1 puff  BID


 INH  5/11/18 21:00


 6/10/18 20:59  5/14/18 20:57


1 PUFF


 


 Fluticasone


 Propionate


  (Flonase Nasal


 Spray)  2 sprays  DAILY  PRN


 BLADIMIR  5/11/18 15:45


 6/10/18 15:44   


 


 


 Metoprolol


 Succinate


  (Toprol Xl Tab)  12.5 mg  QAM


 PO  5/12/18 09:00


 6/11/18 08:59 Future Hold 5/12/18 08:49


12.5 MG


 


 Tramadol HCl


  (Ultram Tab)  50 mg  BID  PRN


 PO  5/11/18 15:45


 6/10/18 15:44  5/15/18 00:04


50 MG


 


 Warfarin Sodium


  (Coumadin Tab)  2.5 mg  SuMoWeThFr@1600


 PO  5/11/18 17:00


 6/10/18 16:59  5/14/18 15:54


2.5 MG


 


 Warfarin Sodium


  (Coumadin Tab)  5 mg  TuSa@1600


 PO  5/12/18 16:00


 6/11/18 15:59  5/12/18 17:04


5 MG


 


 Albuterol


  (Ventolin Hfa


 Inhaler)  2 puffs  Q4H  PRN


 INH  5/11/18 15:45


 6/10/18 15:44   


 


 


 Ranitidine HCl


  (zANTac TAB)  300 mg  HS  PRN


 PO  5/11/18 15:45


 6/10/18 15:44   


 


 


 Pantoprazole


 Sodium


  (Protonix Tab)  40 mg  QAM


 PO  5/12/18 09:00


 6/11/18 08:59  5/14/18 09:34


40 MG


 


 Metoprolol


 Tartrate


  (Lopressor Tab)  25 mg  TID


 PO  5/13/18 14:00


 6/11/18 16:59  5/14/18 15:54


25 MG


 


 Furosemide 80 mg/


 Syringe  8 ml @ 4


 mls/min  BID17


 IV  5/14/18 09:00


 6/10/18 16:59  5/14/18 17:11


4 MLS/MIN











Last 24 Hours








Test


  5/15/18


05:42


 


Prothrombin Time 32.8 SECONDS 


 


Prothromb Time International


Ratio 3.2 


 


 


Sodium Level 129 mmol/L 


 


Potassium Level 4.6 mmol/L 


 


Chloride Level 93 mmol/L 


 


Carbon Dioxide Level 29 mmol/L 


 


Anion Gap 7.0 mmol/L 


 


Blood Urea Nitrogen 43 mg/dl 


 


Creatinine 2.18 mg/dl 


 


Est Creatinine Clear Calc


Drug Dose 19.6 ml/min 


 


 


Estimated GFR (


American) 23.7 


 


 


Estimated GFR (Non-


American 20.4 


 


 


BUN/Creatinine Ratio 19.8 


 


Random Glucose 76 mg/dl 


 


Calcium Level 8.0 mg/dl 











Assessment & Plan


ckd stage 4-creatinine stable during this hospitilization. ranging from 1.8 to 

2.   with the increase in lasix, creatinine did slowly trend up and ok with the 

creatinine change since medically necessary to try to optimize volume status.  





hyponatremia-sodium levels are slowly improving. continue the current fluid 

restriction and lasix combination.

## 2018-05-16 VITALS — TEMPERATURE: 97.88 F | SYSTOLIC BLOOD PRESSURE: 97 MMHG | HEART RATE: 85 BPM | DIASTOLIC BLOOD PRESSURE: 62 MMHG

## 2018-05-16 VITALS — SYSTOLIC BLOOD PRESSURE: 108 MMHG | DIASTOLIC BLOOD PRESSURE: 65 MMHG

## 2018-05-16 VITALS
HEART RATE: 73 BPM | TEMPERATURE: 97.52 F | DIASTOLIC BLOOD PRESSURE: 62 MMHG | SYSTOLIC BLOOD PRESSURE: 88 MMHG | OXYGEN SATURATION: 95 %

## 2018-05-16 VITALS
SYSTOLIC BLOOD PRESSURE: 109 MMHG | HEART RATE: 74 BPM | DIASTOLIC BLOOD PRESSURE: 63 MMHG | TEMPERATURE: 97.34 F | OXYGEN SATURATION: 99 %

## 2018-05-16 VITALS
SYSTOLIC BLOOD PRESSURE: 103 MMHG | TEMPERATURE: 97.52 F | DIASTOLIC BLOOD PRESSURE: 68 MMHG | HEART RATE: 71 BPM | OXYGEN SATURATION: 91 %

## 2018-05-16 VITALS
DIASTOLIC BLOOD PRESSURE: 60 MMHG | SYSTOLIC BLOOD PRESSURE: 93 MMHG | HEART RATE: 77 BPM | TEMPERATURE: 97.34 F | OXYGEN SATURATION: 92 %

## 2018-05-16 VITALS — DIASTOLIC BLOOD PRESSURE: 70 MMHG | SYSTOLIC BLOOD PRESSURE: 106 MMHG | HEART RATE: 62 BPM | OXYGEN SATURATION: 93 %

## 2018-05-16 VITALS
SYSTOLIC BLOOD PRESSURE: 93 MMHG | OXYGEN SATURATION: 93 % | DIASTOLIC BLOOD PRESSURE: 57 MMHG | HEART RATE: 51 BPM | TEMPERATURE: 98.6 F

## 2018-05-16 LAB
BUN SERPL-MCNC: 45 MG/DL (ref 7–18)
CALCIUM SERPL-MCNC: 8.1 MG/DL (ref 8.5–10.1)
CO2 SERPL-SCNC: 30 MMOL/L (ref 21–32)
CREAT SERPL-MCNC: 2.1 MG/DL (ref 0.6–1.2)
GLUCOSE SERPL-MCNC: 72 MG/DL (ref 70–99)
INR PPP: 2.9 (ref 0.9–1.1)
POTASSIUM SERPL-SCNC: 4.5 MMOL/L (ref 3.5–5.1)
SODIUM SERPL-SCNC: 130 MMOL/L (ref 136–145)

## 2018-05-16 RX ADMIN — FLUTICASONE PROPIONATE AND SALMETEROL SCH PUFF: 50; 100 POWDER RESPIRATORY (INHALATION) at 08:43

## 2018-05-16 RX ADMIN — AMIODARONE HYDROCHLORIDE SCH MG: 200 TABLET ORAL at 08:43

## 2018-05-16 RX ADMIN — FUROSEMIDE SCH MLS/MIN: 10 INJECTION, SOLUTION INTRAMUSCULAR; INTRAVENOUS at 16:30

## 2018-05-16 RX ADMIN — METOPROLOL TARTRATE SCH MG: 25 TABLET, FILM COATED ORAL at 13:25

## 2018-05-16 RX ADMIN — ATORVASTATIN CALCIUM SCH MG: 40 TABLET, FILM COATED ORAL at 08:44

## 2018-05-16 RX ADMIN — PANTOPRAZOLE SCH MG: 40 TABLET, DELAYED RELEASE ORAL at 08:43

## 2018-05-16 RX ADMIN — FUROSEMIDE SCH MLS/MIN: 10 INJECTION, SOLUTION INTRAMUSCULAR; INTRAVENOUS at 09:36

## 2018-05-16 RX ADMIN — FLUTICASONE PROPIONATE AND SALMETEROL SCH PUFF: 50; 100 POWDER RESPIRATORY (INHALATION) at 20:57

## 2018-05-16 RX ADMIN — METOPROLOL TARTRATE SCH MG: 25 TABLET, FILM COATED ORAL at 20:58

## 2018-05-16 RX ADMIN — METOPROLOL TARTRATE SCH MG: 25 TABLET, FILM COATED ORAL at 08:43

## 2018-05-16 NOTE — PROGRESS NOTE
Medicine Progress Note


Date & Time of Visit:


May 16, 2018 at 13:14.


Subjective


seen resting in chair, comfortable


denies dyspnea, chest pain


still has leg swelling


no other symptoms





Objective





Last 8 Hrs








  Date Time  Temp Pulse Resp B/P (MAP) Pulse Ox O2 Delivery O2 Flow Rate FiO2


 


5/16/18 12:26 36.3 74 22 109/63 (78) 99 Room Air  


 


5/16/18 12:00      Nasal Cannula 2.0 


 


5/16/18 08:46    108/65 (79)    


 


5/16/18 08:00      Nasal Cannula 2.0 


 


5/16/18 07:08 36.6 85 18 97/62 (74)    








Physical Exam:


General-oriented 3, not in distress, speaking sentences, no accessory muscle 

use





Eyes-anicteric





ENT- oropharynx clear





Neck- supple, no JVD





Lungs- clear breath sounds bilaterally, no rales/wheezes





Heart- regular rhythm; no murmur, normal rate





Abdomen- normal bowel sounds, soft, nontender





Extremities-grade 1-2 bilateral lower leg edema, no calf tenderness; peripheral 

pulses intact





Neuro- alert, oriented x 3; no gross focal neurologic deficits





Skin- warm & dry


Laboratory Results:





Last 24 Hours








Test


  5/16/18


05:15


 


Prothrombin Time 29.8 SECONDS 


 


Prothromb Time International


Ratio 2.9 


 


 


Sodium Level 130 mmol/L 


 


Potassium Level 4.5 mmol/L 


 


Chloride Level 93 mmol/L 


 


Carbon Dioxide Level 30 mmol/L 


 


Anion Gap 6.0 mmol/L 


 


Blood Urea Nitrogen 45 mg/dl 


 


Creatinine 2.10 mg/dl 


 


Est Creatinine Clear Calc


Drug Dose 20.0 ml/min 


 


 


Estimated GFR (


American) 24.8 


 


 


Estimated GFR (Non-


American 21.4 


 


 


BUN/Creatinine Ratio 21.6 


 


Random Glucose 72 mg/dl 


 


Calcium Level 8.1 mg/dl 











Assessment & Plan


83-year-old female with history of chronic diastolic CHF, paroxysmal A. fib, 

hypertension, liver cirrhosis presenting with shortness of breath.





CHF


Acute on chronic left ventricular diastolic heart failure.


Cardiology and nephrology  consulted


continue IV Lasix


Spironolactone discontinued because of hyponatremia.





PAF


Continue amiodarone, metoprolol


INR 2.9, hold Coumadin today


INR check daily





HYPERTENSION


Continue metoprolol.





SLEEP APNEA


Continue CPAP.





CIRRHOSIS


US demonstrated moderate ascites.


Continue diuretics.





CKD III


Serum creatinine 2.01 at time of admission.


Nephrology consulted.


Creatinine today = 2.1


Monitor while on Lasix IV





HYPONATREMIA


Likely from diuretics


Serum sodium 135 at time of admission.


Sodium today = 130


Monitor





VTE PROPHYLAXIS


INR 2.9


Usually on Coumadin





DISPOSITION


Expected discharge to home.


Internal Medicine follow-up with Dr. Roche.


.


Current Inpatient Medications:





Current Inpatient Medications








 Medications


  (Trade)  Dose


 Ordered  Sig/Diego


 Route  Start Time


 Stop Time Status Last Admin


Dose Admin


 


 Acetaminophen


  (Tylenol Tab)  1,000 mg  Q8  PRN


 PO  5/11/18 15:45


 6/10/18 15:44   


 


 


 Amiodarone HCl


  (Cordarone Tab)  200 mg  QAM


 PO  5/12/18 09:00


 6/11/18 08:59  5/16/18 08:43


200 MG


 


 Atorvastatin


 Calcium


  (Lipitor Tab)  40 mg  QAM


 PO  5/12/18 09:00


 6/11/18 08:59  5/16/18 08:44


40 MG


 


 Diclofenac Sodium


  (Voltaren 1% Top


 Gel)  1 appln  DAILY  PRN


 EXT  5/11/18 15:45


 6/10/18 15:44   


 


 


 Ergocalciferol


  (Vitamin D Cap)  50,000


 interunit  Fr@0900


 PO  5/11/18 18:00


 6/10/18 17:59  5/11/18 17:54


50,000 INTERUNIT


 


 Salmeterol


 Xinafoate/


 Fluticasone


  (Advair Diskus


 100/50 Inh)  1 puff  BID


 INH  5/11/18 21:00


 6/10/18 20:59  5/16/18 08:43


1 PUFF


 


 Fluticasone


 Propionate


  (Flonase Nasal


 Spray)  2 sprays  DAILY  PRN


 BLADIMIR  5/11/18 15:45


 6/10/18 15:44   


 


 


 Metoprolol


 Succinate


  (Toprol Xl Tab)  12.5 mg  QAM


 PO  5/12/18 09:00


 6/11/18 08:59 Future Hold 5/12/18 08:49


12.5 MG


 


 Tramadol HCl


  (Ultram Tab)  50 mg  BID  PRN


 PO  5/11/18 15:45


 6/10/18 15:44  5/15/18 00:04


50 MG


 


 Albuterol


  (Ventolin Hfa


 Inhaler)  2 puffs  Q4H  PRN


 INH  5/11/18 15:45


 6/10/18 15:44  5/15/18 21:35


2 PUFFS


 


 Ranitidine HCl


  (zANTac TAB)  300 mg  HS  PRN


 PO  5/11/18 15:45


 6/10/18 15:44  5/15/18 21:08


300 MG


 


 Pantoprazole


 Sodium


  (Protonix Tab)  40 mg  QAM


 PO  5/12/18 09:00


 6/11/18 08:59  5/16/18 08:43


40 MG


 


 Metoprolol


 Tartrate


  (Lopressor Tab)  25 mg  TID


 PO  5/13/18 14:00


 6/11/18 16:59  5/16/18 08:43


25 MG


 


 Furosemide 80 mg/


 Syringe  8 ml @ 4


 mls/min  BID17


 IV  5/14/18 09:00


 6/10/18 16:59  5/16/18 09:36


4 MLS/MIN

## 2018-05-16 NOTE — NEPHROLOGY PROGRESS NOTE
Nephrology Progress Note


Date of Service:


May 16, 2018.


Subjective


82 yo female with ckd stage 4 and nafld with cirrhosis presented with volume 

overload.  sodium levels are improved.  pt walking aound well and feels her 

swelling is improving.  pt hoping to go home today.





Objective











  Date Time  Temp Pulse Resp B/P (MAP) Pulse Ox O2 Delivery O2 Flow Rate FiO2


 


5/16/18 08:46    108/65 (79)    


 


5/16/18 07:08 36.6 85 18 97/62 (74)    


 


5/16/18 04:00      CPAP  


 


5/16/18 03:53 36.4 73 18 88/62 (71) 95 Room Air  


 


5/16/18 00:08 37.0 51 18 93/57 (69) 93 Room Air  


 


5/15/18 23:59      CPAP  


 


5/15/18 20:00      Room Air  


 


5/15/18 19:02 36.4 68 18 107/67 (80) 90 Room Air  


 


5/15/18 16:00      Room Air  


 


5/15/18 15:34 36.6 69 18 114/62 (79) 90 Room Air  


 


5/15/18 12:00      Nasal Cannula 2.0 


 


5/15/18 11:32 36.3 70 22 122/66 (84) 98 Room Air  








Physical Exam:


General-aaox3, obese


Eyes-no scleral icterus


ENT-mmm


Neck-supple


Lungs-clear


Heart-irregular


Abdomen-+pannus


Extremities-+2 edema


Neuro-nonfocal





Current Inpatient Medications








 Medications


  (Trade)  Dose


 Ordered  Sig/Diego


 Route  Start Time


 Stop Time Status Last Admin


Dose Admin


 


 Acetaminophen


  (Tylenol Tab)  1,000 mg  Q8  PRN


 PO  5/11/18 15:45


 6/10/18 15:44   


 


 


 Amiodarone HCl


  (Cordarone Tab)  200 mg  QAM


 PO  5/12/18 09:00


 6/11/18 08:59  5/16/18 08:43


200 MG


 


 Atorvastatin


 Calcium


  (Lipitor Tab)  40 mg  QAM


 PO  5/12/18 09:00


 6/11/18 08:59  5/16/18 08:44


40 MG


 


 Diclofenac Sodium


  (Voltaren 1% Top


 Gel)  1 appln  DAILY  PRN


 EXT  5/11/18 15:45


 6/10/18 15:44   


 


 


 Ergocalciferol


  (Vitamin D Cap)  50,000


 interunit  Fr@0900


 PO  5/11/18 18:00


 6/10/18 17:59  5/11/18 17:54


50,000 INTERUNIT


 


 Salmeterol


 Xinafoate/


 Fluticasone


  (Advair Diskus


 100/50 Inh)  1 puff  BID


 INH  5/11/18 21:00


 6/10/18 20:59  5/16/18 08:43


1 PUFF


 


 Fluticasone


 Propionate


  (Flonase Nasal


 Spray)  2 sprays  DAILY  PRN


 BLADIMIR  5/11/18 15:45


 6/10/18 15:44   


 


 


 Metoprolol


 Succinate


  (Toprol Xl Tab)  12.5 mg  QAM


 PO  5/12/18 09:00


 6/11/18 08:59 Future Hold 5/12/18 08:49


12.5 MG


 


 Tramadol HCl


  (Ultram Tab)  50 mg  BID  PRN


 PO  5/11/18 15:45


 6/10/18 15:44  5/15/18 00:04


50 MG


 


 Albuterol


  (Ventolin Hfa


 Inhaler)  2 puffs  Q4H  PRN


 INH  5/11/18 15:45


 6/10/18 15:44  5/15/18 21:35


2 PUFFS


 


 Ranitidine HCl


  (zANTac TAB)  300 mg  HS  PRN


 PO  5/11/18 15:45


 6/10/18 15:44  5/15/18 21:08


300 MG


 


 Pantoprazole


 Sodium


  (Protonix Tab)  40 mg  QAM


 PO  5/12/18 09:00


 6/11/18 08:59  5/16/18 08:43


40 MG


 


 Metoprolol


 Tartrate


  (Lopressor Tab)  25 mg  TID


 PO  5/13/18 14:00


 6/11/18 16:59  5/16/18 08:43


25 MG


 


 Furosemide 80 mg/


 Syringe  8 ml @ 4


 mls/min  BID17


 IV  5/14/18 09:00


 6/10/18 16:59  5/15/18 17:41


4 MLS/MIN











Last 24 Hours








Test


  5/16/18


05:15


 


Prothrombin Time 29.8 SECONDS 


 


Prothromb Time International


Ratio 2.9 


 


 


Sodium Level 130 mmol/L 


 


Potassium Level 4.5 mmol/L 


 


Chloride Level 93 mmol/L 


 


Carbon Dioxide Level 30 mmol/L 


 


Anion Gap 6.0 mmol/L 


 


Blood Urea Nitrogen 45 mg/dl 


 


Creatinine 2.10 mg/dl 


 


Est Creatinine Clear Calc


Drug Dose 20.0 ml/min 


 


 


Estimated GFR (


American) 24.8 


 


 


Estimated GFR (Non-


American 21.4 


 


 


BUN/Creatinine Ratio 21.6 


 


Random Glucose 72 mg/dl 


 


Calcium Level 8.1 mg/dl 











Assessment & Plan


ckd stage 4-creatinine stable during this hospitilization. ranging from 1.8 to 

2.  on lasix 80mg iv bid.  ok from renal standpoint to go home.  would send 

home on lasix 160 po bid and spironolactone 12.5mg a day.  recheck bmp next 

monday.  may need higher dose of spironolactone as an outpt. would continue the 

relative fluid restriction of 1500cc a day.

## 2018-05-17 VITALS
OXYGEN SATURATION: 99 % | TEMPERATURE: 97.34 F | SYSTOLIC BLOOD PRESSURE: 95 MMHG | HEART RATE: 69 BPM | DIASTOLIC BLOOD PRESSURE: 75 MMHG

## 2018-05-17 VITALS
TEMPERATURE: 97.34 F | OXYGEN SATURATION: 97 % | DIASTOLIC BLOOD PRESSURE: 60 MMHG | HEART RATE: 66 BPM | SYSTOLIC BLOOD PRESSURE: 123 MMHG

## 2018-05-17 VITALS — OXYGEN SATURATION: 97 % | DIASTOLIC BLOOD PRESSURE: 60 MMHG | HEART RATE: 66 BPM | SYSTOLIC BLOOD PRESSURE: 123 MMHG

## 2018-05-17 VITALS
SYSTOLIC BLOOD PRESSURE: 81 MMHG | HEART RATE: 72 BPM | DIASTOLIC BLOOD PRESSURE: 55 MMHG | OXYGEN SATURATION: 93 % | TEMPERATURE: 98.78 F

## 2018-05-17 VITALS
SYSTOLIC BLOOD PRESSURE: 102 MMHG | TEMPERATURE: 97.52 F | HEART RATE: 77 BPM | DIASTOLIC BLOOD PRESSURE: 59 MMHG | OXYGEN SATURATION: 91 %

## 2018-05-17 VITALS — DIASTOLIC BLOOD PRESSURE: 77 MMHG | SYSTOLIC BLOOD PRESSURE: 113 MMHG

## 2018-05-17 LAB
BUN SERPL-MCNC: 49 MG/DL (ref 7–18)
CALCIUM SERPL-MCNC: 8.3 MG/DL (ref 8.5–10.1)
CO2 SERPL-SCNC: 30 MMOL/L (ref 21–32)
CREAT SERPL-MCNC: 2.15 MG/DL (ref 0.6–1.2)
GLUCOSE SERPL-MCNC: 81 MG/DL (ref 70–99)
INR PPP: 2.3 (ref 0.9–1.1)
POTASSIUM SERPL-SCNC: 4.3 MMOL/L (ref 3.5–5.1)
SODIUM SERPL-SCNC: 130 MMOL/L (ref 136–145)

## 2018-05-17 RX ADMIN — FUROSEMIDE SCH MLS/MIN: 10 INJECTION, SOLUTION INTRAMUSCULAR; INTRAVENOUS at 17:36

## 2018-05-17 RX ADMIN — AMIODARONE HYDROCHLORIDE SCH MG: 200 TABLET ORAL at 07:47

## 2018-05-17 RX ADMIN — ATORVASTATIN CALCIUM SCH MG: 40 TABLET, FILM COATED ORAL at 07:47

## 2018-05-17 RX ADMIN — FUROSEMIDE SCH MLS/MIN: 10 INJECTION, SOLUTION INTRAMUSCULAR; INTRAVENOUS at 07:48

## 2018-05-17 RX ADMIN — PANTOPRAZOLE SCH MG: 40 TABLET, DELAYED RELEASE ORAL at 07:47

## 2018-05-17 RX ADMIN — METOPROLOL TARTRATE SCH MG: 25 TABLET, FILM COATED ORAL at 07:47

## 2018-05-17 RX ADMIN — FLUTICASONE PROPIONATE AND SALMETEROL SCH PUFF: 50; 100 POWDER RESPIRATORY (INHALATION) at 07:45

## 2018-05-17 RX ADMIN — METOPROLOL TARTRATE SCH MG: 25 TABLET, FILM COATED ORAL at 13:27

## 2018-05-17 NOTE — DISCHARGE INSTRUCTIONS
Discharge Instructions


Date of Service


May 17, 2018.





Admission


Reason for Admission:  Volume Overload





Discharge


Discharge Diagnosis / Problem:  ACUTE ON CHRONIC CONGESTIVE HEART FAILURE





Discharge Goals


Goal(s):  Diagnostic testing, Therapeutic intervention





Activity Recommendations


Activity Limitations:  as noted below (NO HEAVY EXERTION UNTIL RE-EVALUATED BY  

PRIMARY CARE PHYSICIAN)


Lifting Limitations:  until after follow-up appointment


Exercise/Sports Limitations:  until after follow-up appointment


Driving or Machine Use:  NO DRIVING





.





Instructions / Follow-Up


Instructions / Follow-Up


PLEASE REVIEW YOUR NEW MEDICATION LIST AND FOLLOW INSTRUCTIONS CAREFULLY.


PLEASE MAINTAIN LOW SALT DIET (LESS THAN 2GRAMS OF SALT PER DAY) AND FLUID 

RESTRICTION (NOT MORE THAN 1500ML OF FLUIDS/DAY).





FOLLOW UP WITH DR. FLORES ON MONDAY MAY 21, 2018 AT 1:00PM.


FOLLOW UP WITH CARDIOLOGIST (HEART DOCTOR) AND NEPHROLOGIST (KIDNEY DOCTOR) IN 1

-2 WEEKS.


THE COUMADIN CLINIC WILL BE CALLING YOU FOR THE DATE OF YOUR NEXT BLOODWORK.





Call your Primary Care doctor if any of the following symptoms or problems 

start or get worse:





* Shortness of breath or difficulty breathing


* Wake up at night short of breath


* Chest pain


* Cough


* Swelling of your hands, feet, or legs


* More fatigued or tired with your normal activity


* Palpitations - sudden fast heart beats





WEIGHT





* Weigh yourself every morning after using the bathroom.


* Use the same scale.


* Wear the same amount of clothing.


* Write your weight down on a chart.


* Call your Primary Care doctor if you gain more than 2-3 pounds in 1-2 days.





MEDICATIONS





* Use this discharge instruction sheet for medication instructions.


* Take your medications at the time your doctor ordered.


* Do not skip a dose of your medicines.


* If you miss a dose of medicine, take it as soon as possible, but DO NOT 

DOUBLE A DOSE.


* Read your medicine information when you get home.


* Know all of the side effects of your medicine.  If in doubt, ask your 

pharmacist


* Call your Primary Care doctor's office if you have any side effects.


* Be sure all of your doctors know what medicine and herbs you take (including 

cold, flu, and herbal medicine).








Take the following with you to your follow-up doctor appointments:





* Weight Chart


* Medication List


* List of questions





Do not drink excessive alcohol, beer or wine.





Current Hospital Diet


Patient's current hospital diet: AHA Diet (Heart Healthy), Low Sodium Diet (2gm 

Na)





Discharge Diet


Recommended Diet:  AHA Diet (Heart Healthy), Low Sodium Diet (2gm Na)


Fluid Restriction:  1200 ml (5 cups)





Procedures


Procedures Performed:  


CHEST XRAY, ABDOMINAL US





Pending Studies


Studies pending at discharge:  yes


List of pending studies:  


REPEAT BLOODWORK C/O PRIMARY CARE PHYSICIAN AND COUMADIN CLINIC





Medical Emergencies








.


Who to Call and When:





Call 911 or go to the Emergency Room if:





* If at any time you feel your situation is an emergency


* You have tightness or pain in your chest that does not go away with rest or 

Nitroglycerin


* You are very short of breath even with rest





.





Non-Emergent Contact


Non-Emergency issues call your:  Primary Care Provider, Cardiologist (HEART 

DOCTOR), Nephrologist (KIDNEY DOCTOR)


Call Non-Emergent contact if:  you have a fever, your pain is not controlled, 

your pain is worsening, wound has increased drainage, wound has increased 

redness, wound has increased pain, you have any medication questions





.


.








"Provider Documentation" section prepared by Abhijeet Villa.








.

## 2018-05-17 NOTE — DISCHARGE SUMMARY
Discharge Summary


Date of Service


May 17, 2018.





Discharge Summary


Admission Date:


May 11, 2018 at 14:54


Discharge Date:  May 17, 2018


Principal Diagnosis:


Acute on chronic left ventricular diastolic heart failure.


Secondary Diagnoses/Problems:


Please refer to hospital course below.


Procedures:


CHEST ONE VIEW PORTABLE





CLINICAL HISTORY: EVALUATE RESPIRATORY DISTRESS.DYSPNEA dyspnea





COMPARISON STUDY:  Multiple prior exams





FINDINGS: Stable mediastinal widening with a suggestion of a potential


right-sided aortic arch. Lateral hernia. Moderate stable cardiomegaly.





Bibasilar chronic pleural reactive change. This is considered chronic. 





IMPRESSION:  Chronic change. Mild stable cardiomegaly. No acute process. 











ASCITES ABDOMEN ULTRASOUND LIMITED





CLINICAL HISTORY: evaluate ascites    





COMPARISON STUDY:  Abdominal ultrasound 3/1/2018.





FINDINGS: Real-time sonographic imaging of the abdomen was performed. Moderate


amount of ascites seen throughout the abdomen. This is slightly progressed.





IMPRESSION:  Moderate ascites.


Consultations:


Cardio- Dr. Alvarez/Dr. Márquez; Nephro Dr. Landis


Pending Studies/Follow-Up:


Repeat PRP and INR on ff up with PCP 5/21/18; Please refer to hospital course 

below for further instructions.





Medication Reconciliation


New Medications:  


Furosemide (Lasix) 80 Mg Tab


160 MG PO BID for 30 Days, #120 TAB 2 Refills





Metoprolol Tartrate (Lopressor) 25 Mg Tab


25 MG PO TID for 30 Days, #90 TAB 2 Refills





 


Changed Medications:  


Warfarin Sod (Coumadin) 5 Mg Tab


0.5 TAB PO DAILY for 30 Days ([facility]





 


Continued Medications:  


Acetaminophen (Acetaminophen) 500 Mg Tab


1000 MG PO Q8 PRN for Pain or Fever, TAB





Albuterol Sulfate (Proair Respiclick) 108 Mcg/Act Aer


2 PUFFS INH Q4H PRN for SOB/Wheezing





Amiodarone HCl (Amiodarone HCl) 200 Mg Tab


200 MG PO QAM





Atorvastatin (Lipitor) 40 Mg Tab


40 MG PO QAM





Diclofenac Sodium (Topical) (Voltaren 1% Top Gel) 1 % Gel


1 APPLN TOP DAILY PRN for n





Ergocalciferol (Vitamin D 85690 Unit) 50,000 Unit Cap


37591 UNIT PO WK


Fridays


Fluticasone Prop/Salmeterol (Advair Diskus 100/50 60 Dose) 1 Ea Aerp


1 PUFF INH BID





Fluticasone Propionate (Fluticasone Propionate) 120 Sprays/6000 Mcg Inha


2 SPRAYS BLADIMIR DAILY PRN for Allergy Symptoms





Omeprazole (Prilosec) 20 Mg Cap


20 MG PO DAILY PRN for Acid Reflux, CAP





Ranitidine (Zantac) 300 Mg Tab


300 MG PO HS PRN for Heart Burn/Acid Reflux, TAB





Spironolactone (Spironolactone) 25 Mg Tab


0.5 TAB PO DAILY





Tramadol HCl (Tramadol HCl) 50 Mg Tab


50 MG PO BID PRN for Pain





 


Discontinued Medications:  


Metoprolol Succinate (Metoprolol Succinate ER) 25 Mg Tabcr


12.5 MG PO QAM





Warfarin Sod (Coumadin) 5 Mg Tab


1 TAB PO TuSa











Admission Information


HPI (per Admitting provider):


This is an 82yo F with a PMH of CKD IV, diastolic CHF, NAFLD cirrhosis, 

paroxysmal A fib (on coumadin), HTN and other medical problems listed below who 

presents with increased BLE swelling x 1 week. Endorses an 8 pound weight gain 

in the last week as well as SOB and orthopnea. Denies increased fluid or sodium 

intake or medication non-compliance although admits that she has a hard time 

keeping track of medications. Was following up with Dr. Llamas in clinic 

today and was redirected to ED for further evaluation of volume overload. 

Follows with Dr. Alvarez in cardiology clinic and has been taking 40mg lasix BID 

and spironolactone 12.5mg daily. Recently established care with Dr. Baker 

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 Had presence of 

ascites but has not yet required paracentesis. 





Currently endorses BLE swelling and exertional SOB but denies fever, chills, 

lightheadedness, headache, visual changes, chest pain, palpitations, nausea, 

vomiting, abdominal pain, dysuria, melena or hematochezia.


Physical Exam (per Admitting):  


   General Appearance:  WD/WN, + mild distress, + pertinent finding (Tachypneic 

)


   Head:  normocephalic, atraumatic


   Eyes:  normal inspection, PERRL, sclerae normal


   ENT:  normal ENT inspection, hearing grossly normal, pharynx normal (moist 

mucous membranes )


   Neck:  supple, thyroid normal, trachea midline


   Respiratory/Chest:  chest non-tender, lungs clear, normal breath sounds, no 

respiratory distress, no accessory muscle use


   Cardiovascular:  normal peripheral pulses, + tachycardia, + pertinent finding

 (3+ BLE edema )


   Abdomen/GI:  non tender, soft, no organomegaly, + distended


   Back:  normal inspection


   Extremities/Musculoskelatal:  normal inspection, no calf tenderness, non-

tender


   Neurologic/Psych:  no motor/sensory deficits, alert, normal mood/affect, 

oriented x 3


   Skin:  normal color, warm/dry, no rash, + pertinent finding (Bruising on 

arms )





Hospital Course


83-year-old female with history of chronic diastolic CHF, paroxysmal A. fib, 

hypertension, liver cirrhosis presenting with shortness of breath.





CHF


Acute on chronic left ventricular diastolic heart failure.


Cardiology and nephrology  consulted


given IV Lasix 80mg BID,with good diuresis


Nephro recommendation:


   Lasix 160mg bid


   continue Aldactone 12.5 mg po daily


repeat PRP on ff up with PCP 5/21/18





PAROXYSMAL ATRIAL FIBRILLATION


noted to have episodes of tachycardia


Metoprolol increased to 25mg TID, HR improved


Continue amiodarone


INR supratherapeutic 3.2 at one point


decrease Coumadin to 2.5mg po daily


ff up with coumadin clinic (clinic to be notified)





HYPERTENSION


metoprolol dose increased for tachycardia





SLEEP APNEA


Continue CPAP.





CIRRHOSIS


US demonstrated moderate ascites.


Continue diuretics.





CKD III


Serum creatinine 2.01 at time of admission.


Nephrology consulted.


Creatinine= 2.1


Monitor while on increased dose of Lasix





HYPONATREMIA


Likely from diuretics


Serum sodium 135 at time of admission.


Sodium remains at 130


Monitor








DISPOSITION


Internal Medicine follow-up with Dr. Flores 5/21/18


Ff up with Cardio Dr. Alvarez and Nephro Dr. Landis in 2 weeks


.


Total time spent on discharge = 40 minutes


This includes examination of the patient, discharge planning, medication 

reconciliation, and communication with other providers.





Discharge Instructions


Discharge Instructions


Date of Service


May 17, 2018.





Admission


Reason for Admission:  Volume Overload





Discharge


Discharge Diagnosis / Problem:  ACUTE ON CHRONIC CONGESTIVE HEART FAILURE





Discharge Goals


Goal(s):  Diagnostic testing, Therapeutic intervention





Activity Recommendations


Activity Limitations:  as noted below (NO HEAVY EXERTION UNTIL RE-EVALUATED BY  

PRIMARY CARE PHYSICIAN)


Lifting Limitations:  until after follow-up appointment


Exercise/Sports Limitations:  until after follow-up appointment


Driving or Machine Use:  NO DRIVING





.





Instructions / Follow-Up


Instructions / Follow-Up


PLEASE REVIEW YOUR NEW MEDICATION LIST AND FOLLOW INSTRUCTIONS CAREFULLY.


PLEASE MAINTAIN LOW SALT DIET (LESS THAN 2GRAMS OF SALT PER DAY) AND FLUID 

RESTRICTION (NOT MORE THAN 1500ML OF FLUIDS/DAY).





FOLLOW UP WITH DR. FLORES ON MONDAY MAY 21, 2018 AT 1:00PM.


FOLLOW UP WITH CARDIOLOGIST (HEART DOCTOR) AND NEPHROLOGIST (KIDNEY DOCTOR) IN 1

-2 WEEKS.


THE COUMADIN CLINIC WILL BE CALLING YOU FOR THE DATE OF YOUR NEXT BLOODWORK.





Call your Primary Care doctor if any of the following symptoms or problems 

start or get worse:





* Shortness of breath or difficulty breathing


* Wake up at night short of breath


* Chest pain


* Cough


* Swelling of your hands, feet, or legs


* More fatigued or tired with your normal activity


* Palpitations - sudden fast heart beats





WEIGHT





* Weigh yourself every morning after using the bathroom.


* Use the same scale.


* Wear the same amount of clothing.


* Write your weight down on a chart.


* Call your Primary Care doctor if you gain more than 2-3 pounds in 1-2 days.





MEDICATIONS





* Use this discharge instruction sheet for medication instructions.


* Take your medications at the time your doctor ordered.


* Do not skip a dose of your medicines.


* If you miss a dose of medicine, take it as soon as possible, but DO NOT 

DOUBLE A DOSE.


* Read your medicine information when you get home.


* Know all of the side effects of your medicine.  If in doubt, ask your 

pharmacist


* Call your Primary Care doctor's office if you have any side effects.


* Be sure all of your doctors know what medicine and herbs you take (including 

cold, flu, and herbal medicine).








Take the following with you to your follow-up doctor appointments:





* Weight Chart


* Medication List


* List of questions





Do not drink excessive alcohol, beer or wine.





Current Hospital Diet


Patient's current hospital diet: AHA Diet (Heart Healthy), Low Sodium Diet (2gm 

Na)





Discharge Diet


Recommended Diet:  AHA Diet (Heart Healthy), Low Sodium Diet (2gm Na)


Fluid Restriction:  1200 ml (5 cups)





Procedures


Procedures Performed:  


CHEST XRAY, ABDOMINAL US





Pending Studies


Studies pending at discharge:  yes


List of pending studies:  


REPEAT BLOODWORK C/O PRIMARY CARE PHYSICIAN AND COUMADIN CLINIC





Medical Emergencies








.


Who to Call and When:





Call 911 or go to the Emergency Room if:





* If at any time you feel your situation is an emergency


* You have tightness or pain in your chest that does not go away with rest or 

Nitroglycerin


* You are very short of breath even with rest





.





Non-Emergent Contact


Non-Emergency issues call your:  Primary Care Provider, Cardiologist (HEART 

DOCTOR), Nephrologist (KIDNEY DOCTOR)


Call Non-Emergent contact if:  you have a fever, your pain is not controlled, 

your pain is worsening, wound has increased drainage, wound has increased 

redness, wound has increased pain, you have any medication questions





.


.








"Provider Documentation" section prepared by Abhijeet Villa.








.

## 2018-05-17 NOTE — NEPHROLOGY PROGRESS NOTE
Nephrology Progress Note


Date of Service:


May 17, 2018.


Subjective


84 yo female with ckd stage 4 and nafld with cirrhosis presented with volume 

overload.  sodium levels are improved.  patient is seated but states that her 

swelling is better and breathing ok. hoping to leave today. appetite good.





Objective











  Date Time  Temp Pulse Resp B/P (MAP) Pulse Ox O2 Delivery O2 Flow Rate FiO2


 


5/17/18 12:00      Room Air  


 


5/17/18 11:34  66 22 123/60 (81) 97 Room Air  


 


5/17/18 08:00      Room Air  


 


5/17/18 07:48    113/77 (89)    


 


5/17/18 07:05 36.3 69 22 95/75 (82) 99 Room Air  


 


5/17/18 04:00      CPAP 2.0 93


 


5/17/18 03:44 37.1 72 18 81/55 (64) 93 Room Air  


 


5/17/18 00:23 36.4 77 18 102/59 (73) 91 Room Air  


 


5/17/18 00:01      CPAP 2.0 91


 


5/16/18 20:00      Nasal Cannula 2.0 


 


5/16/18 20:00       2.0 


 


5/16/18 19:35 36.4 71 20 103/68 (80) 91 Room Air  


 


5/16/18 15:48  62   93   


 


5/16/18 15:43 36.3 77 20 93/60 (71) 92 Room Air  


 


5/16/18 15:39      Room Air  








Physical Exam:


General-aaox3, obese


Eyes-no scleral icterus


ENT-mmm


Neck-supple


Lungs-clear


Heart-irregular


Abdomen-+pannus


Extremities-+2 edema


Neuro-nonfocal





Current Inpatient Medications








 Medications


  (Trade)  Dose


 Ordered  Sig/Diego


 Route  Start Time


 Stop Time Status Last Admin


Dose Admin


 


 Acetaminophen


  (Tylenol Tab)  1,000 mg  Q8  PRN


 PO  5/11/18 15:45


 6/10/18 15:44   


 


 


 Amiodarone HCl


  (Cordarone Tab)  200 mg  QAM


 PO  5/12/18 09:00


 6/11/18 08:59  5/17/18 07:47


200 MG


 


 Atorvastatin


 Calcium


  (Lipitor Tab)  40 mg  QAM


 PO  5/12/18 09:00


 6/11/18 08:59  5/17/18 07:47


40 MG


 


 Diclofenac Sodium


  (Voltaren 1% Top


 Gel)  1 appln  DAILY  PRN


 EXT  5/11/18 15:45


 6/10/18 15:44   


 


 


 Ergocalciferol


  (Vitamin D Cap)  50,000


 interunit  Fr@0900


 PO  5/11/18 18:00


 6/10/18 17:59  5/11/18 17:54


50,000 INTERUNIT


 


 Salmeterol


 Xinafoate/


 Fluticasone


  (Advair Diskus


 100/50 Inh)  1 puff  BID


 INH  5/11/18 21:00


 6/10/18 20:59  5/17/18 07:45


1 PUFF


 


 Fluticasone


 Propionate


  (Flonase Nasal


 Spray)  2 sprays  DAILY  PRN


 BLADIMIR  5/11/18 15:45


 6/10/18 15:44   


 


 


 Metoprolol


 Succinate


  (Toprol Xl Tab)  12.5 mg  QAM


 PO  5/12/18 09:00


 6/11/18 08:59 Future Hold 5/12/18 08:49


12.5 MG


 


 Tramadol HCl


  (Ultram Tab)  50 mg  BID  PRN


 PO  5/11/18 15:45


 6/10/18 15:44  5/15/18 00:04


50 MG


 


 Albuterol


  (Ventolin Hfa


 Inhaler)  2 puffs  Q4H  PRN


 INH  5/11/18 15:45


 6/10/18 15:44  5/15/18 21:35


2 PUFFS


 


 Ranitidine HCl


  (zANTac TAB)  300 mg  HS  PRN


 PO  5/11/18 15:45


 6/10/18 15:44  5/15/18 21:08


300 MG


 


 Pantoprazole


 Sodium


  (Protonix Tab)  40 mg  QAM


 PO  5/12/18 09:00


 6/11/18 08:59  5/17/18 07:47


40 MG


 


 Metoprolol


 Tartrate


  (Lopressor Tab)  25 mg  TID


 PO  5/13/18 14:00


 6/11/18 16:59  5/17/18 13:27


25 MG


 


 Furosemide 80 mg/


 Syringe  8 ml @ 4


 mls/min  BID17


 IV  5/14/18 09:00


 6/10/18 16:59  5/17/18 07:48


4 MLS/MIN











Last 24 Hours








Test


  5/17/18


05:41


 


Prothrombin Time 23.8 SECONDS 


 


Prothromb Time International


Ratio 2.3 


 


 


Sodium Level 130 mmol/L 


 


Potassium Level 4.3 mmol/L 


 


Chloride Level 94 mmol/L 


 


Carbon Dioxide Level 30 mmol/L 


 


Anion Gap 6.0 mmol/L 


 


Blood Urea Nitrogen 49 mg/dl 


 


Creatinine 2.15 mg/dl 


 


Est Creatinine Clear Calc


Drug Dose 19.8 ml/min 


 


 


Estimated GFR (


American) 24.1 


 


 


Estimated GFR (Non-


American 20.8 


 


 


BUN/Creatinine Ratio 22.7 


 


Random Glucose 81 mg/dl 


 


Calcium Level 8.3 mg/dl 











Assessment & Plan


ckd stage 4-creatinine stable during this hospitalization. ranging from 1.8 to 

2.1. on lasix 80mg iv bid.  ok from renal standpoint to go home.  would send 

home on lasix 160 po bid and spironolactone 12.5mg a day.  recheck bmp within 

the week.  may need higher dose of spironolactone as an outpt. would continue 

the relative fluid restriction of 1500cc a day.





hyponatremia: remains low at 130 but stable. should improve with fluid 

restriction.





This patient was seen and treated with direct collaboration with Dr. Landis. 

Thank you for the opportunity to participate in this patient's care. Appreciate 

the Consult.





ATTENDING NOTE:


I performed a history and physical examination of the patient, including 

specifically on history-pt symptomatically improved and wants to go home on 

physical exam-lungs cta, +obesity, +1 edema, , and my impression and plan are 

ckd stage 4 with volume overload and ok from renal perspective to go home on 

oral diuretics. I have discussed the patient's management with Frances ESPINOZA, Please refer to above note for the documented findings and plan of care. 





Vik Landis DO

## 2018-05-19 ENCOUNTER — HOSPITAL ENCOUNTER (INPATIENT)
Dept: HOSPITAL 45 - C.EDB | Age: 83
LOS: 5 days | Discharge: ANOTHER HOSPITAL | DRG: 441 | End: 2018-05-24
Attending: HOSPITALIST | Admitting: HOSPITALIST
Payer: COMMERCIAL

## 2018-05-19 VITALS
BODY MASS INDEX: 41.78 KG/M2 | BODY MASS INDEX: 41.78 KG/M2 | WEIGHT: 207.23 LBS | HEIGHT: 59 IN | WEIGHT: 207.23 LBS | HEIGHT: 59 IN

## 2018-05-19 VITALS
OXYGEN SATURATION: 93 % | DIASTOLIC BLOOD PRESSURE: 54 MMHG | SYSTOLIC BLOOD PRESSURE: 103 MMHG | HEART RATE: 61 BPM | TEMPERATURE: 97.52 F

## 2018-05-19 VITALS
OXYGEN SATURATION: 95 % | DIASTOLIC BLOOD PRESSURE: 73 MMHG | TEMPERATURE: 97.7 F | HEART RATE: 60 BPM | SYSTOLIC BLOOD PRESSURE: 112 MMHG

## 2018-05-19 VITALS
DIASTOLIC BLOOD PRESSURE: 74 MMHG | SYSTOLIC BLOOD PRESSURE: 118 MMHG | TEMPERATURE: 97.88 F | OXYGEN SATURATION: 94 % | HEART RATE: 64 BPM
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
## 2018-05-19 DIAGNOSIS — N17.9: ICD-10-CM

## 2018-05-19 DIAGNOSIS — D63.1: ICD-10-CM

## 2018-05-19 DIAGNOSIS — E87.1: ICD-10-CM

## 2018-05-19 DIAGNOSIS — Z88.5: ICD-10-CM

## 2018-05-19 DIAGNOSIS — Z88.0: ICD-10-CM

## 2018-05-19 DIAGNOSIS — I48.0: ICD-10-CM

## 2018-05-19 DIAGNOSIS — S92.332A: ICD-10-CM

## 2018-05-19 DIAGNOSIS — N18.4: ICD-10-CM

## 2018-05-19 DIAGNOSIS — S92.352A: ICD-10-CM

## 2018-05-19 DIAGNOSIS — G47.33: ICD-10-CM

## 2018-05-19 DIAGNOSIS — Z88.2: ICD-10-CM

## 2018-05-19 DIAGNOSIS — I13.0: ICD-10-CM

## 2018-05-19 DIAGNOSIS — I50.810: ICD-10-CM

## 2018-05-19 DIAGNOSIS — Z88.8: ICD-10-CM

## 2018-05-19 DIAGNOSIS — K74.69: ICD-10-CM

## 2018-05-19 DIAGNOSIS — Z79.01: ICD-10-CM

## 2018-05-19 DIAGNOSIS — K76.0: ICD-10-CM

## 2018-05-19 DIAGNOSIS — I50.33: ICD-10-CM

## 2018-05-19 DIAGNOSIS — S92.322A: ICD-10-CM

## 2018-05-19 DIAGNOSIS — R18.8: ICD-10-CM
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
## 2018-05-19 DIAGNOSIS — K76.7: Primary | ICD-10-CM

## 2018-05-19 DIAGNOSIS — Z88.6: ICD-10-CM

## 2018-05-19 DIAGNOSIS — Z88.1: ICD-10-CM

## 2018-05-19 LAB
ALBUMIN SERPL-MCNC: 2.2 GM/DL (ref 3.4–5)
ALP SERPL-CCNC: 212 U/L (ref 45–117)
ALT SERPL-CCNC: 28 U/L (ref 12–78)
AST SERPL-CCNC: 71 U/L (ref 15–37)
BASOPHILS # BLD: 0.02 K/UL (ref 0–0.2)
BASOPHILS NFR BLD: 0.2 %
BUN SERPL-MCNC: 54 MG/DL (ref 7–18)
CALCIUM SERPL-MCNC: 8.5 MG/DL (ref 8.5–10.1)
CO2 SERPL-SCNC: 31 MMOL/L (ref 21–32)
CREAT SERPL-MCNC: 2.19 MG/DL (ref 0.6–1.2)
EOS ABS #: 0.03 K/UL (ref 0–0.5)
EOSINOPHIL NFR BLD AUTO: 336 K/UL (ref 130–400)
GLUCOSE SERPL-MCNC: 103 MG/DL (ref 70–99)
HCT VFR BLD CALC: 35 % (ref 37–47)
HGB BLD-MCNC: 11.9 G/DL (ref 12–16)
IG#: 0.03 K/UL (ref 0–0.02)
IMM GRANULOCYTES NFR BLD AUTO: 11.7 %
INR PPP: 2 (ref 0.9–1.1)
LYMPHOCYTES # BLD: 1.13 K/UL (ref 1.2–3.4)
MCH RBC QN AUTO: 27.2 PG (ref 25–34)
MCHC RBC AUTO-ENTMCNC: 34 G/DL (ref 32–36)
MCV RBC AUTO: 79.9 FL (ref 80–100)
MONO ABS #: 0.71 K/UL (ref 0.11–0.59)
MONOCYTES NFR BLD: 7.3 %
NEUT ABS #: 7.75 K/UL (ref 1.4–6.5)
NEUTROPHILS # BLD AUTO: 0.3 %
NEUTROPHILS NFR BLD AUTO: 80.2 %
PMV BLD AUTO: 9.9 FL (ref 7.4–10.4)
POTASSIUM SERPL-SCNC: 4.2 MMOL/L (ref 3.5–5.1)
PROT SERPL-MCNC: 6.7 GM/DL (ref 6.4–8.2)
RED CELL DISTRIBUTION WIDTH CV: 18.5 % (ref 11.5–14.5)
RED CELL DISTRIBUTION WIDTH SD: 52.5 FL (ref 36.4–46.3)
SODIUM SERPL-SCNC: 132 MMOL/L (ref 136–145)
WBC # BLD AUTO: 9.67 K/UL (ref 4.8–10.8)

## 2018-05-19 NOTE — DIAGNOSTIC IMAGING REPORT
LEFT FOOT 3 VIEWS



HISTORY:      left foot pain



COMPARISON: None.



FINDINGS: Nondisplaced Angel fracture at the base of the fifth metatarsal which

appears to be acute. There is a healing fracture at the neck of the second and

third metatarsals demonstrating surrounding callus formation. The bones are

osteopenic. The Lisfranc joint is intact. Plantar heel spur. Sclerosis within

the posterior calcaneus suggestive of a healing fracture. Diffuse soft tissue

swelling within the foot most pronounced dorsally. No radiopaque foreign bodies.



IMPRESSION:  

1. An acute nondisplaced fracture at the base of the fifth metatarsal.

2. Healing fractures at the second and third metatarsals.

3. Sclerosis within the posterior calcaneus which may represent an additional

healing/healed fracture.







Electronically signed by:  Cruz Clemens M.D.

5/19/2018 12:22 PM



Dictated Date/Time:  5/19/2018 12:20 PM

## 2018-05-19 NOTE — EMERGENCY ROOM VISIT NOTE
ED Visit Note


Care of this patient was signed out to me by Sean Cervantes PA-C at change of 

shift.  Please see his dictation for full history and physical as well as most 

of the ED course.  Case management had been working to attempt to place the 

patient in a skilled nursing facility but was having difficulty.  Apparently 

the family was willing to pay out of pocket for a short stay, however none of 

the local facilities had any beds available.  I was informed by case management 

that the patient was not able to be placed today.  They spoke with the case 

 who recommended observation.  [facility] the patient for 

admission/observation.

## 2018-05-19 NOTE — DIAGNOSTIC IMAGING REPORT
CHEST ONE VIEW PORTABLE



CLINICAL HISTORY: leg swelling    



COMPARISON STUDY:  5/11/2018



FINDINGS: The heart is mildly enlarged. There is persistent widening of the

right superior mediastinum. There is a suspected right aortic arch which may

partially explain this finding. There is mild interstitial thickening/edema.

There is blunting of the lateral costophrenic angles. There is no lobar

consolidation. There is a retrocardiac opacity consistent with a hiatal hernia[ 



IMPRESSION: 

1. Cardiomegaly and hiatal hernia

2. Superior mediastinal widening. Probable right aortic arch.

3. Small bilateral pleural effusions. Stable mild interstitial thickening/edema







Electronically signed by:  Geo Brennan M.D.

5/19/2018 8:30 PM



Dictated Date/Time:  5/19/2018 8:26 PM

## 2018-05-19 NOTE — DIAGNOSTIC IMAGING REPORT
HEAD CT NONCONTRAST



CT DOSE: 1257.71 mGy.cm



HISTORY: Head injury  head contusion



TECHNIQUE: Multiaxial CT images of the head were performed without the use of

intravenous contrast. Automated exposure control was utilized for this study.  A

dose lowering technique was utilized adhering to the principles of ALARA.



Comparison: None.



Findings: The paranasal sinuses and mastoid air cells are clear. Motion artifact

results in suboptimal evaluation. However, there is no definite hematoma,

midline shift, or acute infarct. Table 2 cm calcified meningioma the left high

convexity. The calvarium and skull base are intact. Mild basilar calcifications

at the skull base. Old lacunar infarct within the left subareolar hemisphere.

There is also a small old right MCA territory infarct, unchanged.



Impression:

No significant change compared to the prior study. No definite acute

intracranial abnormality. Motion artifact. 







Electronically signed by:  Cruz Clemens M.D.

5/19/2018 12:36 PM



Dictated Date/Time:  5/19/2018 12:32 PM

## 2018-05-19 NOTE — EMERGENCY ROOM VISIT NOTE
ED Visit Note


First contact with patient:  11:31


I have personally evaluated this patient examined her and reviewed the 

pertinent labs and data. I have discussed the case with Sean Cervantes, the 

physician assistant and agree with the plan. Please refer to the PA note.





This patient comes in after suffering mechanical fall. she hasa metatarsal 

fracture.  She is going to wear fracture shoe at home and use her walker she 

was evaluated PT here they are going to try to get in as to [facility] as an 

outpatient.  She should return if: worsening symptoms or any new problems be 

careful getting up and down.  The family can help her over the weekend.

## 2018-05-20 VITALS
SYSTOLIC BLOOD PRESSURE: 121 MMHG | OXYGEN SATURATION: 100 % | TEMPERATURE: 97.34 F | DIASTOLIC BLOOD PRESSURE: 60 MMHG | HEART RATE: 55 BPM

## 2018-05-20 VITALS
TEMPERATURE: 97.34 F | HEART RATE: 60 BPM | DIASTOLIC BLOOD PRESSURE: 54 MMHG | OXYGEN SATURATION: 95 % | SYSTOLIC BLOOD PRESSURE: 98 MMHG

## 2018-05-20 VITALS — HEART RATE: 62 BPM | SYSTOLIC BLOOD PRESSURE: 92 MMHG | DIASTOLIC BLOOD PRESSURE: 63 MMHG

## 2018-05-20 VITALS — HEART RATE: 55 BPM | DIASTOLIC BLOOD PRESSURE: 60 MMHG | SYSTOLIC BLOOD PRESSURE: 121 MMHG

## 2018-05-20 VITALS
OXYGEN SATURATION: 92 % | SYSTOLIC BLOOD PRESSURE: 99 MMHG | HEART RATE: 58 BPM | DIASTOLIC BLOOD PRESSURE: 57 MMHG | TEMPERATURE: 97.52 F

## 2018-05-20 VITALS — HEART RATE: 61 BPM | OXYGEN SATURATION: 92 %

## 2018-05-20 VITALS
HEART RATE: 70 BPM | TEMPERATURE: 97.52 F | SYSTOLIC BLOOD PRESSURE: 101 MMHG | DIASTOLIC BLOOD PRESSURE: 64 MMHG | OXYGEN SATURATION: 92 %

## 2018-05-20 VITALS
OXYGEN SATURATION: 100 % | TEMPERATURE: 97.34 F | HEART RATE: 54 BPM | SYSTOLIC BLOOD PRESSURE: 97 MMHG | DIASTOLIC BLOOD PRESSURE: 58 MMHG

## 2018-05-20 VITALS
OXYGEN SATURATION: 97 % | DIASTOLIC BLOOD PRESSURE: 59 MMHG | SYSTOLIC BLOOD PRESSURE: 92 MMHG | HEART RATE: 61 BPM | TEMPERATURE: 97.88 F

## 2018-05-20 LAB
BASOPHILS # BLD: 0.02 K/UL (ref 0–0.2)
BASOPHILS NFR BLD: 0.3 %
BUN SERPL-MCNC: 58 MG/DL (ref 7–18)
CALCIUM SERPL-MCNC: 8.4 MG/DL (ref 8.5–10.1)
CO2 SERPL-SCNC: 28 MMOL/L (ref 21–32)
CREAT SERPL-MCNC: 2.4 MG/DL (ref 0.6–1.2)
CREAT UR-MCNC: 79.7 MG/DL
EOS ABS #: 0.1 K/UL (ref 0–0.5)
EOSINOPHIL NFR BLD AUTO: 307 K/UL (ref 130–400)
GLUCOSE SERPL-MCNC: 87 MG/DL (ref 70–99)
HCT VFR BLD CALC: 32.2 % (ref 37–47)
HGB BLD-MCNC: 10.7 G/DL (ref 12–16)
IG#: 0.01 K/UL (ref 0–0.02)
IMM GRANULOCYTES NFR BLD AUTO: 16.2 %
INR PPP: 2 (ref 0.9–1.1)
LYMPHOCYTES # BLD: 1.19 K/UL (ref 1.2–3.4)
MCH RBC QN AUTO: 26.8 PG (ref 25–34)
MCHC RBC AUTO-ENTMCNC: 33.2 G/DL (ref 32–36)
MCV RBC AUTO: 80.7 FL (ref 80–100)
MONO ABS #: 0.85 K/UL (ref 0.11–0.59)
MONOCYTES NFR BLD: 11.6 %
NEUT ABS #: 5.18 K/UL (ref 1.4–6.5)
NEUTROPHILS # BLD AUTO: 1.4 %
NEUTROPHILS NFR BLD AUTO: 70.4 %
PMV BLD AUTO: 9.5 FL (ref 7.4–10.4)
POTASSIUM SERPL-SCNC: 4.1 MMOL/L (ref 3.5–5.1)
RED CELL DISTRIBUTION WIDTH CV: 18.7 % (ref 11.5–14.5)
RED CELL DISTRIBUTION WIDTH SD: 53.1 FL (ref 36.4–46.3)
SODIUM RANDOM URINE: 6 MEQ/L
SODIUM SERPL-SCNC: 133 MMOL/L (ref 136–145)
WBC # BLD AUTO: 7.35 K/UL (ref 4.8–10.8)

## 2018-05-20 RX ADMIN — AMIODARONE HYDROCHLORIDE SCH MG: 200 TABLET ORAL at 09:00

## 2018-05-20 RX ADMIN — FUROSEMIDE SCH MLS/MIN: 10 INJECTION, SOLUTION INTRAMUSCULAR; INTRAVENOUS at 14:43

## 2018-05-20 RX ADMIN — METOPROLOL TARTRATE SCH MG: 25 TABLET, FILM COATED ORAL at 21:00

## 2018-05-20 RX ADMIN — FLUTICASONE PROPIONATE AND SALMETEROL SCH PUFF: 50; 100 POWDER RESPIRATORY (INHALATION) at 08:55

## 2018-05-20 RX ADMIN — FUROSEMIDE SCH MLS/MIN: 10 INJECTION, SOLUTION INTRAMUSCULAR; INTRAVENOUS at 13:24

## 2018-05-20 RX ADMIN — METOPROLOL TARTRATE SCH MG: 25 TABLET, FILM COATED ORAL at 09:00

## 2018-05-20 RX ADMIN — WARFARIN SODIUM SCH MG: 2.5 TABLET ORAL at 17:19

## 2018-05-20 RX ADMIN — FLUTICASONE PROPIONATE AND SALMETEROL SCH PUFF: 50; 100 POWDER RESPIRATORY (INHALATION) at 21:41

## 2018-05-20 RX ADMIN — ATORVASTATIN CALCIUM SCH MG: 40 TABLET, FILM COATED ORAL at 08:55

## 2018-05-20 NOTE — NEPHROLOGY CONSULTATION
DATE OF CONSULTATION:  05/20/2018

 

REASON FOR CONSULT:  Acute renal failure on background CKD IV.

 

HISTORY OF PRESENT ILLNESS:  The patient is an 83-year-old female with

multiple medical problems including chronic diastolic heart failure with an

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

related with cirrhosis and CKD, chronic kidney disease stage 4 with a

baseline creatinine of around 1.7-1.9 but fluctuating atrial fibrillation on

Coumadin and chronic anemia.  She was recently admitted in the hospital from

05/11/2018 to 05/17/2018 for mild volume overload, decompensated heart

failure.  She was sent home on Lasix, but despite that, she noticed that her

legs are more swollen and there is fluid seeping.  She has a very detailed

bandaging done in both her legs at this time, so I did not open, but

according to the patient, she is not making a lot of urine even with the

Lasix.  She is not sure about the weight gain.  Her appetite is poor.  She is

overall very weak.  She was seen in the Emergency Department yesterday after

she tripped over in her bathroom, causing her to fall.  She was noted to have

a fracture in her metatarsal bone in the left side.  She did not have any

head trauma and she did not have syncope or lightheadedness.

 

PAST MEDICAL HISTORY:  Already detailed in the HPI.

 

PAST SURGICAL HISTORY:  Hysterectomy, cataract surgery.

 

HOME MEDICATIONS:  List was reviewed in detail.

 

ALLERGIES:  TO ASPIRIN, CODEINE, ALCOHOL, CEPHALOSPORINS, PENICILLIN AND

SULFA.

 

FAMILY HISTORY:  Hypertension.

 

PERSONAL AND SOCIAL HISTORY:  Nonsmoker, no alcohol.  Retired .  No

smoking.

 

REVIEW OF SYSTEMS:  As detailed in HPI unless stated otherwise.  Twelve

systems reviewed and negative.

 

PHYSICAL EXAMINATION:

GENERAL:  Elderly white female who is awake, alert, oriented, at this time

she appears weak.

VITAL SIGNS:  Blood pressure is 121/60, 100% room air, pulse rate 55 per

minute, temperature 36.3.

HEENT:  Mucous membrane is moist.

NECK:  Supple.  No jugular venous distention.

SKIN:  Pale and warm.

NECK:  Short and supple.

HEART:  Decreased breath sound.  Regular rate and rhythm, no murmur.

CHEST:  Decreased effort.  Decreased breath sounds at the bases.

ABDOMEN:  Obese, distended.

EXTREMITIES:  There might be some ascites, hard to tell because of obesity.

EXTREMITIES:  Shows bilateral 3+ edema all the way after the thigh and

abdominal wall.

NEUROLOGIC:  As of now, she has normal speech and moving all 4 extremities.

 

LABORATORY TESTS:  From this morning show sodium 133, potassium 4.1, BUN 58,

creatinine 2.4, calcium 8.4, hemoglobin 10.7, WBC count 7.35, platelet count

307, creatinine yesterday at the time of admission was 2.19.  Chest x-ray

done yesterday showed cardiomegaly, superior mediastinal widening, small

bilateral pleural effusion as well as mild interstitial thickening/edema.

 

ASSESSMENT AND PLAN:  An 83-year-old female with extensive medical problems

including liver cirrhosis, chronic kidney disease stage IV, and chronic

diastolic heart failure, presented to the hospital following fall and

fracture of the fifth metatarsal bone.

1.  Acute renal failure.  The acute component is quite minimal, but she is

not volume depleted.  If anything, she is clearly fluid overloaded with

weight gain, bilateral lower extremity edema with fluid seepage as well as

pleural effusion and pulmonary congestion on the chest x-ray.  She needs more

aggressive diuresis.  She did get Lasix yesterday, but does not look like she

responded much to that, so we will increase the dose by double and give it

twice a day with albumin.  Given that she has liver failure, her creatinine

will fluctuate, also check urine sodium and urinalysis to evaluate whether

this is ATN related or hepatorenal syndrome.  She has chronic hyponatremia,

but at this time, this is acceptable at 133.  Continue to do labs on a daily

basis.



Discussed with primary service.

 

 

 

JOSE

## 2018-05-20 NOTE — HISTORY & PHYSICAL EXAMINATION
DATE OF ADMISSION:  05/19/2018

 

PRIMARY CARE DOCTOR:  Dr. Roche.

 

CHIEF COMPLAINT:  Fall.

 

HISTORY OF PRESENT ILLNESS:



History obtained from patient, family, and records. 



Medical history significant for chronic diastolic heart failure, EF of

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

hyponatremia, PAFib on Coumadin, chronic anemia (baseline hemoglobin of 11),

CRI (baseline creatinine of  1.7-1.9 as per outpatient Nephrology records).  



Recent confinement 05/11/2018 to 05/17/2018, for mild volume overload, 
decompensated heart failure.

Patient sent home on Lasix medication and increased metoprolol dosing for sinus 
tachycardia during confinement.  



At home, legs more swollen, fluid seeping as per family. 

Patient denies chest pain or shortness of breath.  

Unsure about weight gain.  



Patient was seen at the ER this morning after tripping over the bathroom rug 
causing patient to fall,

Patient noted achy left foot pain. 

No head trauma.  No syncope.

Family worried about patient going home.

 

MEDICAL HISTORY:  As above.  A 2D echo from 01/2018 showed concentric LVH

enlargement, diastolic dysfunction, EF 55-59%.

 

SURGERIES:  Hysterectomy, cataract surgery.

 

HOME MEDICATIONS:  Include Tylenol, Lipitor, ProAir, amiodarone, Voltaren,

vitamin D, Advair Diskus, fluticasone, Prilosec, Zantac, spironolactone,

tramadol.

 

ALLERGIES:  ASPIRIN, CODEINE, ALCOHOL,  CEPHALOSPORIN, PENICILLIN AND

SULFA.

 

FAMILY HISTORY:  Hypertension.

 

PERSONAL AND SOCIAL HISTORY:  Nonsmoker, no chronic intake of alcoholic

beverages.  Retired .

 

REVIEW OF SYSTEMS:  As per HPI.  All 10 systems reviewed, all other ROS

negative.

 

PHYSICAL EXAMINATION:

VITAL SIGNS:  Blood pressure was noted to be 110/74, pulse rate 62, RR 18,

temperature 37, sats 94 on room air.

GENERAL:  Obese, slightly uncomfortable, no overt respiratory distress, although

occasionally has to stop talking to catch her breath.

SKIN:  Pallor, warm.

HEENT:  Pale palpebral conjunctivae.  No ptosis.  Dry mucosa.

NECK:  Short, supple.

CHEST:  Decreased effort.  No tenderness.

HEART:  Regular rate and rhythm, no murmur.

ABDOMEN:  Some distention, fluid wave.

EXTREMITIES:  Bilateral LE edema.  No LE tenderness,  no other gross

deformities.

NEUROLOGIC:  Coherent.  No facial asymmetry, no gross focality.

 

LABS:  Hemoglobin of 11.9,  white cell 10.6, platelets 286. 

Sodium noted to be 132, potassium 4, chloride 195,  BUN 64,

creatinine 2.19.  INR noted to be 2.



Chest x-ray showed cardiomegaly, hiatal hernia, superior

mediastinal widening, small bilateral pleural effusions, interstitial edema.  



CT head, no acute pathology.  



L Foot x-ray showed fracture, fifth metatarsal.  

 

ASSESSMENT:

1.  Volume overload/fluid retention.

Weight gain, bilateral leg swelling/seepage despite compliance with home 
diuretic Rx

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 
ultrasound 

ARF on CRI, some worsening kidney function from baseline.



2.  Chronic diastolic heart failure 

Patient presenting with signs of volume overload secondary to other 
comorbidities sans chest pain, SOB  symptoms 

 

3.  PAF, NSR on Coumadin.

Episodic bradycardia since increase in beta-blocker frequency adjustment from 
recent confinement

4.  HTN,  stable

5.  Chronic hyponatremia secondary to chronic liver disease.

6.  Chronic anemia 2 to CKD, hemoglobin at baseline.

7.  Left fifth metatarsal fracture secondary to mechanical fall.

  

PLAN:  

GMF

IV albumin, IV Lasix for now.  

Monitor renal function daily

Strict IOS, daily weights. 

Nephrology consult RE follow-up eval, diuretic regimen recommendations for 
volume overload. 

(Patient known to [facility]

2 g salt diet; fluid restriction of 1.5 L daily following Nephrology 
recommendations from recent confinement 

Possible benefit of therapeutic paracenteses for significant ascites from 
recent abdominal ultrasound. 

Benefit of decrease home beta blocker frequency from TID to BID dosing for now.

Orthopedics consult RE L 5th metatarsal fracture 5th

(Dr. Del Rio as per patient's family's request.)

PT/OT eval.  

DVT prophylaxis, Coumadin INR 2-3

DNR. 



Patient's daughter requesting for updates from providers.

Miss Marii Aguilar thru contact #[phone].

 

 

 

[facility]

## 2018-05-20 NOTE — ORTHOPEDIC CONSULTATION
Orthopedic Consultation


Date of Consultation:


May 20, 2018.


Attending Physician:


Abhijeet Villa MD


Reason for Consultation:


left foot fractures


History of Present Illness


Patient is an 83-year-old female who presented to the emergency room yesterday 

for evaluation of left foot.  She states that she tripped over her bathroom 

rug.  She had immediate left foot pain.  She states that she had an injury to 

her left foot also "a few days ago".  She did not seek treatment at that time.  

She states that her left foot is painful to bear weight.  She does have a boot 

on her left foot from previously injury and she states that she can get that 

from home later today.  She did not fall.  She states that she presented to the 

ED so she could potentially go to rehab facility.  She was admitted by medicine 

and we were consulted for care of her left foot fractures.





Past Medical/Surgical History


Medical Problems:


(1) Acute electrocardiogram changes


Status: Acute  





(2) Ambulatory dysfunction


Status: Acute  





(3) CHF exacerbation


Status: Acute  





(4) Closed fibular fracture


Status: Acute  





(5) Closed fracture of neck of fibula


Status: Acute  





(6) Fever


Status: Acute  





(7) Hemoptysis


Status: Acute  





(8) Knee effusion, left


Status: Acute  





(9) Knee hemarthrosis, left


Status: Acute  





(10) Left fibular fracture


Status: Acute  





(11) Left knee pain


Status: Acute  





(12) Right hip pain


Status: Acute  





(13) SIRS (systemic inflammatory response syndrome)


Status: Acute  





(14) Supratherapeutic INR


Status: Acute  





(15) UTI (urinary tract infection)


Status: Acute  











Family History





Cancer


FH: hypertension


Heart disease





Social History


Shes states that she lives with her  and daughter.  She does use a 

walker to assist with ambulation.


Smoking Status:  Never Smoker


Drug Use:  none


Marital Status:  


Housing Status:  lives with family


Occupation Status:  retired





Allergies


Coded Allergies:  


     Alcohol (Verified  Allergy, Mild, RASH, 5/19/18)


 WIPES OR INGESTED


     Cephalosporins (Verified  Allergy, Mild, RASH, 5/19/18)


     Codeine (Verified  Allergy, Mild, RASH, 5/19/18)


     Penicillins (Verified  Allergy, Mild, RASH, 5/19/18)


     Aspirin (Verified  Allergy, Unknown, RASH, 5/19/18)


     Levofloxacin (Verified  Allergy, Unknown, "BONE PAIN", 5/19/18)


     Sulfa Antibiotics (Verified  Allergy, Unknown, UNKNOWN, 5/19/18)





Home Medications


Scheduled


Amiodarone HCl (Amiodarone HCl), 200 MG PO QAM


Atorvastatin (Lipitor), 40 MG PO QAM


Ergocalciferol (Vitamin D 33618 Unit), 50,000 UNIT PO WK


Fluticasone Prop/Salmeterol (Advair Diskus 100/50 60 Dose), 1 PUFF INH BID


Furosemide (Lasix), 160 MG PO BID


Metoprolol Tartrate (Lopressor), 25 MG PO TID


Spironolactone (Spironolactone), 0.5 TAB PO DAILY


Warfarin Sod (Coumadin), 0.5 TAB PO DAILY





Scheduled PRN


Acetaminophen (Acetaminophen), 1,000 MG PO Q8 PRN for Pain or Fever


Albuterol Sulfate (Proair Respiclick), 2 PUFFS INH Q4H PRN for SOB/Wheezing


Diclofenac Sodium (Topical) (Voltaren 1% Top Gel), 1 APPLN TOP DAILY PRN for n


Fluticasone Propionate (Fluticasone Propionate), 2 SPRAYS BLADIMIR DAILY PRN for 

Allergy Symptoms


Omeprazole (Prilosec), 20 MG PO DAILY PRN for Acid Reflux


Ranitidine (Zantac), 300 MG PO HS PRN for Heart Burn/Acid Reflux


Tramadol HCl (Tramadol HCl), 50 MG PO BID PRN for Pain





Current Inpatient Medications





Current Inpatient Medications








 Medications


  (Trade)  Dose


 Ordered  Sig/Diego


 Route  Start Time


 Stop Time Status Last Admin


Dose Admin


 


 Tramadol HCl


  (Ultram Tab)  not


 relieved


 by tylenol @   Q6H  PRN


 PO  5/19/18 19:45


 6/18/18 19:44   


 


 


 Hydromorphone HCl


  (Dilaudid Inj)  0.5 mg  Q3H  PRN


 IV  5/19/18 19:45


 6/2/18 19:44   


 


 


 Miscellaneous


  (Iv Fluids


 Completed)  1 ea  PRN  PRN


 N/A  5/19/18 22:00


 5/19/19 21:59   


 


 


 Amiodarone HCl


  (Cordarone Tab)  200 mg  QAM


 PO  5/20/18 09:00


 6/19/18 08:59   


 


 


 Atorvastatin


 Calcium


  (Lipitor Tab)  40 mg  QAM


 PO  5/20/18 09:00


 6/19/18 08:59   


 


 


 Salmeterol


 Xinafoate/


 Fluticasone


  (Advair Diskus


 100/50 Inh)  1 puff  BID


 INH  5/20/18 09:00


 6/19/18 08:59   


 


 


 Fluticasone


 Propionate


  (Flonase Nasal


 Spray)  2 sprays  DAILY  PRN


 BLADIMIR  5/19/18 22:30


 6/18/18 22:29   


 


 


 Pantoprazole


 Sodium


  (Protonix Tab)  40 mg  DAILY  PRN


 PO  5/19/18 22:30


 6/18/18 22:29   


 


 


 Ranitidine HCl


  (zANTac TAB)  300 mg  HS  PRN


 PO  5/19/18 22:30


 6/18/18 22:29   


 


 


 Albuterol/


 Ipratropium


  (Duoneb)  3 ml  Q2H  PRN


 INH  5/20/18 01:00


 6/19/18 00:59   


 


 


 Metoprolol


 Tartrate


  (Lopressor Tab)  25 mg  BID


 PO  5/20/18 09:00


 6/19/18 08:59   


 


 


 Acetaminophen


  (Tylenol Tab)  325 mg  Q4H  PRN


 PO  5/20/18 07:15


 6/18/18 19:44   


 











Review of Systems


Constitutional:  No fever, No chills


Musculoskeletal:  + joint pain (left foot pain), + swelling, No calf pain





Physical Exam











  Date Time  Temp Pulse Resp B/P (MAP) Pulse Ox O2 Delivery O2 Flow Rate FiO2


 


5/20/18 07:02 36.3 54 16 97/58 (71) 100   


 


5/20/18 01:14  61 18  92 BiPAP/CPAP  


 


5/20/18 00:13      Room Air  





      CPAP  


 


5/19/18 23:53 36.4 61 18 103/54 (70) 93 CPAP  


 


5/19/18 23:23 36.5 60 18 112/73 (86) 95 CPAP  


 


5/19/18 22:00      Room Air  


 


5/19/18 21:53 36.6 64  118/74 (89) 94 Room Air  


 


5/19/18 21:34  62 18 110/74 96   


 


5/19/18 20:45      Room Air  


 


5/19/18 20:01  62 18 112/62 96 Room Air  


 


5/19/18 17:18  60 18 114/66 100 Room Air  


 


5/19/18 15:23  55 18 103/64 100 Room Air  


 


5/19/18 13:17  61 18 100/75 96 Room Air  


 


5/19/18 11:20 36.3 58 18 121/70 100 Room Air  








Extremities/Musculoskelatal:  + pertinent finding (Exam of her left foot: She 

is out of bed, sitting in a chair.  Her right foot is elevated on a stool.  Her 

left foot is currently on the floor.  She does have some edema of bilateral 

lower extremities with seeping clear fluid from edema.  Skin was warm.  Skin 

intact.  Pulses not palpable due to edema.  She tolerated gentle range of 

motion of her toes and ankle.  She is point tender to palpation over the base 

of the fifth metatarsal.  She also has some mild tenderness palpation of her 

second and third metatarsals.  Capillary refill is brisk.  She increased 

tenderness with eversion.)





Laboratory Results





Last 24 Hours








Test


  5/19/18


12:11 5/19/18


21:07 5/20/18


06:54


 


White Blood Count 9.67 K/uL   7.35 K/uL 


 


Red Blood Count 4.38 M/uL   3.99 M/uL 


 


Hemoglobin 11.9 g/dL   10.7 g/dL 


 


Hematocrit 35.0 %   32.2 % 


 


Mean Corpuscular Volume 79.9 fL   80.7 fL 


 


Mean Corpuscular Hemoglobin 27.2 pg   26.8 pg 


 


Mean Corpuscular Hemoglobin


Concent 34.0 g/dl 


  


  33.2 g/dl 


 


 


Platelet Count 336 K/uL   307 K/uL 


 


Mean Platelet Volume 9.9 fL   9.5 fL 


 


Neutrophils (%) (Auto) 80.2 %   70.4 % 


 


Lymphocytes (%) (Auto) 11.7 %   16.2 % 


 


Monocytes (%) (Auto) 7.3 %   11.6 % 


 


Eosinophils (%) (Auto) 0.3 %   1.4 % 


 


Basophils (%) (Auto) 0.2 %   0.3 % 


 


Neutrophils # (Auto) 7.75 K/uL   5.18 K/uL 


 


Lymphocytes # (Auto) 1.13 K/uL   1.19 K/uL 


 


Monocytes # (Auto) 0.71 K/uL   0.85 K/uL 


 


Eosinophils # (Auto) 0.03 K/uL   0.10 K/uL 


 


Basophils # (Auto) 0.02 K/uL   0.02 K/uL 


 


RDW Standard Deviation 52.5 fL   53.1 fL 


 


RDW Coefficient of Variation 18.5 %   18.7 % 


 


Immature Granulocyte % (Auto) 0.3 %   0.1 % 


 


Immature Granulocyte # (Auto) 0.03 K/uL   0.01 K/uL 


 


Sodium Level 132 mmol/L   133 mmol/L 


 


Potassium Level 4.2 mmol/L   4.1 mmol/L 


 


Chloride Level 95 mmol/L   96 mmol/L 


 


Carbon Dioxide Level 31 mmol/L   28 mmol/L 


 


Anion Gap 6.0 mmol/L   9.0 mmol/L 


 


Blood Urea Nitrogen 54 mg/dl   58 mg/dl 


 


Creatinine 2.19 mg/dl   2.40 mg/dl 


 


Est Creatinine Clear Calc


Drug Dose 19.7 ml/min 


  


  18.0 ml/min 


 


 


Estimated GFR (


American) 23.6 


  


  21.1 


 


 


Estimated GFR (Non-


American 20.3 


  


  18.2 


 


 


BUN/Creatinine Ratio 24.5   24.3 


 


Random Glucose 103 mg/dl   87 mg/dl 


 


Calcium Level 8.5 mg/dl   8.4 mg/dl 


 


Magnesium Level 2.3 mg/dl   


 


Total Bilirubin 1.6 mg/dl   


 


Direct Bilirubin 0.8 mg/dl   


 


Aspartate Amino Transf


(AST/SGOT) 71 U/L 


  


  


 


 


Alanine Aminotransferase


(ALT/SGPT) 28 U/L 


  


  


 


 


Alkaline Phosphatase 212 U/L   


 


Total Protein 6.7 gm/dl   


 


Albumin 2.2 gm/dl   


 


Thyroid Stimulating Hormone


(TSH) 6.230 uIu/ml 


  


  


 


 


Free Thyroxine 1.72 ng/dl   


 


Prothrombin Time  20.3 SECONDS  20.8 SECONDS 


 


Prothromb Time International


Ratio 


  2.0 


  2.0 


 


 


Total Triiodothyronine   0.50 ng/ml 











Assessment & Plan


Assessment:


1.  Acute left fifth metatarsal fracture 


2.  Subacute fractures of her second and third metatarsals.





Plan:


Recommended a fracture boot on her left lower extremity.  She states that she 

does have one at home that someone will be able to bring to her later today.  

Encouraged elevation while sitting and lying.  She needs to be nonweightbearing 

of her left lower extremity for the next couple of weeks until fracture healing 

is evident.  She can remove the boot for daily skin checks and for comfort.  

She needs to have the boot on otherwise when out of bed.  She can apply ice to 

her left foot as needed for pain.  Compression stockings as needed for edema.  

Recommend follow-up with Dr. Shaikh in approximately 2 weeks.  She should 

call [phone] schedule an appointment.  Her subacute fractures in her left 

foot are healing fine.  No further treatment is necessary for those.  We will 

continue to follow as needed during her inpatient stay.  Please call [phone] with any questions.  Patient was seen and evaluated by Dr. Bud Shaikh 

today.

## 2018-05-20 NOTE — PROGRESS NOTE
Medicine Progress Note


Date & Time of Visit:


May 20, 2018 at 12:13.


Subjective


seen resting in bed, comfortable


not in distress


states she feels ok


no chest pain, dyspnea


legs still feel swollen


denies foot pain


no other symptoms





Objective





Last 8 Hrs








  Date Time  Temp Pulse Resp B/P (MAP) Pulse Ox O2 Delivery O2 Flow Rate FiO2


 


5/20/18 08:57  55  121/60 (80)    


 


5/20/18 07:30      Room Air  


 


5/20/18 07:02 36.3 54 16 97/58 (71) 100   








Physical Exam:


General- oriented x 3, not in distress, speaks in sentences with no effort





Head- atraumatic





Eyes- anicteric





Neck- no JVD





Lungs- mild rales at the bases, no wheezing





Heart- regular rhythm; no murmur, normal rate





Abdomen- normal bowel sounds, soft, nontender, mild distention





Extremities- grade 1-2 lower leg edema, (+) dressing in place,








Neuro- alert, oriented x 3; PERRL, EOMI; no facial palsy; no dysarthria; motor 5

/5 bilaterally; no cogwheel rigidity; patellar DTRs +2/2; toes downgoing 

bilaterally; finger to nose intact bilaterally





Skin- warm & dry


Laboratory Results:





Last 24 Hours








Test


  5/19/18


21:07 5/20/18


06:54


 


Prothrombin Time 20.3 SECONDS  20.8 SECONDS 


 


Prothromb Time International


Ratio 2.0 


  2.0 


 


 


White Blood Count  7.35 K/uL 


 


Red Blood Count  3.99 M/uL 


 


Hemoglobin  10.7 g/dL 


 


Hematocrit  32.2 % 


 


Mean Corpuscular Volume  80.7 fL 


 


Mean Corpuscular Hemoglobin  26.8 pg 


 


Mean Corpuscular Hemoglobin


Concent 


  33.2 g/dl 


 


 


Platelet Count  307 K/uL 


 


Mean Platelet Volume  9.5 fL 


 


Neutrophils (%) (Auto)  70.4 % 


 


Lymphocytes (%) (Auto)  16.2 % 


 


Monocytes (%) (Auto)  11.6 % 


 


Eosinophils (%) (Auto)  1.4 % 


 


Basophils (%) (Auto)  0.3 % 


 


Neutrophils # (Auto)  5.18 K/uL 


 


Lymphocytes # (Auto)  1.19 K/uL 


 


Monocytes # (Auto)  0.85 K/uL 


 


Eosinophils # (Auto)  0.10 K/uL 


 


Basophils # (Auto)  0.02 K/uL 


 


RDW Standard Deviation  53.1 fL 


 


RDW Coefficient of Variation  18.7 % 


 


Immature Granulocyte % (Auto)  0.1 % 


 


Immature Granulocyte # (Auto)  0.01 K/uL 


 


Sodium Level  133 mmol/L 


 


Potassium Level  4.1 mmol/L 


 


Chloride Level  96 mmol/L 


 


Carbon Dioxide Level  28 mmol/L 


 


Anion Gap  9.0 mmol/L 


 


Blood Urea Nitrogen  58 mg/dl 


 


Creatinine  2.40 mg/dl 


 


Est Creatinine Clear Calc


Drug Dose 


  18.0 ml/min 


 


 


Estimated GFR (


American) 


  21.1 


 


 


Estimated GFR (Non-


American 


  18.2 


 


 


BUN/Creatinine Ratio  24.3 


 


Random Glucose  87 mg/dl 


 


Calcium Level  8.4 mg/dl 


 


Total Triiodothyronine  0.50 ng/ml 











Assessment & Plan


ACUTE ON CHRONIC DIASTOLIC CHF


HISTORY OF LIVER CIRRHOSIS


Weight gain, bilateral leg swelling/seepage despite compliance with home 

diuretic Rx


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 

ultrasound 


-- discussed with Dr. Dean


    start Lasix 80mg IV + Albumin q12h


    monitor crea


-- will observe response to IV Lasix prior to considering Paracentesis





PAF, NSR on Coumadin.


Episodic bradycardia since increase in beta-blocker frequency adjustment from 

recent confinement


-- Metoprolol reduced to 25mg BID from TID   


    monitor in Tele


-- INR 2.0


    coumadin 2.5mg po daily





LEFT FOOT METATARSAL FRACTURES


Foot Xray


1.  Acute left fifth metatarsal fracture 


2.  Subacute fractures of her second and third metatarsals.


-- Ortho consulted


   non weightbearing x 2 weeks


   ff up with Ortho





HTN,  stable





Chronic hyponatremia secondary to chronic liver disease.


- stable





Chronic anemia 2 to CKD, hemoglobin at baseline.











DVT prophylaxis, Coumadin INR 2-3





DNR. 





DISPOSITION


will likely benefit from SNF upon discharge when medicall


Current Inpatient Medications:





Current Inpatient Medications








 Medications


  (Trade)  Dose


 Ordered  Sig/Diego


 Route  Start Time


 Stop Time Status Last Admin


Dose Admin


 


 Tramadol HCl


  (Ultram Tab)  not


 relieved


 by tylenol @   Q6H  PRN


 PO  5/19/18 19:45


 6/18/18 19:44   


 


 


 Hydromorphone HCl


  (Dilaudid Inj)  0.5 mg  Q3H  PRN


 IV  5/19/18 19:45


 6/2/18 19:44   


 


 


 Miscellaneous


  (Iv Fluids


 Completed)  1 ea  PRN  PRN


 N/A  5/19/18 22:00


 5/19/19 21:59   


 


 


 Amiodarone HCl


  (Cordarone Tab)  200 mg  QAM


 PO  5/20/18 09:00


 6/19/18 08:59   


 


 


 Atorvastatin


 Calcium


  (Lipitor Tab)  40 mg  QAM


 PO  5/20/18 09:00


 6/19/18 08:59  5/20/18 08:55


40 MG


 


 Salmeterol


 Xinafoate/


 Fluticasone


  (Advair Diskus


 100/50 Inh)  1 puff  BID


 INH  5/20/18 09:00


 6/19/18 08:59  5/20/18 08:55


1 PUFF


 


 Fluticasone


 Propionate


  (Flonase Nasal


 Spray)  2 sprays  DAILY  PRN


 BLADIMIR  5/19/18 22:30


 6/18/18 22:29   


 


 


 Pantoprazole


 Sodium


  (Protonix Tab)  40 mg  DAILY  PRN


 PO  5/19/18 22:30


 6/18/18 22:29   


 


 


 Ranitidine HCl


  (zANTac TAB)  300 mg  HS  PRN


 PO  5/19/18 22:30


 6/18/18 22:29   


 


 


 Albuterol/


 Ipratropium


  (Duoneb)  3 ml  Q2H  PRN


 INH  5/20/18 01:00


 6/19/18 00:59   


 


 


 Metoprolol


 Tartrate


  (Lopressor Tab)  25 mg  BID


 PO  5/20/18 09:00


 6/19/18 08:59   


 


 


 Acetaminophen


  (Tylenol Tab)  325 mg  Q4H  PRN


 PO  5/20/18 07:15


 6/18/18 19:44   


 


 


 Miconazole Nitrate


  (Desenex Powder)  1 appln  UD  PRN


 EXT  5/20/18 10:15


 6/19/18 10:14   


 


 


 Furosemide 80 mg/


 Syringe  8 ml @ 4


 mls/min  Q12H


 IV  5/20/18 12:15


 6/19/18 12:14 UNV  


 


 


 Albumin Human


  (Albumin 25%)  12.5 gm  Q12H


 IV  5/20/18 12:15


 5/23/18 12:14 UNV

## 2018-05-21 VITALS
TEMPERATURE: 97.16 F | HEART RATE: 55 BPM | SYSTOLIC BLOOD PRESSURE: 109 MMHG | OXYGEN SATURATION: 92 % | DIASTOLIC BLOOD PRESSURE: 57 MMHG

## 2018-05-21 VITALS — DIASTOLIC BLOOD PRESSURE: 56 MMHG | SYSTOLIC BLOOD PRESSURE: 83 MMHG | HEART RATE: 56 BPM

## 2018-05-21 VITALS
DIASTOLIC BLOOD PRESSURE: 64 MMHG | TEMPERATURE: 97.7 F | SYSTOLIC BLOOD PRESSURE: 100 MMHG | HEART RATE: 55 BPM | OXYGEN SATURATION: 95 %

## 2018-05-21 VITALS
HEART RATE: 60 BPM | SYSTOLIC BLOOD PRESSURE: 96 MMHG | TEMPERATURE: 97.52 F | OXYGEN SATURATION: 96 % | DIASTOLIC BLOOD PRESSURE: 43 MMHG

## 2018-05-21 VITALS
TEMPERATURE: 98.06 F | HEART RATE: 58 BPM | SYSTOLIC BLOOD PRESSURE: 89 MMHG | OXYGEN SATURATION: 93 % | DIASTOLIC BLOOD PRESSURE: 53 MMHG

## 2018-05-21 VITALS — HEART RATE: 61 BPM | SYSTOLIC BLOOD PRESSURE: 104 MMHG | DIASTOLIC BLOOD PRESSURE: 71 MMHG

## 2018-05-21 VITALS — HEART RATE: 56 BPM | DIASTOLIC BLOOD PRESSURE: 64 MMHG | SYSTOLIC BLOOD PRESSURE: 91 MMHG

## 2018-05-21 VITALS
DIASTOLIC BLOOD PRESSURE: 58 MMHG | OXYGEN SATURATION: 93 % | SYSTOLIC BLOOD PRESSURE: 94 MMHG | TEMPERATURE: 98.6 F | HEART RATE: 60 BPM

## 2018-05-21 VITALS
HEART RATE: 59 BPM | TEMPERATURE: 97.52 F | DIASTOLIC BLOOD PRESSURE: 59 MMHG | SYSTOLIC BLOOD PRESSURE: 96 MMHG | OXYGEN SATURATION: 91 %

## 2018-05-21 VITALS — OXYGEN SATURATION: 92 %

## 2018-05-21 LAB
BUN SERPL-MCNC: 60 MG/DL (ref 7–18)
CALCIUM SERPL-MCNC: 8 MG/DL (ref 8.5–10.1)
CO2 SERPL-SCNC: 29 MMOL/L (ref 21–32)
CREAT SERPL-MCNC: 2.68 MG/DL (ref 0.6–1.2)
GLUCOSE SERPL-MCNC: 88 MG/DL (ref 70–99)
INR PPP: 2.4 (ref 0.9–1.1)
POTASSIUM SERPL-SCNC: 4 MMOL/L (ref 3.5–5.1)
SODIUM SERPL-SCNC: 133 MMOL/L (ref 136–145)

## 2018-05-21 RX ADMIN — ALBUMIN HUMAN SCH MLS/HR: 250 SOLUTION INTRAVENOUS at 14:41

## 2018-05-21 RX ADMIN — WARFARIN SODIUM SCH MG: 2.5 TABLET ORAL at 16:45

## 2018-05-21 RX ADMIN — AMIODARONE HYDROCHLORIDE SCH MG: 200 TABLET ORAL at 08:17

## 2018-05-21 RX ADMIN — METOPROLOL TARTRATE SCH MG: 25 TABLET, FILM COATED ORAL at 21:33

## 2018-05-21 RX ADMIN — FLUTICASONE PROPIONATE AND SALMETEROL SCH PUFF: 50; 100 POWDER RESPIRATORY (INHALATION) at 08:18

## 2018-05-21 RX ADMIN — METOPROLOL TARTRATE SCH MG: 25 TABLET, FILM COATED ORAL at 08:17

## 2018-05-21 RX ADMIN — ALBUMIN HUMAN SCH MLS/HR: 250 SOLUTION INTRAVENOUS at 02:23

## 2018-05-21 RX ADMIN — ATORVASTATIN CALCIUM SCH MG: 40 TABLET, FILM COATED ORAL at 08:17

## 2018-05-21 RX ADMIN — ALBUMIN HUMAN SCH GM: 250 SOLUTION INTRAVENOUS at 23:14

## 2018-05-21 RX ADMIN — FLUTICASONE PROPIONATE AND SALMETEROL SCH PUFF: 50; 100 POWDER RESPIRATORY (INHALATION) at 21:29

## 2018-05-21 RX ADMIN — ALBUMIN HUMAN SCH GM: 250 SOLUTION INTRAVENOUS at 19:04

## 2018-05-21 NOTE — DIAGNOSTIC IMAGING REPORT
ASCITES-ABDOMEN LIMITED ULTRASOUND



CLINICAL HISTORY: check for ascites    



COMPARISON STUDY:  Abdominal ultrasound 5/11/2018.



FINDINGS: No change in the moderate ascites seen throughout the abdomen. Nodular

contour to the liver consistent with cirrhosis.



IMPRESSION:  Moderate ascites, unchanged. 









Electronically signed by:  Cruz Clemens M.D.

5/21/2018 3:19 PM



Dictated Date/Time:  5/21/2018 3:18 PM

## 2018-05-21 NOTE — DIAGNOSTIC IMAGING REPORT
L HIP UNILATERAL 2 VIEWS, L FEMUR 2 VIEWS ROUTINE, L KNEE 1 OR 2 VIEWS ROUTINE,

L TIBIA/FIBULA 2 VIEWS ROUTINE, L ANKLE MIN 3 VIEWS ROUTINE



CLINICAL HISTORY: Fall. Left lower extremity pain.   



COMPARISON STUDY:  Left foot 5/19/2018.



FINDINGS: The bones are osteopenic. Mild osteoarthritis within the left hip.

Visualized pelvic bones are intact. No significant knee effusion. Severe

osteoarthritis within the medial compartment of the left knee with bone-on-bone

articulation. There are tricompartmental marginal osteophytes within the left

knee. There is also severe osteoarthritis with bone-on-bone articulation of the

patellofemoral joint. Diffuse subcutaneous edema throughout the left lower

extremity. Plantar heel spur. Fracture at the base of the fifth metatarsal is

again noted. Healing fractures at the distal second and third metatarsals are

also identified. No acute fractures seen within the left hip, left femur, left

knee, left lower leg, left ankle.



IMPRESSION:  

1. No acute fracture or dislocation within the left hip, left femur, left knee,

left lower leg, or left ankle.

2. Left fifth metatarsal fracture is better appreciated on the recent left foot

radiograph.

3. Diffuse soft tissue edema throughout the left lower extremity. 









Electronically signed by:  Cruz Clemens M.D.

5/21/2018 4:01 PM



Dictated Date/Time:  5/21/2018 3:47 PM

## 2018-05-21 NOTE — GASTROINTESTINAL CONSULTATION
Gastrointestinal Consultation


Date of Consultation:  May 21, 2018


Attending Physician:  Abhijeet Villa


Consulting Physician:  Oumar Horn


Reason for Consultation:  Cirrhosis, volume overload


History of Present Illness


Patient is a 83 year old female w PMHx of diastolic HF, EF 55-59%, Afib on 

Coumadin, chronic anemia, CKD, NAFLD cirrhosis who presented to ED after she 

tripped over bathroom rug resulting in fall and fracture of L metatarsal. She 

was last admitted here on 5/11 to 5/17 for volume overload, decompensated HF. 

She had been managed by Dr. Baker from our GI clinic, last seen early 

April. Was on Lasix 40mg BID, Spironolactone 25mg daily. Last weight recorded 

from our clinic was 195lbs currently 202lbs. She denies any increased SOB, abd 

distension. + leg edema but may be at her baseline as she doesn't note much 

difference in the swelling. Her labs showed increased Cr of 2.6 (baseline 1.6), 

Jose Manuel 6, Na 133. LFTs mildly elevated but at baseline.





Past Medical/Surgical History


Medical Problems:


(1) Acute electrocardiogram changes


Status: Acute  





(2) Ambulatory dysfunction


Status: Acute  





(3) CHF exacerbation


Status: Acute  





(4) Closed fibular fracture


Status: Acute  





(5) Closed fracture of neck of fibula


Status: Acute  





(6) Fever


Status: Acute  





(7) Hemoptysis


Status: Acute  





(8) Knee effusion, left


Status: Acute  





(9) Knee hemarthrosis, left


Status: Acute  





(10) Left fibular fracture


Status: Acute  





(11) Left knee pain


Status: Acute  





(12) Right hip pain


Status: Acute  





(13) SIRS (systemic inflammatory response syndrome)


Status: Acute  





(14) Supratherapeutic INR


Status: Acute  





(15) UTI (urinary tract infection)


Status: Acute  








Past Medical History:


As above


Past Surgical History:


Hysterectomy, cataract surgery





Family History





Cancer


FH: hypertension


Heart disease





Social History


Smoking Status:  Never Smoker


Alcohol Use:  none


Drug Use:  none


Marital Status:  


Housing Status:  lives with family


Occupation Status:  retired





Allergies


Coded Allergies:  


     Alcohol (Verified  Allergy, Mild, RASH, 5/19/18)


 WIPES OR INGESTED


     Cephalosporins (Verified  Allergy, Mild, RASH, 5/19/18)


     Codeine (Verified  Allergy, Mild, RASH, 5/19/18)


     Penicillins (Verified  Allergy, Mild, RASH, 5/19/18)


     Aspirin (Verified  Allergy, Unknown, RASH, 5/19/18)


     Levofloxacin (Verified  Allergy, Unknown, "BONE PAIN", 5/19/18)


     Sulfa Antibiotics (Verified  Allergy, Unknown, UNKNOWN, 5/19/18)





Current Medications





Home Meds and Scripts








 Medications  Dose


 Route/Sig


 Max Daily Dose Days Date Category Dose


Instructions


 


 Lasix


  (Furosemide) 80


 Mg Tab  160 Mg


 PO BID


   30 5/17/18 Rx 


 


 Lopressor


  (Metoprolol


 Tartrate) 25 Mg


 Tab  25 Mg


 PO TID


   30 5/17/18 Rx 


 


 Coumadin


  (Warfarin Sod) 5


 Mg Tab  0.5 Tab


 PO DAILY


   30 5/17/18 Rx 


 


 Spironolactone 25


 Mg Tab  0.5 Tab


 PO DAILY


    5/11/18 Reported 


 


 Voltaren 1% Top


 Gel (Diclofenac


 Sodium (Topical))


 1 % Gel  1 Appln


 TOP DAILY PRN


    5/11/18 Reported 


 


 Vitamin D 98974


 Unit


  (Ergocalciferol)


 50,000 Unit Cap  50,000 Unit


 PO WK


    3/20/18 Reported  Fridays


 


 Fluticasone


 Propionate 120


 Sprays/6000 Mcg


 Inha  2 Sprays


 BLADIMIR DAILY PRN


    1/28/18 Reported 


 


 Tramadol HCl 50


 Mg Tab  50 Mg


 PO BID PRN


    1/28/18 Reported 


 


 Lipitor


  (Atorvastatin


 Calcium) 40 Mg Tab  40 Mg


 PO QAM


    1/2/18 Reported 


 


 Acetaminophen 500


 Mg Tab  1,000 Mg


 PO Q8 PRN


    1/2/18 Reported 


 


 Proair Respiclick


  (Albuterol


 Sulfate) 108


 Mcg/Act Aer  2 Puffs


 INH Q4H PRN


    8/13/17 Reported 


 


 Advair Diskus


 100/50 60 Dose


  (Fluticasone


 Prop/Salmeterol)


 1 Ea Aerp  1 Puff


 INH BID


    8/12/17 Reported 


 


 Amiodarone HCl


 200 Mg Tab  200 Mg


 PO QAM


    6/21/16 Reported 


 


 Prilosec


  (Omeprazole) 20


 Mg Cap  20 Mg


 PO DAILY PRN


    11/9/15 Reported 


 


 Zantac


  (Ranitidine HCl)


 300 Mg Tab  300 Mg


 PO HS PRN


    6/11/15 Reported 











Review of Systems


Constitutional:  No fever, No chills


Respiratory:  No cough, No shortness of breath


Cardiac:  No chest pain


Abdomen:  No pain, No nausea, No vomiting, No GI bleeding


Musculoskeletal:  + see HPI (L metatarsal fracture)


Skin:  No rash, No itch, No jaundice





Physical Exam











  Date Time  Temp Pulse Resp B/P (MAP) Pulse Ox O2 Delivery O2 Flow Rate FiO2


 


5/21/18 11:20      Room Air  


 


5/21/18 11:00 36.5 55 18 100/64 (76) 95 Room Air  


 


5/21/18 07:30      Room Air  


 


5/21/18 07:00 36.7 58 24 89/53 (65) 93 Room Air  


 


5/21/18 03:11 37.0 60 22 94/58 (70) 93 Room Air  


 


5/21/18 00:21     92 Room Air 1.5 


 


5/20/18 23:08 36.3 60 20 98/54 (69) 95 Room Air  


 


5/20/18 21:41  62  92/63 (73)    


 


5/20/18 19:15 36.4 58 16 99/57 (71) 92 Room Air  


 


5/20/18 16:00      Nasal Cannula  


 


5/20/18 15:14 36.6 61 15 92/59 (70) 97 Nasal Cannula 1.5 








General Appearance:  WD/WN, no apparent distress


Eyes:  normal inspection, PERRL, EOMI


Neck:  supple, no JVD, trachea midline


Respiratory/Chest:  normal breath sounds, + crackles (RLL)


Cardiovascular:  regular rate, rhythm, no gallop, no murmur


Abdomen:  normal bowel sounds, non tender, soft


Extremities:  + swelling (+1 pitting edema on bilateral LLE. Both legs wrapped. 

)


Neurologic/Psych:  alert, normal mood/affect, oriented x 3


Skin:  normal color, no jaundice, warm/dry





Laboratory Results





Last 24 Hours








Test


  5/20/18


19:38 5/21/18


05:11


 


Urine Color DK YELLOW  


 


Urine Appearance CLOUDY  


 


Urine pH 5.0  


 


Urine Specific Gravity 1.016  


 


Urine Protein 1+  


 


Urine Glucose (UA) NEG  


 


Urine Ketones NEG  


 


Urine Occult Blood 2+  


 


Urine Nitrite NEG  


 


Urine Bilirubin NEG  


 


Urine Urobilinogen NEG  


 


Urine Leukocyte Esterase TRACE  


 


Urine WBC (Auto) 1-5 /hpf  


 


Urine RBC (Auto) 0-4 /hpf  


 


Urine Hyaline Casts (Auto) >30 /lpf  


 


Urine Epithelial Cells (Auto) >30 /lpf  


 


Urine Bacteria (Auto) NEG  


 


Urine Renal Epithelial Cells 10-20 /lpf  


 


Urine Pathogenic Casts  /lpf  


 


Urine Random Creatinine 79.7 mg/dl  


 


Urine Random Sodium 6 mEq/L  


 


Prothrombin Time  24.9 SECONDS 


 


Prothromb Time International


Ratio 


  2.4 


 


 


Sodium Level  133 mmol/L 


 


Potassium Level  4.0 mmol/L 


 


Chloride Level  96 mmol/L 


 


Carbon Dioxide Level  29 mmol/L 


 


Anion Gap  8.0 mmol/L 


 


Blood Urea Nitrogen  60 mg/dl 


 


Creatinine  2.68 mg/dl 


 


Est Creatinine Clear Calc


Drug Dose 


  16.0 ml/min 


 


 


Estimated GFR (


American) 


  18.5 


 


 


Estimated GFR (Non-


American 


  15.9 


 


 


BUN/Creatinine Ratio  22.3 


 


Random Glucose  88 mg/dl 


 


Calcium Level  8.0 mg/dl 











Impression


Patient is a 83 year old female, admitted after a fall after tripping on rug 

resulting on L 5th metatarsal fracture. She has issues w diastolic HF, volume 

overload. Her Cr bumped from 1.6 to 2.6, Jose Manuel 6, Na 133, her LFTs are mildly 

elevated but at baseline. In speaking w her she denies increased SOB, abd 

distension, unsure about her leg edema. Weight is up from 195lbs to 202lbs.





Plan


- Will obtain u/s to check for ascites. If present, can at least perform 

diagnostic tap to r/o SBP. Would be cautious with large volume paracentesis 

given ARF


- Nephrology following, appreciate help w diuretic management (currently on 

Metalozone 5mg, Lasix 80mg IV BID). She will receive Albumin 2% 12.5g q12h. 

Will discuss with Nephrology to consider Midodrine/Octreotide if renal function 

doesn't improve despite Albumin support


- 2g Na, FL 1L a day.


- Will continue to follow along for now. 





Attending attestation





I have seen, examined this patient, and agree with the findings and above by 

our mid-level provider ROBYN Moody.


-Concern for HRS given Urine Na


-Discussed with Nephrology, will hold lasix and continue Albumin, if Cr fails 

to improve, then consider adding tx for HRS


-If has ascites then will need diagnostic tap, concern that RHF is large 

driving force of her clinical presentation, Cardiology may need to see, if has 

increased dyspnea or pulmonary edema with Albumin, then will need lasix

## 2018-05-21 NOTE — PROGRESS NOTE
Medicine Progress Note


Date & Time of Visit:


May 21, 2018 at 14:30.


Subjective


Seen resting in bedside chair, comfortable


In good spirits


States she feels about the same as yesterday


Notes increased leg swelling


Also has pain over her left leg-starting from her thigh





Denies chest pain, shortness of breath, abdominal pain, nausea


No other symptoms





Objective





Last 8 Hrs








  Date Time  Temp Pulse Resp B/P (MAP) Pulse Ox O2 Delivery O2 Flow Rate FiO2


 


5/21/18 11:20      Room Air  


 


5/21/18 11:00 36.5 55 18 100/64 (76) 95 Room Air  


 


5/21/18 07:30      Room Air  


 


5/21/18 07:00 36.7 58 24 89/53 (65) 93 Room Air  








Physical Exam:


General- oriented x 3, not in distress, speaks in sentences with no effort





Head- atraumatic





Eyes- anicteric





Neck- no JVD





Lungs-faint rales at the bases, no wheezes





Heart- regular rhythm; no murmur, normal rate





Abdomen- normal bowel sounds, soft, nontender, mild distention





Extremities- grade 2 lower leg edema, no erythema/warmth/tenderness, (+) 

dressing in place








Neuro- alert, oriented x 3; no gross focal neurologic deficits





Skin- warm & dry


Laboratory Results:





Last 24 Hours








Test


  5/20/18


19:38 5/21/18


05:11


 


Urine Color DK YELLOW  


 


Urine Appearance CLOUDY  


 


Urine pH 5.0  


 


Urine Specific Gravity 1.016  


 


Urine Protein 1+  


 


Urine Glucose (UA) NEG  


 


Urine Ketones NEG  


 


Urine Occult Blood 2+  


 


Urine Nitrite NEG  


 


Urine Bilirubin NEG  


 


Urine Urobilinogen NEG  


 


Urine Leukocyte Esterase TRACE  


 


Urine WBC (Auto) 1-5 /hpf  


 


Urine RBC (Auto) 0-4 /hpf  


 


Urine Hyaline Casts (Auto) >30 /lpf  


 


Urine Epithelial Cells (Auto) >30 /lpf  


 


Urine Bacteria (Auto) NEG  


 


Urine Renal Epithelial Cells 10-20 /lpf  


 


Urine Pathogenic Casts  /lpf  


 


Urine Random Creatinine 79.7 mg/dl  


 


Urine Random Sodium 6 mEq/L  


 


Prothrombin Time  24.9 SECONDS 


 


Prothromb Time International


Ratio 


  2.4 


 


 


Sodium Level  133 mmol/L 


 


Potassium Level  4.0 mmol/L 


 


Chloride Level  96 mmol/L 


 


Carbon Dioxide Level  29 mmol/L 


 


Anion Gap  8.0 mmol/L 


 


Blood Urea Nitrogen  60 mg/dl 


 


Creatinine  2.68 mg/dl 


 


Est Creatinine Clear Calc


Drug Dose 


  16.0 ml/min 


 


 


Estimated GFR (


American) 


  18.5 


 


 


Estimated GFR (Non-


American 


  15.9 


 


 


BUN/Creatinine Ratio  22.3 


 


Random Glucose  88 mg/dl 


 


Calcium Level  8.0 mg/dl 











Assessment & Plan


BILATERAL LOWER LEG EDEMA LIKELY SECONDARY TO:


ACUTE ON CHRONIC DIASTOLIC CHF


HISTORY OF LIVER CIRRHOSIS


Weight gain, bilateral leg swelling/seepage despite compliance with home 

diuretic Rx


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 

ultrasound 


-- Started Lasix 80mg IV + Albumin q12h


--Creatinine today further increased from 2.1-2.6


   -100 fluid balance


   Still has significant lower leg edema bilaterally


--Discussed with Dr. Landis


   Start metolazone 5 mg p.o. daily


   Continue Lasix 80 mg IV albumin every 12 hours


--Monitor response


   Monitor creatinine





CIRRHOSIS


US demonstrated moderate ascites during last admission


will repeat US today to quantify


on above diuretics


GI consulted





ACUTE ON CKD III


Serum creatinine 2.01 at time of admission.


Nephrology consulted.


Crea increased from 2.1 to 2.6


monitor





Paroxysmal atrial fibrillation


On Coumadin


-- Episodic bradycardia since increase in beta-blocker frequency adjustment 

from recent confinement


-- Metoprolol reduced to 25mg BID from TID   


    Heart rate still in the mid 50s


    Further reduce metoprolol to 12.5 mg p.o. twice daily


     Monitor





-- INR 2.4


    coumadin 2.5mg po daily


    INR daily





LEFT FOOT METATARSAL FRACTURES


s/p Fall, Mechanical


Foot Xray


1.  Acute left fifth metatarsal fracture 


2.  Subacute fractures of her second and third metatarsals.


-- Ortho consulted


   non weightbearing x 2 weeks


   ff up with Ortho


--Patient reporting of left hip pain today


  We will also order x-rays for the hip, femur, knee, tibia-fibula





HTN


stable 


Reducing the metoprolol dose


Monitor closely





Chronic hyponatremia secondary to chronic liver disease.


- stable





Chronic anemia 2 to CKD


-Monitor hemoglobin








DVT prophylaxis, Coumadin INR 2-3





DNR. 





DISPOSITION


Management of volume overload in progress


Discussed with case management


Transition to rehab/skilled nursing facility when medically stable


Current Inpatient Medications:





Current Inpatient Medications








 Medications


  (Trade)  Dose


 Ordered  Sig/Diego


 Route  Start Time


 Stop Time Status Last Admin


Dose Admin


 


 Tramadol HCl


  (Ultram Tab)  not


 relieved


 by tylenol @   Q6H  PRN


 PO  5/19/18 19:45


 6/18/18 19:44   


 


 


 Hydromorphone HCl


  (Dilaudid Inj)  0.5 mg  Q3H  PRN


 IV  5/19/18 19:45


 6/2/18 19:44   


 


 


 Miscellaneous


  (Iv Fluids


 Completed)  1 ea  PRN  PRN


 N/A  5/19/18 22:00


 5/19/19 21:59   


 


 


 Amiodarone HCl


  (Cordarone Tab)  200 mg  QAM


 PO  5/20/18 09:00


 6/19/18 08:59  5/21/18 08:17


200 MG


 


 Atorvastatin


 Calcium


  (Lipitor Tab)  40 mg  QAM


 PO  5/20/18 09:00


 6/19/18 08:59  5/21/18 08:17


40 MG


 


 Salmeterol


 Xinafoate/


 Fluticasone


  (Advair Diskus


 100/50 Inh)  1 puff  BID


 INH  5/20/18 09:00


 6/19/18 08:59  5/21/18 08:18


1 PUFF


 


 Fluticasone


 Propionate


  (Flonase Nasal


 Spray)  2 sprays  DAILY  PRN


 BLADIMIR  5/19/18 22:30


 6/18/18 22:29   


 


 


 Pantoprazole


 Sodium


  (Protonix Tab)  40 mg  DAILY  PRN


 PO  5/19/18 22:30


 6/18/18 22:29   


 


 


 Ranitidine HCl


  (zANTac TAB)  300 mg  HS  PRN


 PO  5/19/18 22:30


 6/18/18 22:29   


 


 


 Albuterol/


 Ipratropium


  (Duoneb)  3 ml  Q2H  PRN


 INH  5/20/18 01:00


 6/19/18 00:59   


 


 


 Acetaminophen


  (Tylenol Tab)  325 mg  Q4H  PRN


 PO  5/20/18 07:15


 6/18/18 19:44   


 


 


 Miconazole Nitrate


  (Desenex Powder)  1 appln  UD  PRN


 EXT  5/20/18 10:15


 6/19/18 10:14   


 


 


 Warfarin Sodium


  (Coumadin Tab)  2.5 mg  DAILY@16


 PO  5/20/18 16:00


 6/19/18 15:59  5/20/18 17:19


2.5 MG


 


 Furosemide 80 mg/


 Albumin Human  58 ml @ 60


 mls/hr  Q12H


 IV  5/21/18 02:00


 5/24/18 01:59  5/21/18 02:23


60 MLS/HR


 


 Metolazone


  (Zaroxolyn Tab)  5 mg  QAM


 PO  5/22/18 09:00


 6/21/18 08:59   


 


 


 Metoprolol


 Tartrate


  (Lopressor Tab)  12.5 mg  BID


 PO  5/21/18 21:00


 6/19/18 08:59 UNV

## 2018-05-21 NOTE — CONSULTANT RECOMMENDATIONS
Consultant Recommendations


Date of Service


May 21, 2018.





Consultant Recommendations


NWB Left lower extremity


Boot on left ankle when out of bed, okay to remove when out of bed.


Allowed for ankle and toe range of motion.


Ice and elevation left leg as needed for pain/swelling.


Follow up with Dr. Shaikh on 6/5/18 at 8:45 a.m.


Please call [phone] with any questions or concerns or need to reschedule 

appointment.

## 2018-05-21 NOTE — PROGRESS NOTE
Progress Note


Date of Service


May 21, 2018.





Progress Note


Patient doing well.  No complaints of pain, currently being seen by PT.  She 

did get her post op shoe from home.  She wearing when out of bed.  She states it

's hard for her to not bear weight on her leg.  Therapy confirms, that she's 

not able to do this adequately.  She is using a walker to assist with 

ambulation.


Left foot mildly swollen, toes move well.  pulses not palpated, foot is warm


Continue OOB/NWB LLE with post op shoe on left foot.  Use walker to assist with 

ambulation.  


Follow up with Dr. Shaikh in approximately 2 weeks, appointment scheduled 

for 6/5/18 at 8:45 a.m.


Please call [phone] with any questions.


Okay from ortho standpoint for discharge when medically stable.

## 2018-05-22 VITALS
DIASTOLIC BLOOD PRESSURE: 62 MMHG | TEMPERATURE: 98.06 F | SYSTOLIC BLOOD PRESSURE: 96 MMHG | OXYGEN SATURATION: 94 % | HEART RATE: 52 BPM

## 2018-05-22 VITALS
SYSTOLIC BLOOD PRESSURE: 104 MMHG | OXYGEN SATURATION: 92 % | DIASTOLIC BLOOD PRESSURE: 68 MMHG | TEMPERATURE: 97.88 F | HEART RATE: 55 BPM

## 2018-05-22 VITALS
HEART RATE: 60 BPM | TEMPERATURE: 97.34 F | DIASTOLIC BLOOD PRESSURE: 72 MMHG | SYSTOLIC BLOOD PRESSURE: 113 MMHG | OXYGEN SATURATION: 92 %

## 2018-05-22 VITALS
OXYGEN SATURATION: 95 % | TEMPERATURE: 97.52 F | SYSTOLIC BLOOD PRESSURE: 108 MMHG | DIASTOLIC BLOOD PRESSURE: 57 MMHG | HEART RATE: 57 BPM

## 2018-05-22 VITALS — OXYGEN SATURATION: 94 %

## 2018-05-22 VITALS — SYSTOLIC BLOOD PRESSURE: 85 MMHG | HEART RATE: 56 BPM | DIASTOLIC BLOOD PRESSURE: 56 MMHG

## 2018-05-22 VITALS
SYSTOLIC BLOOD PRESSURE: 92 MMHG | DIASTOLIC BLOOD PRESSURE: 65 MMHG | HEART RATE: 56 BPM | OXYGEN SATURATION: 97 % | TEMPERATURE: 98.06 F

## 2018-05-22 VITALS — OXYGEN SATURATION: 97 %

## 2018-05-22 VITALS — OXYGEN SATURATION: 92 %

## 2018-05-22 LAB
BUN SERPL-MCNC: 63 MG/DL (ref 7–18)
CALCIUM SERPL-MCNC: 8.2 MG/DL (ref 8.5–10.1)
CO2 SERPL-SCNC: 28 MMOL/L (ref 21–32)
CREAT SERPL-MCNC: 2.83 MG/DL (ref 0.6–1.2)
GLUCOSE SERPL-MCNC: 81 MG/DL (ref 70–99)
INR PPP: 2.9 (ref 0.9–1.1)
POTASSIUM SERPL-SCNC: 3.9 MMOL/L (ref 3.5–5.1)
SODIUM SERPL-SCNC: 130 MMOL/L (ref 136–145)

## 2018-05-22 RX ADMIN — ALBUMIN HUMAN SCH GM: 250 SOLUTION INTRAVENOUS at 13:24

## 2018-05-22 RX ADMIN — ALBUMIN HUMAN SCH GM: 250 SOLUTION INTRAVENOUS at 22:18

## 2018-05-22 RX ADMIN — AMIODARONE HYDROCHLORIDE SCH MG: 200 TABLET ORAL at 07:44

## 2018-05-22 RX ADMIN — FLUTICASONE PROPIONATE AND SALMETEROL SCH PUFF: 50; 100 POWDER RESPIRATORY (INHALATION) at 07:43

## 2018-05-22 RX ADMIN — ALBUMIN HUMAN SCH GM: 250 SOLUTION INTRAVENOUS at 07:53

## 2018-05-22 RX ADMIN — ALBUMIN HUMAN SCH GM: 250 SOLUTION INTRAVENOUS at 13:17

## 2018-05-22 RX ADMIN — ALBUMIN HUMAN SCH GM: 250 SOLUTION INTRAVENOUS at 21:20

## 2018-05-22 RX ADMIN — ALBUMIN HUMAN SCH GM: 250 SOLUTION INTRAVENOUS at 18:20

## 2018-05-22 RX ADMIN — ATORVASTATIN CALCIUM SCH MG: 40 TABLET, FILM COATED ORAL at 07:44

## 2018-05-22 RX ADMIN — METOPROLOL TARTRATE SCH MG: 25 TABLET, FILM COATED ORAL at 07:44

## 2018-05-22 RX ADMIN — ALBUMIN HUMAN SCH GM: 250 SOLUTION INTRAVENOUS at 17:24

## 2018-05-22 RX ADMIN — METOPROLOL TARTRATE SCH MG: 25 TABLET, FILM COATED ORAL at 21:00

## 2018-05-22 RX ADMIN — FLUTICASONE PROPIONATE AND SALMETEROL SCH PUFF: 50; 100 POWDER RESPIRATORY (INHALATION) at 21:15

## 2018-05-22 NOTE — ECHOCARDIOGRAM REPORT
*NOTICE TO RECEIVING PARTY AGENCY**  This information is strictly Confidential and 
protected under [place] law.  [place] law prohibits you from making any further 
disclosure of this information unless further disclosure is expressly permitted by the 
written consent of the person to whom it pertains or is authorized by law.  A general 
authorization for the release of medical or other information is not sufficient for this 
purpose.  Hospital accepts no responsibility if the information is made available to any 
other person, INCLUDING THE PATIENT.



Interpretation Summary

  *  Name: AMARILYS RYAN  Study Date: 2018 11:31 AM  BP: 113/72 mmHg

  *  MRN: [id number]  Patient Location: [facility]  HR: 67

  *  : 1935 (M/d/yyyy)  Gender: Female  Height: 59 in

  *  Age: 83 yrs  Ethnicity: [place]  Weight: 201 lb

  *  Ordering Physician: KALI RYDER MD

  *  Performed By: Haylie Boyd RCS

  *  Accession# [id number]  Account# [id number]

  *  Reason For Study: CHF

  *  BSA: 1.8 m2

  *  -- Conclusions --

  *  There is mild concentric left ventricular hypertrophy.

  *  The left ventricular wall motion is normal.

  *  Ejection Fraction = 55-60%.

  *  The right ventricle is moderately dilated.

  *  The right ventricular systolic function is mild to moderately reduced.

  *  The left atrium is severely dilated.

  *  The right atrium is moderately dilated.

  *  There is mild mitral regurgitation.

  *  There is mild to moderate tricuspid regurgitation.

  *  Mild pulmonary hypertension is present.

  *  The PA systolic pressure is calculated to be 40 mm Hg.

  *  Perihepatic ascites is noted.

Procedure Details

  *  A complete two-dimensional transthoracic echocardiogram was performed (2D, M-mode, 
Doppler and color flow Doppler).

Left Ventricle

  *  The left ventricle is normal in size.

  *  There is mild concentric left ventricular hypertrophy.

  *  Left ventricular systolic function is normal.

  *  Ejection Fraction = 55-60%.

  *  The left ventricular wall motion is normal.

Right Ventricle

  *  The right ventricle is moderately dilated.

  *  The right ventricular systolic function is mild to moderately reduced.

Atria

  *  The left atrium is severely dilated.

  *  The right atrium is moderately dilated.

  *  There is no evidence of atrial septal defect, but resolution does not allow 
assessment for a patent foramen ovale.

Mitral Valve

  *  The mitral valve is normal.

  *  There is no mitral valve stenosis.

  *  There is mild mitral regurgitation.

Tricuspid Valve

  *  The tricuspid valve is normal.

  *  There is no tricuspid stenosis.

  *  There is mild to moderate tricuspid regurgitation.

  *  Mild pulmonary hypertension is present. The PA systolic pressure is calculated to be 
40 mm Hg.

Aortic Valve

  *  The aortic valve is trileaflet.

  *  Aortic valve sclerosis mild, without significant aortic valvular stenosis.

  *  Aortic stenosis is absent.

  *  There is no significant aortic regurgitation.

Pulmonic Valve

  *  The pulmonary valve is not well seen, but the Doppler examination is normal without 
significant regurgitation or stenosis.

Great Vessels

  *  The aortic root and proximal ascending aorta are normal sized.

Pericardium/Pleural

  *  There is no pericardial effusion.

Great Vessels

  *  Normal inferior vena cava diameter and respiratory variation suggests normal central 
venous pressure.

Left Ventricular Diastolic Function

  *  The LV diastolic function is abnormal.



MMode 2D Measurements and Calculations

IVSd 1.3 cm

IVSs 1.5 cm



LVIDd 4.6 cm

LVIDs 2.9 cm

LVPWd 1.2 cm

LVPWs 1.6 cm



IVS/LVPW 1.0 

FS 36.5 %

EDV(Teich) 99.6 ml

ESV(Teich) 33.5 ml

EF(Teich) 66.3 %



EDV(cubed) 100.2 ml

ESV(cubed) 25.6 ml

EF(cubed) 74.4 %

% IVS thick 12.7 %

% LVPW thick 30.3 %





LV mass(C)d 223.0 grams

LV mass(C)dI 120.7 grams/m\S\2

LV mass(C)s 159.2 grams

LV mass(C)sI 86.1 grams/m\S\2



SV(Teich) 66.1 ml

SI(Teich) 35.7 ml/m\S\2

SV(cubed) 74.6 ml

SI(cubed) 40.4 ml/m\S\2



Ao root diam 2.6 cm

Ao root area 5.2 cm\S\2

ACS 1.8 cm

LA dimension 6.5 cm



LA/Ao 2.5 

LVOT diam 1.9 cm

LVOT area 2.9 cm\S\2





LVAd ap4 26.2 cm\S\2

LVLd ap4 6.9 cm

EDV(MOD-sp4) 83.6 ml

EDV(sp4-el) 84.9 ml

LVAs ap4 16.4 cm\S\2

LVLs ap4 5.9 cm

ESV(MOD-sp4) 39.2 ml

ESV(sp4-el) 38.8 ml

EF(MOD-sp4) 53.1 %

EF(sp4-el) 54.2 %



LVAd ap2 23.5 cm\S\2

LVLd ap2 7.0 cm

EDV(MOD-sp2) 64.7 ml

EDV(sp2-el) 66.9 ml

LVAs ap2 14.0 cm\S\2

LVLs ap2 6.5 cm

ESV(MOD-sp2) 25.7 ml

ESV(sp2-el) 25.8 ml

EF(MOD-sp2) 60.3 %

EF(sp2-el) 61.5 %



LVLd %diff 1.9 %

EDV(MOD-bp) 74.2 ml

LVLs %diff 9.2 %

ESV(MOD-bp) 33.0 ml

EF(MOD-bp) 55.6 %



SV(MOD-sp4) 44.4 ml

SI(MOD-sp4) 24.0 ml/m\S\2





SV(MOD-sp2) 39.0 ml

SI(MOD-sp2) 21.1 ml/m\S\2



SV(MOD-bp) 41.2 ml

SI(MOD-bp) 22.3 ml/m\S\2



SV(sp4-el) 46.0 ml

SI(sp4-el) 24.9 ml/m\S\2



SV(sp2-el) 41.2 ml

SI(sp2-el) 22.3 ml/m\S\2







Doppler Measurements and Calculations

MV E max chasidy 100.3 cm/sec



MV P1/2t max chasidy 116.5 cm/sec

MV P1/2t 66.9 msec

MVA(P1/2t) 3.3 cm\S\2

MV dec slope 509.6 cm/sec\S\2

MV dec time 0.22 sec



Ao V2 max 140.8 cm/sec

Ao max PG 7.9 mmHg

Ao max PG (full) 5.0 mmHg

ANGI(V,A) 1.8 cm\S\2

ANGI(V,D) 1.8 cm\S\2



LV V1 max PG 3.0 mmHg





LV V1 max 86.2 cm/sec



MR max chasidy 403.0 cm/sec

MR max PG 65.0 mmHg



PA V2 max 82.8 cm/sec

PA max PG 2.7 mmHg



TR max chasidy 281.9 cm/sec

## 2018-05-22 NOTE — NEPHROLOGY PROGRESS NOTE
Nephrology Progress Note


Date of Service:


May 22, 2018.


Subjective


84 yo female with ckd, right sided heart failure, cirrhosis with worsening 

kidney function in the setting of diuresis.  continues to have significant 

edema in legs.  unsteady on her feet.





Objective











  Date Time  Temp Pulse Resp B/P (MAP) Pulse Ox O2 Delivery O2 Flow Rate FiO2


 


5/22/18 07:50      Room Air  


 


5/22/18 06:06 36.7 52 18 96/62 (73) 94 Room Air  


 


5/22/18 04:00     92 Room Air  


 


5/22/18 03:46 36.6 55 19 104/68 (80) 92 Room Air  


 


5/22/18 00:31  56  85/56 (66)    


 


5/22/18 00:00     94 Room Air  


 


5/21/18 23:59  56  91/64 (73)    


 


5/21/18 23:35  56  83/56 (65)    


 


5/21/18 23:00 36.4 60 18 96/43 (60) 94 Room Air  


 


5/21/18 21:30  61  104/71 (82)    


 


5/21/18 20:00     92 Room Air  


 


5/21/18 19:13 36.2 55 15 109/57 (74) 92 Room Air  


 


5/21/18 16:11 36.4 59 17 96/59 (71) 91 Room Air  


 


5/21/18 15:30      Room Air  


 


5/21/18 11:20      Room Air  


 


5/21/18 11:00 36.5 55 18 100/64 (76) 95 Room Air  








Physical Exam:


General-aaox3, obese


Eyes-no scleral icterus


ENT-mmm


Neck-supple


Lungs-cta


Heart-ancelmo


Abdomen-bs+, pannus 


Extremities-+2 edema


Neuro-nonfocal





Current Inpatient Medications








 Medications


  (Trade)  Dose


 Ordered  Sig/Diego


 Route  Start Time


 Stop Time Status Last Admin


Dose Admin


 


 Tramadol HCl


  (Ultram Tab)  not


 relieved


 by tylenol @   Q6H  PRN


 PO  5/19/18 19:45


 6/18/18 19:44   


 


 


 Hydromorphone HCl


  (Dilaudid Inj)  0.5 mg  Q3H  PRN


 IV  5/19/18 19:45


 6/2/18 19:44   


 


 


 Miscellaneous


  (Iv Fluids


 Completed)  1 ea  PRN  PRN


 N/A  5/19/18 22:00


 5/19/19 21:59   


 


 


 Amiodarone HCl


  (Cordarone Tab)  200 mg  QAM


 PO  5/20/18 09:00


 6/19/18 08:59  5/22/18 07:44


200 MG


 


 Atorvastatin


 Calcium


  (Lipitor Tab)  40 mg  QAM


 PO  5/20/18 09:00


 6/19/18 08:59  5/22/18 07:44


40 MG


 


 Salmeterol


 Xinafoate/


 Fluticasone


  (Advair Diskus


 100/50 Inh)  1 puff  BID


 INH  5/20/18 09:00


 6/19/18 08:59  5/22/18 07:43


1 PUFF


 


 Fluticasone


 Propionate


  (Flonase Nasal


 Spray)  2 sprays  DAILY  PRN


 BLADIMIR  5/19/18 22:30


 6/18/18 22:29   


 


 


 Pantoprazole


 Sodium


  (Protonix Tab)  40 mg  DAILY  PRN


 PO  5/19/18 22:30


 6/18/18 22:29   


 


 


 Ranitidine HCl


  (zANTac TAB)  300 mg  HS  PRN


 PO  5/19/18 22:30


 6/18/18 22:29   


 


 


 Albuterol/


 Ipratropium


  (Duoneb)  3 ml  Q2H  PRN


 INH  5/20/18 01:00


 6/19/18 00:59   


 


 


 Acetaminophen


  (Tylenol Tab)  325 mg  Q4H  PRN


 PO  5/20/18 07:15


 6/18/18 19:44   


 


 


 Miconazole Nitrate


  (Desenex Powder)  1 appln  UD  PRN


 EXT  5/20/18 10:15


 6/19/18 10:14   


 


 


 Warfarin Sodium


  (Coumadin Tab)  2.5 mg  DAILY@16


 PO  5/20/18 16:00


 6/19/18 15:59  5/21/18 16:45


2.5 MG


 


 Metoprolol


 Tartrate


  (Lopressor Tab)  12.5 mg  BID


 PO  5/21/18 21:00


 6/19/18 08:59  5/21/18 21:33


12.5 MG


 


 Albumin Human


  (Albumin 25%)  25 gm  BID


 IV  5/21/18 17:15


 5/24/18 17:14  5/22/18 07:53


25 GM











Last 24 Hours








Test


  5/22/18


05:46


 


Prothrombin Time 29.5 SECONDS 


 


Prothromb Time International


Ratio 2.9 


 


 


Sodium Level 130 mmol/L 


 


Potassium Level 3.9 mmol/L 


 


Chloride Level 94 mmol/L 


 


Carbon Dioxide Level 28 mmol/L 


 


Anion Gap 8.0 mmol/L 


 


Blood Urea Nitrogen 63 mg/dl 


 


Creatinine 2.83 mg/dl 


 


Est Creatinine Clear Calc


Drug Dose 15.1 ml/min 


 


 


Estimated GFR (


American) 17.3 


 


 


Estimated GFR (Non-


American 14.9 


 


 


BUN/Creatinine Ratio 22.2 


 


Random Glucose 81 mg/dl 


 


Calcium Level 8.2 mg/dl 











Assessment & Plan


DENY-oliguric-low urine sodium in the setting of cirrhosis.  personally do not 

feel this is hepatorenal syndrome. feel this is more intravascular volume 

depletion.  stopped diuretics yesterday and started albumin 25 grams iv bid.  

today, will give 100 grams of albumin and reduce back to 25 bid tomorrow. to 

recheck random urine sodium tomorrow to see if we are improving perfusion to 

the kidneys.

## 2018-05-22 NOTE — PROGRESS NOTE
Medicine Progress Note


Date & Time of Visit:


May 22, 2018 at 10:57.


Subjective


Sitting up in bedside chair, comfortable


States she feels fine


Has less leg pain, only left foot pain with mobility


Denies chest pain, dyspnea, dizziness, nausea vomiting


Denies other symptoms





Objective





Last 8 Hrs








  Date Time  Temp Pulse Resp B/P (MAP) Pulse Ox O2 Delivery O2 Flow Rate FiO2


 


5/22/18 07:50      Room Air  


 


5/22/18 06:06 36.7 52 18 96/62 (73) 94 Room Air  


 


5/22/18 04:00     92 Room Air  


 


5/22/18 03:46 36.6 55 19 104/68 (80) 92 Room Air  








Physical Exam:


General- oriented x 3, not in distress, speaks in sentences with no effort





Head- atraumatic





Eyes- anicteric





Neck- no JVD





Lungs- mild bilateral rales, no wheezing





Heart- regular rhythm; no murmur, normal rate





Abdomen- normal bowel sounds, soft, nontender, mild distention





Extremities- grade 2 lower leg edema- somewhat improved from yesterday, no 

erythema/warmth/tenderness, (+) dressing in place





Neuro- alert, oriented x 3; no gross focal neurologic deficits





Skin- warm & dry


Laboratory Results:





Last 24 Hours








Test


  5/22/18


05:46


 


Prothrombin Time 29.5 SECONDS 


 


Prothromb Time International


Ratio 2.9 


 


 


Sodium Level 130 mmol/L 


 


Potassium Level 3.9 mmol/L 


 


Chloride Level 94 mmol/L 


 


Carbon Dioxide Level 28 mmol/L 


 


Anion Gap 8.0 mmol/L 


 


Blood Urea Nitrogen 63 mg/dl 


 


Creatinine 2.83 mg/dl 


 


Est Creatinine Clear Calc


Drug Dose 15.1 ml/min 


 


 


Estimated GFR (


American) 17.3 


 


 


Estimated GFR (Non-


American 14.9 


 


 


BUN/Creatinine Ratio 22.2 


 


Random Glucose 81 mg/dl 


 


Calcium Level 8.2 mg/dl 











Assessment & Plan


BILATERAL LOWER LEG EDEMA LIKELY SECONDARY TO:


ACUTE ON CHRONIC DIASTOLIC CHF


HISTORY OF LIVER CIRRHOSIS


Weight gain, bilateral leg swelling/seepage despite compliance with home 

diuretic Rx


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 

ultrasound 


-- Started Lasix 80mg IV + Albumin q12h onhospital day 1


-- Creatinine  further increased from 2.1 to 2.6


   -100 fluid balance


   Still had significant lower leg edema bilaterally


-- Lasix held 


    given increased Albumin


-- crea still increased to 2.8


    discussed with Nephro


    continue Albumin for now, monitor I/0 and crea


    Echo ordered





NAFLD CIRRHOSIS


repeat US: moderate ascites


GI consulted


possible hepatorenal syndrome?





ACUTE RENAL FAILURE ON CKD III


Serum creatinine 2.01 at time of admission.


Nephrology consulted.


Crea increased from 2.1 to 2.8


management as noted above





Paroxysmal atrial fibrillation


On Coumadin


-- Episodic bradycardia since increase in beta-blocker frequency adjustment 

from recent confinement


-- Metoprolol reduced to 25mg BID from TID   


    Heart rate still in the mid 50s


    Further reduce metoprolol to 12.5 mg p.o. twice daily


     Monitor





-- INR 2.9


    hold coumadin 2.5mg po daily


    INR daily





LEFT FOOT METATARSAL FRACTURES


s/p Fall, Mechanical


Foot Xray


1.  Acute left fifth metatarsal fracture 


2.  Subacute fractures of her second and third metatarsals.


-- Ortho consulted


   non weightbearing x 2 weeks


   ff up with Ortho


--Patient reporting of left hip pain, now resolved


   x-rays for the hip, femur, knee, tibia-fibula: no fracture





HTN


stable 


Reducing the metoprolol dose


Monitor closely





Chronic hyponatremia secondary to chronic liver disease.


- stable





Chronic anemia 2 to CKD


-Monitor hemoglobin





DVT prophylaxis


inr 2.9


coumadin held for today


monitor





DNR. 





DISPOSITION


Management of volume overload in progress


Discussed with case management


Transition to rehab/skilled nursing facility when medically stable





Long discussion with patient and her daughter in the afternoon


They are requesting a patient to be transferred to [facility]


Called [facility] and spoke with hospitalist, she has declined a transfer as 

according to her the patient is being manage adequately and they will not do 

anything different


Called patient's daughter to give her this update, and she is fine to keep 

patient in the hospital at this time will continue discussion tomorrow


Current Inpatient Medications:





Current Inpatient Medications








 Medications


  (Trade)  Dose


 Ordered  Sig/Diego


 Route  Start Time


 Stop Time Status Last Admin


Dose Admin


 


 Tramadol HCl


  (Ultram Tab)  not


 relieved


 by tylenol @   Q6H  PRN


 PO  5/19/18 19:45


 6/18/18 19:44   


 


 


 Hydromorphone HCl


  (Dilaudid Inj)  0.5 mg  Q3H  PRN


 IV  5/19/18 19:45


 6/2/18 19:44   


 


 


 Miscellaneous


  (Iv Fluids


 Completed)  1 ea  PRN  PRN


 N/A  5/19/18 22:00


 5/19/19 21:59   


 


 


 Amiodarone HCl


  (Cordarone Tab)  200 mg  QAM


 PO  5/20/18 09:00


 6/19/18 08:59  5/22/18 07:44


200 MG


 


 Atorvastatin


 Calcium


  (Lipitor Tab)  40 mg  QAM


 PO  5/20/18 09:00


 6/19/18 08:59  5/22/18 07:44


40 MG


 


 Salmeterol


 Xinafoate/


 Fluticasone


  (Advair Diskus


 100/50 Inh)  1 puff  BID


 INH  5/20/18 09:00


 6/19/18 08:59  5/22/18 07:43


1 PUFF


 


 Fluticasone


 Propionate


  (Flonase Nasal


 Spray)  2 sprays  DAILY  PRN


 BLADIMIR  5/19/18 22:30


 6/18/18 22:29   


 


 


 Pantoprazole


 Sodium


  (Protonix Tab)  40 mg  DAILY  PRN


 PO  5/19/18 22:30


 6/18/18 22:29   


 


 


 Ranitidine HCl


  (zANTac TAB)  300 mg  HS  PRN


 PO  5/19/18 22:30


 6/18/18 22:29   


 


 


 Albuterol/


 Ipratropium


  (Duoneb)  3 ml  Q2H  PRN


 INH  5/20/18 01:00


 6/19/18 00:59   


 


 


 Acetaminophen


  (Tylenol Tab)  325 mg  Q4H  PRN


 PO  5/20/18 07:15


 6/18/18 19:44   


 


 


 Miconazole Nitrate


  (Desenex Powder)  1 appln  UD  PRN


 EXT  5/20/18 10:15


 6/19/18 10:14   


 


 


 Metoprolol


 Tartrate


  (Lopressor Tab)  12.5 mg  BID


 PO  5/21/18 21:00


 6/19/18 08:59  5/21/18 21:33


12.5 MG


 


 Albumin Human


  (Albumin 25%)  12.5 gm  QID


 IV  5/22/18 13:00


 5/24/18 17:14   


 


 


 Albumin Human


  (Albumin 25%)  12.5 gm  QID@1000,1400,1800,2200


 IV  5/22/18 14:00


 5/24/18 18:14

## 2018-05-22 NOTE — GASTROENTEROLOGY PROGRESS NOTE
Progress Note


Date of Service:  May 22, 2018


Subjective


Pt evaluation today including:  conversation w/ patient, physical exam, chart 

review, lab review


Pt was seen and evaluated, chart reviewed. History cirrhosis, admitted with 

fracture of L metatarsal and DENY, CRT 2.8. US yesterday w/ moderate ascites. 

Denies any complaints this AM. No nausea, vomiting, abdominal pain, diarrhea/

constipation, black or bloody stools. Denies fever, chills, CP, SOB





US ABD 5/22/18: No change in the moderate ascites seen throughout the abdomen. 

Nodular contour to the liver consistent with cirrhosis.





Review of Systems


Constitutional:  No fever, No chills


Respiratory:  No cough, No shortness of breath


Cardiac:  No chest pain, No edema


Abdomen:  No pain, No nausea, No vomiting, No diarrhea, No constipation, No GI 

bleeding


Skin:  No rash, No itch, No color change, No bleeding





Medications





Current Inpatient Medications








 Medications


  (Trade)  Dose


 Ordered  Sig/Diego


 Route  Start Time


 Stop Time Status Last Admin


Dose Admin


 


 Tramadol HCl


  (Ultram Tab)  not


 relieved


 by tylenol @   Q6H  PRN


 PO  5/19/18 19:45


 6/18/18 19:44   


 


 


 Hydromorphone HCl


  (Dilaudid Inj)  0.5 mg  Q3H  PRN


 IV  5/19/18 19:45


 6/2/18 19:44   


 


 


 Miscellaneous


  (Iv Fluids


 Completed)  1 ea  PRN  PRN


 N/A  5/19/18 22:00


 5/19/19 21:59   


 


 


 Amiodarone HCl


  (Cordarone Tab)  200 mg  QAM


 PO  5/20/18 09:00


 6/19/18 08:59  5/22/18 07:44


200 MG


 


 Atorvastatin


 Calcium


  (Lipitor Tab)  40 mg  QAM


 PO  5/20/18 09:00


 6/19/18 08:59  5/22/18 07:44


40 MG


 


 Salmeterol


 Xinafoate/


 Fluticasone


  (Advair Diskus


 100/50 Inh)  1 puff  BID


 INH  5/20/18 09:00


 6/19/18 08:59  5/22/18 07:43


1 PUFF


 


 Fluticasone


 Propionate


  (Flonase Nasal


 Spray)  2 sprays  DAILY  PRN


 BLADIMIR  5/19/18 22:30


 6/18/18 22:29   


 


 


 Pantoprazole


 Sodium


  (Protonix Tab)  40 mg  DAILY  PRN


 PO  5/19/18 22:30


 6/18/18 22:29   


 


 


 Ranitidine HCl


  (zANTac TAB)  300 mg  HS  PRN


 PO  5/19/18 22:30


 6/18/18 22:29   


 


 


 Albuterol/


 Ipratropium


  (Duoneb)  3 ml  Q2H  PRN


 INH  5/20/18 01:00


 6/19/18 00:59   


 


 


 Acetaminophen


  (Tylenol Tab)  325 mg  Q4H  PRN


 PO  5/20/18 07:15


 6/18/18 19:44   


 


 


 Miconazole Nitrate


  (Desenex Powder)  1 appln  UD  PRN


 EXT  5/20/18 10:15


 6/19/18 10:14   


 


 


 Warfarin Sodium


  (Coumadin Tab)  2.5 mg  DAILY@16


 PO  5/20/18 16:00


 6/19/18 15:59  5/21/18 16:45


2.5 MG


 


 Metoprolol


 Tartrate


  (Lopressor Tab)  12.5 mg  BID


 PO  5/21/18 21:00


 6/19/18 08:59  5/21/18 21:33


12.5 MG


 


 Albumin Human


  (Albumin 25%)  25 gm  BID


 IV  5/21/18 17:15


 5/24/18 17:14  5/22/18 07:53


25 GM











Objective


Vital Signs











  Date Time  Temp Pulse Resp B/P (MAP) Pulse Ox O2 Delivery O2 Flow Rate FiO2


 


5/22/18 07:50      Room Air  


 


5/22/18 06:06 36.7 52 18 96/62 (73) 94 Room Air  


 


5/22/18 04:00     92 Room Air  


 


5/22/18 03:46 36.6 55 19 104/68 (80) 92 Room Air  


 


5/22/18 00:31  56  85/56 (66)    


 


5/22/18 00:00     94 Room Air  


 


5/21/18 23:59  56  91/64 (73)    


 


5/21/18 23:35  56  83/56 (65)    


 


5/21/18 23:00 36.4 60 18 96/43 (60) 94 Room Air  


 


5/21/18 21:30  61  104/71 (82)    


 


5/21/18 20:00     92 Room Air  


 


5/21/18 19:13 36.2 55 15 109/57 (74) 92 Room Air  


 


5/21/18 16:11 36.4 59 17 96/59 (71) 91 Room Air  


 


5/21/18 15:30      Room Air  


 


5/21/18 11:20      Room Air  


 


5/21/18 11:00 36.5 55 18 100/64 (76) 95 Room Air  











Physical Exam


General Appearance:  no apparent distress


Eyes:  PERRL


ENT:  hearing grossly normal


Neck:  supple, trachea midline


Respiratory/Chest:  lungs clear, normal breath sounds, no accessory muscle use


Cardiovascular:  regular rate, rhythm


Abdomen:  normal bowel sounds, non tender, soft, no organomegaly, no pulsatile 

mass


Neurologic/Psych:  alert, normal mood/affect, oriented x 3


Skin:  normal color, no jaundice, warm/dry, no rash





Laboratory Results





Last 24 Hours








Test


  5/22/18


05:46


 


Prothrombin Time 29.5 SECONDS 


 


Prothromb Time International


Ratio 2.9 


 


 


Sodium Level 130 mmol/L 


 


Potassium Level 3.9 mmol/L 


 


Chloride Level 94 mmol/L 


 


Carbon Dioxide Level 28 mmol/L 


 


Anion Gap 8.0 mmol/L 


 


Blood Urea Nitrogen 63 mg/dl 


 


Creatinine 2.83 mg/dl 


 


Est Creatinine Clear Calc


Drug Dose 15.1 ml/min 


 


 


Estimated GFR (


American) 17.3 


 


 


Estimated GFR (Non-


American 14.9 


 


 


BUN/Creatinine Ratio 22.2 


 


Random Glucose 81 mg/dl 


 


Calcium Level 8.2 mg/dl 











Assessment and Plan


83 year old female history of HF, admitted after a fall w/ L 5th metatarsal 

fracture. She has DENY w/ cr bumped from 1.6 to 2.8, urine NA 6 concerning for 

HRS. US ABD w/ moderate ascites. Perhaps the ascites is secondary to right 

sided heart disease.





- Ascites


   - Non urgent diagnostic paracentesis to r/o SBP   


      - No more than 3L off given DENY


      - Continue albumin as prescribed


      - Will need INR < 2


      - Please send ascites fluid protein, albumin, cell count, cytology, 

culture


- DENY/HRS


   - Hold lasix


   - CRT 2.8, baseline 1.6


   - Daily BMP


   - Urine NA 6


   - Continue albumin 25% 12.5G BID


   - Consider Midodrine/Octreotide


- Low NA diet, less than 2G


- Will continue to follow along for now. 





- GI to follow. Please call with any questions or concerns. 





Attending attestation





I have seen, examined this patient, and agree with the findings and above by 

our mid-level provider Ms. Evelyn Sebastianelli,  C RNP.








-UOP is still low, Cr slightly improved, no significant clinical change.


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 

initiation of Octreotide and midodrine, patient's family is requesting transfer.


-Also c/o RHF and PAH given clinical picture, high PA pressures on Echo in 2015

## 2018-05-23 VITALS
DIASTOLIC BLOOD PRESSURE: 63 MMHG | HEART RATE: 54 BPM | SYSTOLIC BLOOD PRESSURE: 99 MMHG | TEMPERATURE: 98.06 F | OXYGEN SATURATION: 95 %

## 2018-05-23 VITALS
OXYGEN SATURATION: 92 % | SYSTOLIC BLOOD PRESSURE: 117 MMHG | HEART RATE: 58 BPM | DIASTOLIC BLOOD PRESSURE: 69 MMHG | TEMPERATURE: 98.24 F

## 2018-05-23 VITALS
SYSTOLIC BLOOD PRESSURE: 107 MMHG | TEMPERATURE: 98.06 F | DIASTOLIC BLOOD PRESSURE: 59 MMHG | OXYGEN SATURATION: 98 % | HEART RATE: 58 BPM

## 2018-05-23 VITALS
TEMPERATURE: 97.7 F | OXYGEN SATURATION: 94 % | HEART RATE: 58 BPM | SYSTOLIC BLOOD PRESSURE: 106 MMHG | DIASTOLIC BLOOD PRESSURE: 59 MMHG

## 2018-05-23 VITALS — OXYGEN SATURATION: 94 %

## 2018-05-23 VITALS
TEMPERATURE: 97.52 F | SYSTOLIC BLOOD PRESSURE: 109 MMHG | HEART RATE: 62 BPM | DIASTOLIC BLOOD PRESSURE: 64 MMHG | OXYGEN SATURATION: 95 %

## 2018-05-23 VITALS — OXYGEN SATURATION: 92 %

## 2018-05-23 VITALS
DIASTOLIC BLOOD PRESSURE: 62 MMHG | OXYGEN SATURATION: 95 % | TEMPERATURE: 97.34 F | HEART RATE: 104 BPM | SYSTOLIC BLOOD PRESSURE: 109 MMHG

## 2018-05-23 VITALS — SYSTOLIC BLOOD PRESSURE: 109 MMHG | DIASTOLIC BLOOD PRESSURE: 62 MMHG | OXYGEN SATURATION: 95 % | HEART RATE: 104 BPM

## 2018-05-23 VITALS
DIASTOLIC BLOOD PRESSURE: 63 MMHG | TEMPERATURE: 98.06 F | HEART RATE: 54 BPM | OXYGEN SATURATION: 96 % | SYSTOLIC BLOOD PRESSURE: 99 MMHG

## 2018-05-23 VITALS — OXYGEN SATURATION: 97 %

## 2018-05-23 LAB
BASOPHILS # BLD: 0.02 K/UL (ref 0–0.2)
BASOPHILS NFR BLD: 0.4 %
BUN SERPL-MCNC: 64 MG/DL (ref 7–18)
CALCIUM SERPL-MCNC: 8.4 MG/DL (ref 8.5–10.1)
CO2 SERPL-SCNC: 28 MMOL/L (ref 21–32)
CREAT SERPL-MCNC: 3.02 MG/DL (ref 0.6–1.2)
EOS ABS #: 0.12 K/UL (ref 0–0.5)
EOSINOPHIL NFR BLD AUTO: 214 K/UL (ref 130–400)
GLUCOSE SERPL-MCNC: 85 MG/DL (ref 70–99)
HCT VFR BLD CALC: 28.8 % (ref 37–47)
HGB BLD-MCNC: 9.7 G/DL (ref 12–16)
IG#: 0.01 K/UL (ref 0–0.02)
IMM GRANULOCYTES NFR BLD AUTO: 18 %
INR PPP: 2.6 (ref 0.9–1.1)
LYMPHOCYTES # BLD: 1.03 K/UL (ref 1.2–3.4)
MCH RBC QN AUTO: 27.1 PG (ref 25–34)
MCHC RBC AUTO-ENTMCNC: 33.7 G/DL (ref 32–36)
MCV RBC AUTO: 80.4 FL (ref 80–100)
MONO ABS #: 0.75 K/UL (ref 0.11–0.59)
MONOCYTES NFR BLD: 13.1 %
NEUT ABS #: 3.78 K/UL (ref 1.4–6.5)
NEUTROPHILS # BLD AUTO: 2.1 %
NEUTROPHILS NFR BLD AUTO: 66.2 %
PMV BLD AUTO: 9.9 FL (ref 7.4–10.4)
POTASSIUM SERPL-SCNC: 3.9 MMOL/L (ref 3.5–5.1)
RED CELL DISTRIBUTION WIDTH CV: 18.7 % (ref 11.5–14.5)
RED CELL DISTRIBUTION WIDTH SD: 53.4 FL (ref 36.4–46.3)
SODIUM SERPL-SCNC: 133 MMOL/L (ref 136–145)
WBC # BLD AUTO: 5.71 K/UL (ref 4.8–10.8)

## 2018-05-23 RX ADMIN — ALBUMIN HUMAN SCH GM: 250 SOLUTION INTRAVENOUS at 09:41

## 2018-05-23 RX ADMIN — ATORVASTATIN CALCIUM SCH MG: 40 TABLET, FILM COATED ORAL at 08:18

## 2018-05-23 RX ADMIN — ALBUMIN HUMAN SCH GM: 250 SOLUTION INTRAVENOUS at 16:47

## 2018-05-23 RX ADMIN — FLUTICASONE PROPIONATE AND SALMETEROL SCH PUFF: 50; 100 POWDER RESPIRATORY (INHALATION) at 21:05

## 2018-05-23 RX ADMIN — METOPROLOL TARTRATE SCH MG: 25 TABLET, FILM COATED ORAL at 21:00

## 2018-05-23 RX ADMIN — ALBUMIN HUMAN SCH GM: 250 SOLUTION INTRAVENOUS at 21:06

## 2018-05-23 RX ADMIN — WARFARIN SODIUM SCH MG: 2.5 TABLET ORAL at 16:00

## 2018-05-23 RX ADMIN — AMIODARONE HYDROCHLORIDE SCH MG: 200 TABLET ORAL at 08:18

## 2018-05-23 RX ADMIN — METOPROLOL TARTRATE SCH MG: 25 TABLET, FILM COATED ORAL at 08:18

## 2018-05-23 RX ADMIN — ALBUMIN HUMAN SCH GM: 250 SOLUTION INTRAVENOUS at 18:11

## 2018-05-23 RX ADMIN — ALBUMIN HUMAN SCH GM: 250 SOLUTION INTRAVENOUS at 15:10

## 2018-05-23 RX ADMIN — ALBUMIN HUMAN SCH GM: 250 SOLUTION INTRAVENOUS at 08:42

## 2018-05-23 RX ADMIN — ALBUMIN HUMAN SCH GM: 250 SOLUTION INTRAVENOUS at 13:31

## 2018-05-23 RX ADMIN — ALBUMIN HUMAN SCH GM: 250 SOLUTION INTRAVENOUS at 22:19

## 2018-05-23 RX ADMIN — MIDODRINE HYDROCHLORIDE SCH MG: 2.5 TABLET ORAL at 16:42

## 2018-05-23 RX ADMIN — FLUTICASONE PROPIONATE AND SALMETEROL SCH PUFF: 50; 100 POWDER RESPIRATORY (INHALATION) at 08:17

## 2018-05-23 NOTE — GASTROENTEROLOGY PROGRESS NOTE
Progress Note


Date of Service:  May 23, 2018


Subjective


Pt evaluation today including:  conversation w/ patient, conversation w/ family

, physical exam, chart review, lab review


Pt is seen and evaluated, chart reviewed. Family at bedside. No acute events 

overnight. Offers no complaints other than foot pain. Feels okay but is 

requesting transfer to tertiary care center. Denies abd pain, nausea, vomiting, 

black/bloody stools. No fever, chills, CP, SOB.





Review of Systems


Constitutional:  No fever, No chills, No weight loss, No weakness


Respiratory:  No cough, No shortness of breath


Cardiac:  + edema, No chest pain


Abdomen:  No pain, No nausea, No vomiting, No diarrhea, No constipation, No GI 

bleeding, No dysphagia


Skin:  No rash, No itch, No color change, No bleeding





Medications





Current Inpatient Medications








 Medications


  (Trade)  Dose


 Ordered  Sig/Diego


 Route  Start Time


 Stop Time Status Last Admin


Dose Admin


 


 Tramadol HCl


  (Ultram Tab)  not


 relieved


 by tylenol @   Q6H  PRN


 PO  5/19/18 19:45


 6/18/18 19:44   


 


 


 Hydromorphone HCl


  (Dilaudid Inj)  0.5 mg  Q3H  PRN


 IV  5/19/18 19:45


 6/2/18 19:44   


 


 


 Miscellaneous


  (Iv Fluids


 Completed)  1 ea  PRN  PRN


 N/A  5/19/18 22:00


 5/19/19 21:59   


 


 


 Amiodarone HCl


  (Cordarone Tab)  200 mg  QAM


 PO  5/20/18 09:00


 6/19/18 08:59  5/22/18 07:44


200 MG


 


 Atorvastatin


 Calcium


  (Lipitor Tab)  40 mg  QAM


 PO  5/20/18 09:00


 6/19/18 08:59  5/23/18 08:18


40 MG


 


 Salmeterol


 Xinafoate/


 Fluticasone


  (Advair Diskus


 100/50 Inh)  1 puff  BID


 INH  5/20/18 09:00


 6/19/18 08:59  5/23/18 08:17


1 PUFF


 


 Fluticasone


 Propionate


  (Flonase Nasal


 Spray)  2 sprays  DAILY  PRN


 BLADIMIR  5/19/18 22:30


 6/18/18 22:29   


 


 


 Pantoprazole


 Sodium


  (Protonix Tab)  40 mg  DAILY  PRN


 PO  5/19/18 22:30


 6/18/18 22:29   


 


 


 Ranitidine HCl


  (zANTac TAB)  300 mg  HS  PRN


 PO  5/19/18 22:30


 6/18/18 22:29  5/23/18 02:48


300 MG


 


 Albuterol/


 Ipratropium


  (Duoneb)  3 ml  Q2H  PRN


 INH  5/20/18 01:00


 6/19/18 00:59   


 


 


 Acetaminophen


  (Tylenol Tab)  325 mg  Q4H  PRN


 PO  5/20/18 07:15


 6/18/18 19:44   


 


 


 Miconazole Nitrate


  (Desenex Powder)  1 appln  UD  PRN


 EXT  5/20/18 10:15


 6/19/18 10:14   


 


 


 Metoprolol


 Tartrate


  (Lopressor Tab)  12.5 mg  BID


 PO  5/21/18 21:00


 6/19/18 08:59  5/21/18 21:33


12.5 MG


 


 Albumin Human


  (Albumin 25%)  12.5 gm  QID


 IV  5/22/18 13:00


 5/24/18 17:14  5/23/18 08:42


12.5 GM


 


 Albumin Human


  (Albumin 25%)  12.5 gm  QID@1000,1400,1800,2200


 IV  5/22/18 14:00


 5/24/18 18:14  5/22/18 22:18


12.5 GM











Objective


Vital Signs











  Date Time  Temp Pulse Resp B/P (MAP) Pulse Ox O2 Delivery O2 Flow Rate FiO2


 


5/23/18 08:00     95 Room Air  


 


5/23/18 08:00 36.7 54 18 99/63 (75) 96 Room Air  


 


5/23/18 06:38 36.7 54 18 99/63 (75) 96 Room Air  


 


5/23/18 04:00     97 CPAP  


 


5/23/18 03:07 36.7 58 18 107/59 (75) 98 Room Air  


 


5/23/18 00:00     97 CPAP  


 


5/22/18 23:06 36.4 57 17 108/57 (74) 95 CPAP  


 


5/22/18 20:00     97 Room Air  


 


5/22/18 16:00     97 Nasal Cannula 1.5 


 


5/22/18 15:28 36.7 56 17 92/65 (74) 97 Nasal Cannula 1.5 


 


5/22/18 12:15      Room Air  


 


5/22/18 11:26 36.3 60 20 113/72 (86) 92 Room Air  











Physical Exam


General Appearance:  no apparent distress


Eyes:  PERRL


ENT:  hearing grossly normal


Neck:  supple, trachea midline


Respiratory/Chest:  lungs clear, no respiratory distress, no accessory muscle 

use


Cardiovascular:  regular rate, rhythm, no gallop, no JVD


Abdomen:  normal bowel sounds, non tender, soft, no organomegaly, no pulsatile 

mass


Neurologic/Psych:  alert, normal mood/affect, oriented x 3


Skin:  normal color, no jaundice, warm/dry, no rash





Laboratory Results





Last 24 Hours








Test


  5/23/18


05:11


 


White Blood Count 5.71 K/uL 


 


Red Blood Count 3.58 M/uL 


 


Hemoglobin 9.7 g/dL 


 


Hematocrit 28.8 % 


 


Mean Corpuscular Volume 80.4 fL 


 


Mean Corpuscular Hemoglobin 27.1 pg 


 


Mean Corpuscular Hemoglobin


Concent 33.7 g/dl 


 


 


Platelet Count 214 K/uL 


 


Mean Platelet Volume 9.9 fL 


 


Neutrophils (%) (Auto) 66.2 % 


 


Lymphocytes (%) (Auto) 18.0 % 


 


Monocytes (%) (Auto) 13.1 % 


 


Eosinophils (%) (Auto) 2.1 % 


 


Basophils (%) (Auto) 0.4 % 


 


Neutrophils # (Auto) 3.78 K/uL 


 


Lymphocytes # (Auto) 1.03 K/uL 


 


Monocytes # (Auto) 0.75 K/uL 


 


Eosinophils # (Auto) 0.12 K/uL 


 


Basophils # (Auto) 0.02 K/uL 


 


RDW Standard Deviation 53.4 fL 


 


RDW Coefficient of Variation 18.7 % 


 


Immature Granulocyte % (Auto) 0.2 % 


 


Immature Granulocyte # (Auto) 0.01 K/uL 


 


Prothrombin Time 26.3 SECONDS 


 


Prothromb Time International


Ratio 2.6 


 


 


Sodium Level 133 mmol/L 


 


Potassium Level 3.9 mmol/L 


 


Chloride Level 96 mmol/L 


 


Carbon Dioxide Level 28 mmol/L 


 


Anion Gap 9.0 mmol/L 


 


Blood Urea Nitrogen 64 mg/dl 


 


Creatinine 3.02 mg/dl 


 


Est Creatinine Clear Calc


Drug Dose 14.2 ml/min 


 


 


Estimated GFR (


American) 16.0 


 


 


Estimated GFR (Non-


American 13.8 


 


 


BUN/Creatinine Ratio 21.1 


 


Random Glucose 85 mg/dl 


 


Calcium Level 8.4 mg/dl 











Assessment and Plan


83 year old female history of HF, admitted after a fall w/ L 5th metatarsal 

fracture. She has DENY w/ cr bumped from 1.6 to 2.8, urine NA 6 concerning for 

HRS. US ABD w/ moderate ascites. Perhaps the ascites is secondary to right 

sided heart disease. Her CRT this AM 3, nephrology following





- Pt is requesting transfer


- Ascites


   - Non urgent diagnostic paracentesis to r/o SBP   


      - No more than 3L off given DENY


      - Continue albumin as prescribed


      - Will need INR < 2


      - Please send ascites fluid protein, albumin, cell count, cytology, 

culture


- DENY/HRS


   - Hold lasix


   - CRT 3, baseline 1.6


   - Daily BMP


   - Urine NA 6


   - Continue albumin 25% 12.5G BID


   - Consider Midodrine/Octreotide


- Low NA diet, less than 2G








- GI to watch peripherally. Please call with any questions or concerns. 





Attending attestation





I have seen, examined this patient, and agree with the findings and above by 

our mid-level provider LEONID Flanagan.


- Cr has worsened, Would suggest adding Midodrine 7.5 mg TID. 


- will discuss with Dr. Landis

## 2018-05-23 NOTE — CARDIOLOGY CONSULTATION
Cardiology Consultation


Date of Consultation:  May 23, 2018


History of Present Illness


Patient is a 83 year old female seen in cardiology consultation for the 

evaluation of right-sided heart failure.  Patient is well-known to our 

cardiology service having recently been seen in inpatient consultation by Dr. Márquez on 5/12/18.  She typically follows with Dr. Alvarez.  She has a history 

of atrial fibrillation and diastolic dysfunction as well as progressive renal 

insufficiency.


 





The patient had been admitted from 5/11/18 until 5/17/18.  The patient was 

diagnosed with multifactorial volume overload with diastolic heart failure 

cirrhosis and progressive renal insufficiency.  Her discharge diuretic dose was 

furosemide 160 mg twice per day.  Her discharge creatinine on 5/17/18 was 2.15.





She presented on 5/19 after only being at the hospital for 2 days after she 

fell after tripping on her rub at home and developed a metatarsal fracture.  

She was found to have continued volume overload.  Medications this admission 

included furosemide 80 mg IV plus albumin on 5/21/18 and metolazone.  Her 

creatinine however had worsened to 3 mg/dL and her diuretics are currently on 

hold.  Recent abdominal ultrasound revealed moderate ascites unchanged compared 

to 5/11/18.





An EKG stop and performed this admission, however on telemetry sinus rhythm is 

present.  An echocardiogram was performed yesterday 5/22/18 revealing mild 

concentric left ventricular hypertrophy normal left ventricular wall motion and 

normal LVEF with ejection fraction of 55-60%.  The right ventricular systolic 

function was noted to be reduced to a mild to moderate degree.  Mild to 

moderate tricuspid regurgitation was present with mild pulmonary hypertension, 

pulmonary artery systolic pressure was gotten to be 40 mmHg.  Mild perihepatic 

ascites was noted.





Past Medical/Surgical History


Problem List:


Medical Problems:


(1) Anticoagulated on warfarin


(2) Asthma


(3) Benign hypertension


(4) CHF (congestive heart failure)


(5) Chronic kidney disease (CKD), stage III (moderate)


(6) Cirrhosis of liver


(7) Dyslipidemia


(8) Foot fracture


(9) GERD (gastroesophageal reflux disease)


(10) Hiatal hernia


(11) CINDY on CPAP


(12) Paroxysmal atrial fibrillation


(13) s/p elective cardioversion


(14) Seasonal allergies


(15) Stage 4 chronic kidney disease


(16) Volume overload


Surgical Problems:


(1) H/O: hysterectomy


(2) S/P cataract surgery





History


Past Medical History: 


1.  Paroxysmal atrial fibrillation


2.  Diastolic dysfunction with normal left ventricular systolic function


3.  Hypertension


4.  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 

hypertension


5.  Obstructive sleep apnea on CPAP at home


6.  Right aortic arch


7.  Systemic





Past Surgical History: 


1.  Hysterectomy


2.  Upper endoscopy


3.  Cataract surgery





Social History:  


Denies alcohol tobacco or recreational drug use.





Family History: 


Noncontributory





Review Of Systems


A 10 point review of systems is reviewed and is negative with exception of that 

noted above.





Allergies


Coded Allergies:  


     Alcohol (Verified  Allergy, Mild, RASH, 5/19/18)


 WIPES OR INGESTED


     Cephalosporins (Verified  Allergy, Mild, RASH, 5/19/18)


     Codeine (Verified  Allergy, Mild, RASH, 5/19/18)


     Penicillins (Verified  Allergy, Mild, RASH, 5/19/18)


     Aspirin (Verified  Allergy, Unknown, RASH, 5/19/18)


     Levofloxacin (Verified  Allergy, Unknown, "BONE PAIN", 5/19/18)


     Sulfa Antibiotics (Verified  Allergy, Unknown, UNKNOWN, 5/19/18)





Medications





Reported Home Medications








 Medications  Dose


 Route/Sig


 Max Daily Dose Days Date Category Dose


Instructions


 


 Lasix


  (Furosemide) 80


 Mg Tab  160 Mg


 PO BID


   30 5/17/18 Rx 


 


 Lopressor


  (Metoprolol


 Tartrate) 25 Mg


 Tab  25 Mg


 PO TID


   30 5/17/18 Rx 


 


 Coumadin


  (Warfarin Sod) 5


 Mg Tab  0.5 Tab


 PO DAILY


   30 5/17/18 Rx 


 


 Spironolactone 25


 Mg Tab  0.5 Tab


 PO DAILY


    5/11/18 Reported 


 


 Voltaren 1% Top


 Gel (Diclofenac


 Sodium (Topical))


 1 % Gel  1 Appln


 TOP DAILY PRN


    5/11/18 Reported 


 


 Vitamin D 51968


 Unit


  (Ergocalciferol)


 50,000 Unit Cap  50,000 Unit


 PO WK


    3/20/18 Reported  Fridays


 


 Fluticasone


 Propionate 120


 Sprays/6000 Mcg


 Inha  2 Sprays


 BLADIMIR DAILY PRN


    1/28/18 Reported 


 


 Tramadol HCl 50


 Mg Tab  50 Mg


 PO BID PRN


    1/28/18 Reported 


 


 Lipitor


  (Atorvastatin


 Calcium) 40 Mg Tab  40 Mg


 PO QAM


    1/2/18 Reported 


 


 Acetaminophen 500


 Mg Tab  1,000 Mg


 PO Q8 PRN


    1/2/18 Reported 


 


 Proair Respiclick


  (Albuterol


 Sulfate) 108


 Mcg/Act Aer  2 Puffs


 INH Q4H PRN


    8/13/17 Reported 


 


 Advair Diskus


 100/50 60 Dose


  (Fluticasone


 Prop/Salmeterol)


 1 Ea Aerp  1 Puff


 INH BID


    8/12/17 Reported 


 


 Amiodarone HCl


 200 Mg Tab  200 Mg


 PO QAM


    6/21/16 Reported 


 


 Prilosec


  (Omeprazole) 20


 Mg Cap  20 Mg


 PO DAILY PRN


    11/9/15 Reported 


 


 Zantac


  (Ranitidine HCl)


 300 Mg Tab  300 Mg


 PO HS PRN


    6/11/15 Reported 











Physical Exam


Vital Signs (Last 8hrs):





Last 8 Hrs








  Date Time  Temp Pulse Resp B/P (MAP) Pulse Ox O2 Delivery O2 Flow Rate FiO2


 


5/23/18 16:00     94 Room Air  


 


5/23/18 15:24 36.5 58 18 106/59 (75) 94 Room Air  


 


5/23/18 12:00     94 Room Air  


 


5/23/18 11:30  104   95   


 


5/23/18 11:08 36.3 104 20 109/62 (78) 95 Room Air  








General Appearance: Chronically ill in appearance


Head: Normocephalic Atraumatic. 


Eyes: PERRLA, EOMI, conjunctiva and sclera clear


Neck:  Supple. No carotid bruits noted. No JVD. No HJD. 


Respiratory: Breath sounds decreased at bases


Cardiovascular: Distant heart sounds, regular rhythm


Abdomen: Distended consistent with ascites


Extremities: 2+ bilateral lower extremity edema


Neuro:  No focal deficits. 


Psychiatric: Normal affect.





Data





Last 24 Hours








Test


  5/23/18


05:11


 


White Blood Count 5.71 K/uL 


 


Red Blood Count 3.58 M/uL 


 


Hemoglobin 9.7 g/dL 


 


Hematocrit 28.8 % 


 


Mean Corpuscular Volume 80.4 fL 


 


Mean Corpuscular Hemoglobin 27.1 pg 


 


Mean Corpuscular Hemoglobin


Concent 33.7 g/dl 


 


 


Platelet Count 214 K/uL 


 


Mean Platelet Volume 9.9 fL 


 


Neutrophils (%) (Auto) 66.2 % 


 


Lymphocytes (%) (Auto) 18.0 % 


 


Monocytes (%) (Auto) 13.1 % 


 


Eosinophils (%) (Auto) 2.1 % 


 


Basophils (%) (Auto) 0.4 % 


 


Neutrophils # (Auto) 3.78 K/uL 


 


Lymphocytes # (Auto) 1.03 K/uL 


 


Monocytes # (Auto) 0.75 K/uL 


 


Eosinophils # (Auto) 0.12 K/uL 


 


Basophils # (Auto) 0.02 K/uL 


 


RDW Standard Deviation 53.4 fL 


 


RDW Coefficient of Variation 18.7 % 


 


Immature Granulocyte % (Auto) 0.2 % 


 


Immature Granulocyte # (Auto) 0.01 K/uL 


 


Prothrombin Time 26.3 SECONDS 


 


Prothromb Time International


Ratio 2.6 


 


 


Sodium Level 133 mmol/L 


 


Potassium Level 3.9 mmol/L 


 


Chloride Level 96 mmol/L 


 


Carbon Dioxide Level 28 mmol/L 


 


Anion Gap 9.0 mmol/L 


 


Blood Urea Nitrogen 64 mg/dl 


 


Creatinine 3.02 mg/dl 


 


Est Creatinine Clear Calc


Drug Dose 14.2 ml/min 


 


 


Estimated GFR (


American) 16.0 


 


 


Estimated GFR (Non-


American 13.8 


 


 


BUN/Creatinine Ratio 21.1 


 


Random Glucose 85 mg/dl 


 


Calcium Level 8.4 mg/dl 








Echocardiogram as noted





Assessment & Plan


Impression:


83-year-old female


1.  Multifactorial volume overload with left-sided diastolic dysfunction, right 

ventricular systolic dysfunction, cirrhosis, and progressive renal dysfunction.





Plan:


It appears the patient received aggressive diuretics along with albumin 

administration this hospital stay and her creatinine has worsened.  Agree with 

proceeding with paracentesis if felt to be clinically indicated by GI.  Patient 

remains on anticoagulation with warfarin.

## 2018-05-23 NOTE — PROGRESS NOTE
Medicine Progress Note


Date & Time of Visit:


May 23, 2018 at 11:40.


Subjective


seen resting in wheelchair, alert, oriented, comfortable


daughter Marii at bedside, visiting





states she feels fine today


no dyspnea, chest pain, abdominal pain


denies leg pain


has some Left foot pain with movement





Objective





Last 8 Hrs








  Date Time  Temp Pulse Resp B/P (MAP) Pulse Ox O2 Delivery O2 Flow Rate FiO2


 


5/23/18 08:00     95 Room Air  


 


5/23/18 08:00 36.7 54 18 99/63 (75) 96 Room Air  


 


5/23/18 06:38 36.7 54 18 99/63 (75) 96 Room Air  


 


5/23/18 04:00     97 CPAP  








Physical Exam:


General- oriented x 3, not in distress, speaks in sentences with no effort





Eyes- anicteric





Neck- no JVD





Lungs- clear breath sounds bilaterally, no rales/wheezes





Heart- regular rhythm; no murmur, normal rate





Abdomen- normal bowel sounds, soft, nontender, mild distention





Extremities- grade 2 lower leg edema- about the same as yesterday, no erythema/

warmth/tenderness, (+) dressing in place over blisters





Neuro- alert, oriented x 3; no gross focal neurologic deficits





Skin- warm & dry


Laboratory Results:





Last 24 Hours








Test


  5/23/18


05:11


 


White Blood Count 5.71 K/uL 


 


Red Blood Count 3.58 M/uL 


 


Hemoglobin 9.7 g/dL 


 


Hematocrit 28.8 % 


 


Mean Corpuscular Volume 80.4 fL 


 


Mean Corpuscular Hemoglobin 27.1 pg 


 


Mean Corpuscular Hemoglobin


Concent 33.7 g/dl 


 


 


Platelet Count 214 K/uL 


 


Mean Platelet Volume 9.9 fL 


 


Neutrophils (%) (Auto) 66.2 % 


 


Lymphocytes (%) (Auto) 18.0 % 


 


Monocytes (%) (Auto) 13.1 % 


 


Eosinophils (%) (Auto) 2.1 % 


 


Basophils (%) (Auto) 0.4 % 


 


Neutrophils # (Auto) 3.78 K/uL 


 


Lymphocytes # (Auto) 1.03 K/uL 


 


Monocytes # (Auto) 0.75 K/uL 


 


Eosinophils # (Auto) 0.12 K/uL 


 


Basophils # (Auto) 0.02 K/uL 


 


RDW Standard Deviation 53.4 fL 


 


RDW Coefficient of Variation 18.7 % 


 


Immature Granulocyte % (Auto) 0.2 % 


 


Immature Granulocyte # (Auto) 0.01 K/uL 


 


Prothrombin Time 26.3 SECONDS 


 


Prothromb Time International


Ratio 2.6 


 


 


Sodium Level 133 mmol/L 


 


Potassium Level 3.9 mmol/L 


 


Chloride Level 96 mmol/L 


 


Carbon Dioxide Level 28 mmol/L 


 


Anion Gap 9.0 mmol/L 


 


Blood Urea Nitrogen 64 mg/dl 


 


Creatinine 3.02 mg/dl 


 


Est Creatinine Clear Calc


Drug Dose 14.2 ml/min 


 


 


Estimated GFR (


American) 16.0 


 


 


Estimated GFR (Non-


American 13.8 


 


 


BUN/Creatinine Ratio 21.1 


 


Random Glucose 85 mg/dl 


 


Calcium Level 8.4 mg/dl 











Assessment & Plan


BILATERAL LOWER LEG EDEMA LIKELY SECONDARY TO:


ACUTE ON CHRONIC DIASTOLIC CHF, RIGHT SIDED HEART FAILURE


HISTORY OF LIVER CIRRHOSIS


Weight gain, bilateral leg swelling/seepage despite compliance with home 

diuretic Rx


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 

ultrasound 





-- Started Lasix 80mg IV + Albumin q12h on hospital day 1


    Creatinine  further increased from 2.1 to 2.6


    Poor urine output, Still had significant lower leg edema bilaterally


-- Lasix held 


    given increased Albumin IV


-- crea still increased to 2.8


    Albumin increased to q6h


 -- 5/23:


   crea further increased to 3.0


   intravascularly volumed depleted


   continue Albumin IV q6h


   monitor response


   discussed with Dr. Landis


   echo showing right sided heart failure- will consult Cardiology





NAFLD CIRRHOSIS


repeat US: moderate ascites


GI consulted


possible hepatorenal syndrome?


HOLD off on diagnostic paracentesis per GI





ACUTE RENAL FAILURE ON CKD III


Serum creatinine 2.01 at time of admission.


Nephrology consulted


Crea increased from 2.1 to 3.0


management as noted above





Paroxysmal atrial fibrillation


On Coumadin


-- Episodic bradycardia since increase in beta-blocker frequency adjustment 

from recent confinement


-- Metoprolol reduced to 25mg BID from TID   


    Heart rate still in the mid 50s


    Further reduce metoprolol to 12.5 mg p.o. twice daily


     Monitor





-- INR 2.6


   coumadin 2.5 mg po daily


    INR daily





LEFT FOOT METATARSAL FRACTURES


s/p Fall, Mechanical


Foot Xray


1.  Acute left fifth metatarsal fracture 


2.  Subacute fractures of her second and third metatarsals.


-- Ortho consulted


   non weightbearing x 2 weeks


   ff up with Ortho


--Patient reporting of left hip pain, now resolved


   x-rays for the hip, femur, knee, tibia-fibula: no fracture





HTN


stable 


Reduced the metoprolol dose for low HR


Monitor closely





Chronic hyponatremia secondary to chronic liver disease.


- stable





Chronic anemia 2 to CKD


-Monitor hemoglobin





DVT prophylaxis


coumadin





DNR. 





DISPOSITION


Management of volume overload in progress


Discussed with case management


Transition to rehab/skilled nursing facility when medically stable








discussed case at length with patient's relative Dr. Ezequiel Nielsen on the phone- 

he agrees with present management


also had a long discussion with patient and her daughter  today- explained 

ongoing medical conditions and plan of care


they are agreeable and comfortable with plan of care, would like to stay 

admitted to our hospital





RUSS Villa MD


Current Inpatient Medications:





Current Inpatient Medications








 Medications


  (Trade)  Dose


 Ordered  Sig/Diego


 Route  Start Time


 Stop Time Status Last Admin


Dose Admin


 


 Tramadol HCl


  (Ultram Tab)  not


 relieved


 by tylenol @   Q6H  PRN


 PO  5/19/18 19:45


 6/18/18 19:44   


 


 


 Hydromorphone HCl


  (Dilaudid Inj)  0.5 mg  Q3H  PRN


 IV  5/19/18 19:45


 6/2/18 19:44   


 


 


 Miscellaneous


  (Iv Fluids


 Completed)  1 ea  PRN  PRN


 N/A  5/19/18 22:00


 5/19/19 21:59   


 


 


 Amiodarone HCl


  (Cordarone Tab)  200 mg  QAM


 PO  5/20/18 09:00


 6/19/18 08:59  5/22/18 07:44


200 MG


 


 Atorvastatin


 Calcium


  (Lipitor Tab)  40 mg  QAM


 PO  5/20/18 09:00


 6/19/18 08:59  5/23/18 08:18


40 MG


 


 Salmeterol


 Xinafoate/


 Fluticasone


  (Advair Diskus


 100/50 Inh)  1 puff  BID


 INH  5/20/18 09:00


 6/19/18 08:59  5/23/18 08:17


1 PUFF


 


 Fluticasone


 Propionate


  (Flonase Nasal


 Spray)  2 sprays  DAILY  PRN


 BLADIMIR  5/19/18 22:30


 6/18/18 22:29   


 


 


 Pantoprazole


 Sodium


  (Protonix Tab)  40 mg  DAILY  PRN


 PO  5/19/18 22:30


 6/18/18 22:29   


 


 


 Ranitidine HCl


  (zANTac TAB)  300 mg  HS  PRN


 PO  5/19/18 22:30


 6/18/18 22:29  5/23/18 02:48


300 MG


 


 Albuterol/


 Ipratropium


  (Duoneb)  3 ml  Q2H  PRN


 INH  5/20/18 01:00


 6/19/18 00:59   


 


 


 Acetaminophen


  (Tylenol Tab)  325 mg  Q4H  PRN


 PO  5/20/18 07:15


 6/18/18 19:44   


 


 


 Miconazole Nitrate


  (Desenex Powder)  1 appln  UD  PRN


 EXT  5/20/18 10:15


 6/19/18 10:14   


 


 


 Metoprolol


 Tartrate


  (Lopressor Tab)  12.5 mg  BID


 PO  5/21/18 21:00


 6/19/18 08:59  5/21/18 21:33


12.5 MG


 


 Albumin Human


  (Albumin 25%)  12.5 gm  QID


 IV  5/22/18 13:00


 5/24/18 17:14  5/23/18 08:42


12.5 GM


 


 Albumin Human


  (Albumin 25%)  12.5 gm  QID@1000,1400,1800,2200


 IV  5/22/18 14:00


 5/24/18 18:14  5/23/18 09:41


12.5 GM

## 2018-05-24 VITALS — OXYGEN SATURATION: 94 %

## 2018-05-24 VITALS
HEART RATE: 55 BPM | SYSTOLIC BLOOD PRESSURE: 111 MMHG | OXYGEN SATURATION: 97 % | TEMPERATURE: 97.34 F | DIASTOLIC BLOOD PRESSURE: 67 MMHG

## 2018-05-24 VITALS
SYSTOLIC BLOOD PRESSURE: 115 MMHG | DIASTOLIC BLOOD PRESSURE: 64 MMHG | TEMPERATURE: 97.52 F | OXYGEN SATURATION: 90 % | HEART RATE: 57 BPM

## 2018-05-24 VITALS
SYSTOLIC BLOOD PRESSURE: 99 MMHG | DIASTOLIC BLOOD PRESSURE: 60 MMHG | HEART RATE: 58 BPM | OXYGEN SATURATION: 94 % | TEMPERATURE: 97.7 F

## 2018-05-24 VITALS — OXYGEN SATURATION: 96 %

## 2018-05-24 VITALS — OXYGEN SATURATION: 95 %

## 2018-05-24 VITALS
DIASTOLIC BLOOD PRESSURE: 75 MMHG | OXYGEN SATURATION: 96 % | TEMPERATURE: 98.6 F | HEART RATE: 61 BPM | SYSTOLIC BLOOD PRESSURE: 135 MMHG

## 2018-05-24 VITALS
OXYGEN SATURATION: 95 % | HEART RATE: 52 BPM | DIASTOLIC BLOOD PRESSURE: 63 MMHG | SYSTOLIC BLOOD PRESSURE: 98 MMHG | TEMPERATURE: 97.52 F

## 2018-05-24 LAB
BUN SERPL-MCNC: 70 MG/DL (ref 7–18)
CALCIUM SERPL-MCNC: 8.8 MG/DL (ref 8.5–10.1)
CO2 SERPL-SCNC: 27 MMOL/L (ref 21–32)
CREAT SERPL-MCNC: 3.1 MG/DL (ref 0.6–1.2)
GLUCOSE SERPL-MCNC: 78 MG/DL (ref 70–99)
INR PPP: 2 (ref 0.9–1.1)
POTASSIUM SERPL-SCNC: 4.5 MMOL/L (ref 3.5–5.1)
SODIUM SERPL-SCNC: 132 MMOL/L (ref 136–145)

## 2018-05-24 RX ADMIN — MIDODRINE HYDROCHLORIDE SCH MG: 2.5 TABLET ORAL at 16:38

## 2018-05-24 RX ADMIN — ALBUMIN HUMAN SCH GM: 250 SOLUTION INTRAVENOUS at 17:50

## 2018-05-24 RX ADMIN — ATORVASTATIN CALCIUM SCH MG: 40 TABLET, FILM COATED ORAL at 08:11

## 2018-05-24 RX ADMIN — ALBUMIN HUMAN SCH GM: 250 SOLUTION INTRAVENOUS at 16:36

## 2018-05-24 RX ADMIN — FLUTICASONE PROPIONATE AND SALMETEROL SCH PUFF: 50; 100 POWDER RESPIRATORY (INHALATION) at 19:44

## 2018-05-24 RX ADMIN — ALBUMIN HUMAN SCH GM: 250 SOLUTION INTRAVENOUS at 09:58

## 2018-05-24 RX ADMIN — ALBUMIN HUMAN SCH GM: 250 SOLUTION INTRAVENOUS at 13:07

## 2018-05-24 RX ADMIN — ALBUMIN HUMAN SCH GM: 250 SOLUTION INTRAVENOUS at 08:13

## 2018-05-24 RX ADMIN — WARFARIN SODIUM SCH MG: 2.5 TABLET ORAL at 16:38

## 2018-05-24 RX ADMIN — METOPROLOL TARTRATE SCH MG: 25 TABLET, FILM COATED ORAL at 19:45

## 2018-05-24 RX ADMIN — METOPROLOL TARTRATE SCH MG: 25 TABLET, FILM COATED ORAL at 08:11

## 2018-05-24 RX ADMIN — MIDODRINE HYDROCHLORIDE SCH MG: 2.5 TABLET ORAL at 08:12

## 2018-05-24 RX ADMIN — AMIODARONE HYDROCHLORIDE SCH MG: 200 TABLET ORAL at 08:11

## 2018-05-24 RX ADMIN — FLUTICASONE PROPIONATE AND SALMETEROL SCH PUFF: 50; 100 POWDER RESPIRATORY (INHALATION) at 08:12

## 2018-05-24 RX ADMIN — ALBUMIN HUMAN SCH GM: 250 SOLUTION INTRAVENOUS at 14:24

## 2018-05-24 RX ADMIN — MIDODRINE HYDROCHLORIDE SCH MG: 2.5 TABLET ORAL at 11:30

## 2018-05-24 NOTE — NEPHROLOGY PROGRESS NOTE
Nephrology Progress Note


Date of Service:


May 24, 2018.


Subjective


84 yo female with ckd, right sided heart failure, cirrhosis with worsening 

kidney function in the setting of diuresis.  continues to have significant 

edema in legs.  family in the room.  pts creatinine trending up.  urine sodium 

continues to be low.





Objective











  Date Time  Temp Pulse Resp B/P (MAP) Pulse Ox O2 Delivery O2 Flow Rate FiO2


 


5/24/18 15:00 36.4 57 22 115/64 (81) 90 Room Air  


 


5/24/18 12:00     95 Room Air  


 


5/24/18 10:50 36.3 55 20 111/67 (82) 97 Room Air  


 


5/24/18 08:00     95 Room Air  


 


5/24/18 05:53 36.4 52 13 98/63 (75) 95 Room Air  


 


5/24/18 04:00     94 Room Air  


 


5/24/18 03:44 36.5 58 17 99/60 (73) 94 Room Air  


 


5/24/18 00:01     94 CPAP  


 


5/23/18 23:21 36.4 62 18 109/64 (79) 95 Room Air  


 


5/23/18 20:00     92 Room Air  


 


5/23/18 18:54 36.8 58 18 117/69 (85) 92 Room Air  


 


5/23/18 16:00     94 Room Air  








Physical Exam:


General-aaox3, obese


Eyes-no scleral icterus


ENT-mmm


Neck-supple


Lungs-clear


Heart-bradycardia


Abdomen-bs+, pannus 


Extremities-+2 edema


Neuro-nonfocal





Current Inpatient Medications








 Medications


  (Trade)  Dose


 Ordered  Sig/Diego


 Route  Start Time


 Stop Time Status Last Admin


Dose Admin


 


 Tramadol HCl


  (Ultram Tab)  not


 relieved


 by tylenol @   Q6H  PRN


 PO  5/19/18 19:45


 6/18/18 19:44   


 


 


 Hydromorphone HCl


  (Dilaudid Inj)  0.5 mg  Q3H  PRN


 IV  5/19/18 19:45


 6/2/18 19:44   


 


 


 Miscellaneous


  (Iv Fluids


 Completed)  1 ea  PRN  PRN


 N/A  5/19/18 22:00


 5/19/19 21:59   


 


 


 Amiodarone HCl


  (Cordarone Tab)  200 mg  QAM


 PO  5/20/18 09:00


 6/19/18 08:59  5/22/18 07:44


200 MG


 


 Atorvastatin


 Calcium


  (Lipitor Tab)  40 mg  QAM


 PO  5/20/18 09:00


 6/19/18 08:59  5/24/18 08:11


40 MG


 


 Salmeterol


 Xinafoate/


 Fluticasone


  (Advair Diskus


 100/50 Inh)  1 puff  BID


 INH  5/20/18 09:00


 6/19/18 08:59  5/24/18 08:12


1 PUFF


 


 Fluticasone


 Propionate


  (Flonase Nasal


 Spray)  2 sprays  DAILY  PRN


 BLADIMIR  5/19/18 22:30


 6/18/18 22:29   


 


 


 Pantoprazole


 Sodium


  (Protonix Tab)  40 mg  DAILY  PRN


 PO  5/19/18 22:30


 6/18/18 22:29   


 


 


 Ranitidine HCl


  (zANTac TAB)  300 mg  HS  PRN


 PO  5/19/18 22:30


 6/18/18 22:29  5/23/18 02:48


300 MG


 


 Albuterol/


 Ipratropium


  (Duoneb)  3 ml  Q2H  PRN


 INH  5/20/18 01:00


 6/19/18 00:59   


 


 


 Acetaminophen


  (Tylenol Tab)  325 mg  Q4H  PRN


 PO  5/20/18 07:15


 6/18/18 19:44   


 


 


 Miconazole Nitrate


  (Desenex Powder)  1 appln  UD  PRN


 EXT  5/20/18 10:15


 6/19/18 10:14   


 


 


 Metoprolol


 Tartrate


  (Lopressor Tab)  12.5 mg  BID


 PO  5/21/18 21:00


 6/19/18 08:59  5/21/18 21:33


12.5 MG


 


 Albumin Human


  (Albumin 25%)  12.5 gm  QID


 IV  5/22/18 13:00


 5/24/18 17:14  5/24/18 13:07


12.5 GM


 


 Albumin Human


  (Albumin 25%)  12.5 gm  QID@1000,1400,1800,2200


 IV  5/22/18 14:00


 5/24/18 18:14  5/24/18 14:24


12.5 GM


 


 Warfarin Sodium


  (Coumadin Tab)  2.5 mg  DAILY@16


 PO  5/23/18 16:00


 6/22/18 15:59   


 


 


 Midodrine


  (Proamatine Tab)  5 mg  TID@08,12,17


 PO  5/23/18 17:00


 6/22/18 16:59  5/24/18 11:30


5 MG











Last 24 Hours








Test


  5/24/18


05:22 5/24/18


11:20


 


Prothrombin Time 20.5 SECONDS  


 


Prothromb Time International


Ratio 2.0 


  


 


 


Sodium Level 132 mmol/L  


 


Potassium Level 4.5 mmol/L  


 


Chloride Level 96 mmol/L  


 


Carbon Dioxide Level 27 mmol/L  


 


Anion Gap 9.0 mmol/L  


 


Blood Urea Nitrogen 70 mg/dl  


 


Creatinine 3.10 mg/dl  


 


Est Creatinine Clear Calc


Drug Dose 14.0 ml/min 


  


 


 


Estimated GFR (


American) 15.5 


  


 


 


Estimated GFR (Non-


American 13.3 


  


 


 


BUN/Creatinine Ratio 22.6  


 


Random Glucose 78 mg/dl  


 


Calcium Level 8.8 mg/dl  


 


Urine Random Sodium  8 mEq/L 











Assessment & Plan


DENY-oliguric-low urine sodium in the setting of cirrhosis despite aggressive 

albumin resuscitation. may have element of hepatorenal syndrome. will give 

octreotide. already on midodrine.  pt does want dialysis if needed.

## 2018-05-24 NOTE — DISCHARGE INSTRUCTIONS
Discharge Instructions


Date of Service


May 24, 2018.





Admission


Reason for Admission:  Foot Fracture





Discharge


Discharge Diagnosis / Problem:  foot fracture. hepato renal syndrome





Discharge Goals


Goal(s):  Decrease discomfort





Activity Recommendations


Activity Level:  Assistance Required





.





Additional Information


Patient informed of condition:  Yes


Advance Directives:  Yes


DNR:  Yes


Level of Care:  Other (transfering to [facility])


Communicable Disease:  No


Prognosis:  Stable


Tavarez Catheter:  Yes





Instructions / Follow-Up


Instructions / Follow-Up


FOLLOWUP AS PER [facility] RECOMMENDATIONS





Current Hospital Diet


Patient's current hospital diet: AHA Diet (Heart Healthy), Low Sodium Diet (2gm 

Na)





Discharge Diet


Recommended Diet:  AHA Diet (Heart Healthy), Low Sodium Diet (2gm Na), Renal 

Diet





Pending Studies


Studies pending at discharge:  no





Physician Orders On Transfer


Special Precautions:


FALL AND ASPIRATION PRECAUTIONS


IV Therapy:


IV ALBUMIN


Vital Signs:


EVERY 8HRS


Additional Orders:


PLEASE CHECK MEDICATION RECONCILIATION FOR ACCURATE MEDICATION LIST





Medical Emergencies








.


Who to Call and When:





Medical Emergencies:  If at any time you feel your situation is an emergency, 

please call 911 immediately.





.





Non-Emergent Contact


Non-Emergency issues call your:  Primary Care Provider





.


.








"Provider Documentation" section prepared by Mayco Butterfield.








.





Consultant Recommendations


Consultant Recommendations:


NWB Left lower extremity


Boot on left ankle when out of bed, okay to remove when out of bed.


Allowed for ankle and toe range of motion.


Ice and elevation left leg as needed for pain/swelling.


Follow up with Dr. Shaikh on 6/5/18 at 8:45 a.m.


Please call [phone] with any questions or concerns or need to reschedule 

appointment.





Core Measure Problem


Core Measures:  None

## 2018-05-24 NOTE — CARDIOLOGY FOLLOW-UP
Subjective


General


Date of Service:


May 24, 2018.


Chief Complaint:  follow up edema


Pt evaluation today including:  conversation w/ patient, physical exam





History of Present Illness


The patient is a 83 year old female seen in follow up. Patient stood in room 

with assistance of PT.





Allergies


Coded Allergies:  


     Alcohol (Verified  Allergy, Mild, RASH, 5/19/18)


 WIPES OR INGESTED


     Cephalosporins (Verified  Allergy, Mild, RASH, 5/19/18)


     Codeine (Verified  Allergy, Mild, RASH, 5/19/18)


     Penicillins (Verified  Allergy, Mild, RASH, 5/19/18)


     Aspirin (Verified  Allergy, Unknown, RASH, 5/19/18)


     Levofloxacin (Verified  Allergy, Unknown, "BONE PAIN", 5/19/18)


     Sulfa Antibiotics (Verified  Allergy, Unknown, UNKNOWN, 5/19/18)





Social History


Smoking Status:  Never Smoker


Hx Tobacco Use In Past Year?:  No


Hx Alcohol Use - Type And Amou:  No


Hx Substance Use - Type And Am:  No





Problem List


Medical Problems:


(1) Acute electrocardiogram changes


Status: Acute  





(2) Ambulatory dysfunction


Status: Acute  





(3) CHF exacerbation


Status: Acute  





(4) Closed fibular fracture


Status: Acute  





(5) Closed fracture of neck of fibula


Status: Acute  





(6) Fever


Status: Acute  





(7) Hemoptysis


Status: Acute  





(8) Knee effusion, left


Status: Acute  





(9) Knee hemarthrosis, left


Status: Acute  





(10) Left fibular fracture


Status: Acute  





(11) Left knee pain


Status: Acute  





(12) Right hip pain


Status: Acute  





(13) SIRS (systemic inflammatory response syndrome)


Status: Acute  





(14) Supratherapeutic INR


Status: Acute  





(15) UTI (urinary tract infection)


Status: Acute  











Physical Exam


Vital Signs





Last Vital Signs Documentation








  Date Time  Temp Pulse Resp B/P (MAP) Pulse Ox O2 Delivery O2 Flow Rate FiO2


 


5/24/18 10:50 36.3 55 20 111/67 (82) 97 Room Air  


 


5/22/18 16:00       1.5 











Physical Exam


Constitutional:  


   Level of Distress:  NAD, chronically ill


Neck:  supple


Lungs:  


   Auscultation:  no wheezing, no rales/crackles, no rhonchi


Cardiovascular:  


   Heart Auscultation:  RRR, pertinent finding (1/6 sm)


Abdomen:  


   Inspection & Palpation:  pertinent finding (+ findings consistent with 

ascites)


Extremities:  pertinent finding (1-2+ LE edema)





Assessment and Plan


Assessment and Plan


Impression:


83-year-old female


1.  Multifactorial volume overload with left-sided diastolic dysfunction, right 

ventricular systolic dysfunction, cirrhosis, and progressive renal dysfunction.





Plan:


Continue albumin. Holding diuretics.





Laboratory Results





Last 24 Hours








Test


  5/24/18


05:22 5/24/18


11:20


 


Prothrombin Time 20.5 SECONDS  


 


Prothromb Time International


Ratio 2.0 


  


 


 


Sodium Level 132 mmol/L  


 


Potassium Level 4.5 mmol/L  


 


Chloride Level 96 mmol/L  


 


Carbon Dioxide Level 27 mmol/L  


 


Anion Gap 9.0 mmol/L  


 


Blood Urea Nitrogen 70 mg/dl  


 


Creatinine 3.10 mg/dl  


 


Est Creatinine Clear Calc


Drug Dose 14.0 ml/min 


  


 


 


Estimated GFR (


American) 15.5 


  


 


 


Estimated GFR (Non-


American 13.3 


  


 


 


BUN/Creatinine Ratio 22.6  


 


Random Glucose 78 mg/dl  


 


Calcium Level 8.8 mg/dl  


 


Urine Random Sodium  8 mEq/L

## 2018-05-24 NOTE — DISCHARGE SUMMARY
Discharge Summary


Date of Service


May 24, 2018.





Discharge Summary


Admission Date:


May 19, 2018 at 22:28


Discharge Date:  May 24, 2018


Discharge Disposition:  Acute care facility ([facility])


Principal Diagnosis:


HEPATO RENAL SYNDROME


LEFT FIFTH METATARSAL FRACTURE


Secondary Diagnoses/Problems:


for chronic diastolic heart failure, EF of


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


hyponatremia, PAFib on Coumadin, chronic anemia (baseline hemoglobin of 11),


CRI (baseline creatinine of  1.7-1.9 as per outpatient


Procedures:


CT HEAD:


No significant change compared to the prior study. No definite acute


intracranial abnormality. Motion artifact. 





LEFT FOOT XRAY:


1. An acute nondisplaced fracture at the base of the fifth metatarsal.


2. Healing fractures at the second and third metatarsals.


3. Sclerosis within the posterior calcaneus which may represent an additional


healing/healed fracture.





CHEST XRAY:


1. Cardiomegaly and hiatal hernia


2. Superior mediastinal widening. Probable right aortic arch.


3. Small bilateral pleural effusions. Stable mild interstitial thickening/edema








LEFT HIP/FEMUR/KNEE/TIBIA/FIBULA XRAY:


1. No acute fracture or dislocation within the left hip, left femur, left knee,


left lower leg, or left ankle.


2. Left fifth metatarsal fracture is better appreciated on the recent left foot


radiograph.


3. Diffuse soft tissue edema throughout the left lower extremity. 





ABDOMEN US:


Moderate ascites, unchanged. 





ECHO:


There is mild concentric left ventricular hypertrophy.


  *  The left ventricular wall motion is normal.


  *  Ejection Fraction = 55-60%.


  *  The right ventricle is moderately dilated.


  *  The right ventricular systolic function is mild to moderately reduced.


  *  The left atrium is severely dilated.


  *  The right atrium is moderately dilated.


  *  There is mild mitral regurgitation.


  *  There is mild to moderate tricuspid regurgitation.


  *  Mild pulmonary hypertension is present.


  *  The PA systolic pressure is calculated to be 40 mm Hg.


  *  Perihepatic ascites is noted.


Consultations:


NEPHROLOGY


GI


CARDIOLOGY





Medication Reconciliation


New Medications:  


Albumin, Human (Albumin Human) 25 % Inj


12.5 GM IV QID@1000,1400,1800,2200 for 5 Days





Metoprolol Tartrate (Lopressor) 25 Mg Tab


12.5 MG PO BID, #30 TAB





Midodrine (Midodrine HCl) 2.5 Mg Tab


5 MG PO TID@08,12,17, #30 TAB





Octreotide Acetate (Octreotide Acetate) 100 Mcg/Ml Inj


100 MCG SQ Q8H for 5 Days





Tramadol HCl (Tramadol HCl) 50 Mg Tab


25-50 MG PO Q6H PRN for Pain, #30 TAB





 


Continued Medications:  


Albuterol Sulfate (Proair Respiclick) 108 Mcg/Act Aer


2 PUFFS INH Q4H PRN for SOB/Wheezing





Amiodarone HCl (Amiodarone HCl) 200 Mg Tab


200 MG PO QAM





Atorvastatin (Lipitor) 40 Mg Tab


40 MG PO QAM





Ergocalciferol (Vitamin D 75357 Unit) 50,000 Unit Cap


13898 UNIT PO WK


Fridays


Fluticasone Prop/Salmeterol (Advair Diskus 100/50 60 Dose) 1 Ea Aerp


1 PUFF INH BID





Fluticasone Propionate (Fluticasone Propionate) 120 Sprays/6000 Mcg Inha


2 SPRAYS BLADIMIR DAILY PRN for Allergy Symptoms





Omeprazole (Prilosec) 20 Mg Cap


20 MG PO DAILY PRN for Acid Reflux, CAP





Ranitidine (Zantac) 300 Mg Tab


300 MG PO HS PRN for Heart Burn/Acid Reflux, TAB





 


Discontinued Medications:  


Acetaminophen (Acetaminophen) 500 Mg Tab


1000 MG PO Q8 PRN for Pain or Fever, TAB





Diclofenac Sodium (Topical) (Voltaren 1% Top Gel) 1 % Gel


1 APPLN TOP DAILY PRN for n





Furosemide (Lasix) 80 Mg Tab


160 MG PO BID for 30 Days, #120 TAB 2 Refills





Metoprolol Tartrate (Lopressor) 25 Mg Tab


25 MG PO TID for 30 Days, #90 TAB 2 Refills





Spironolactone (Spironolactone) 25 Mg Tab


0.5 TAB PO DAILY





Tramadol HCl (Tramadol HCl) 50 Mg Tab


50 MG PO BID PRN for Pain





Warfarin Sod (Coumadin) 5 Mg Tab


0.5 TAB PO DAILY for 30 Days











Admission Information


HPI (per Admitting provider):


History obtained from patient, family, and records. 





Medical history significant for chronic diastolic heart failure, EF of


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


hyponatremia, PAFib on Coumadin, chronic anemia (baseline hemoglobin of 11),


CRI (baseline creatinine of  1.7-1.9 as per outpatient Nephrology records).  





Recent confinement 05/11/2018 to 05/17/2018, for mild volume overload, 

decompensated heart failure.


Patient sent home on Lasix medication and increased metoprolol dosing for sinus 

tachycardia during confinement.  





At home, legs more swollen, fluid seeping as per family. 


Patient denies chest pain or shortness of breath.  


Unsure about weight gain.  





Patient was seen at the ER this morning after tripping over the bathroom rug 

causing patient to fall,


Patient noted achy left foot pain. 


No head trauma.  No syncope.


Family worried about patient going home.


Physical Exam (per Admitting):


VITAL SIGNS:  Blood pressure was noted to be 110/74, pulse rate 62, RR 18,


temperature 37, sats 94 on room air.


GENERAL:  Obese, slightly uncomfortable, no overt respiratory distress, although


occasionally has to stop talking to catch her breath.


SKIN:  Pallor, warm.


HEENT:  Pale palpebral conjunctivae.  No ptosis.  Dry mucosa.


NECK:  Short, supple.


CHEST:  Decreased effort.  No tenderness.


HEART:  Regular rate and rhythm, no murmur.


ABDOMEN:  Some distention, fluid wave.


EXTREMITIES:  Bilateral LE edema.  No LE tenderness,  no other gross


deformities.


NEUROLOGIC:  Coherent.  No facial asymmetry, no gross focality.





Hospital Course


BILATERAL LOWER LEG EDEMA LIKELY SECONDARY TO:


ACUTE ON CHRONIC DIASTOLIC CHF, RIGHT SIDED HEART FAILURE


HISTORY OF LIVER CIRRHOSIS, DENY on CKD stage 4


Weight gain, bilateral leg swelling/seepage despite compliance with home 

diuretic Rx


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 

ultrasound 





Was initially  Started Lasix 80mg IV + Albumin q12h on hospital day 1


    Creatinine  further increased from 2.1 to 2.6


    Poor urine output, Still had significant lower leg edema bilaterally


 Lasix held 


    given increased Albumin IV


 crea still increased to 2.8


    Albumin increased to q6h


  5/23:


   crea further increased to 3.0


   intravascularly volumed depleted


   continued Albumin IV q6h


      echo showing right sided heart failure- will consulted Cardiology


5/24


cr 3.1 today


started on midodrine and octreotide for hepato renal syndrome as per GI and 

Nephrology recommendation


family concerned about worsening patient status and wanted to Transfer to 

[facility]








NAFLD CIRRHOSIS


repeat US: moderate ascites


GI consulted


possible hepatorenal syndrome?


plan for paracentesis? when INR below 2.0





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


Serum creatinine 2.01 at time of admission.


Nephrology consulted


Crea increased from 2.1 to 3.1


management as noted above


Transferring To [facility]





Paroxysmal atrial fibrillation


On Coumadin


 Episodic bradycardia since increase in beta-blocker frequency adjustment from 

recent confinement


 Metoprolol reduced to 25mg BID from TID   


   Heart ratewas  still in the mid 50s


    Further reduce metoprolol to 12.5 mg p.o. twice daily


     Monitor


Coumadin on hold for planned paracentesis


INR 2.0 today





LEFT FOOT METATARSAL FRACTURES


s/p Fall, Mechanical


Foot Xray


1.  Acute left fifth metatarsal fracture 


2.  Subacute fractures of her second and third metatarsals.


-- Ortho consulted


   non weightbearing x 2 weeks


   ff up with Ortho








HTN


stable 


Reduced the metoprolol dose for low HR


Monitor closely





Chronic hyponatremia secondary to chronic liver disease.


 stable


na 132 today





Chronic anemia 2 to CKD


hb 9.7 today





DVT prophylaxis


coumadin





DNR. 








DISPOSITION


Transferring To [facility]


Total time spent on discharge = 50MINUTES


This includes examination of the patient, discharge planning, medication 

reconciliation, and communication with other providers.





Discharge Instructions


Discharge Instructions


Date of Service


May 24, 2018.





Admission


Reason for Admission:  Foot Fracture





Discharge


Discharge Diagnosis / Problem:  foot fracture. hepato renal syndrome





Discharge Goals


Goal(s):  Decrease discomfort





Activity Recommendations


Activity Level:  Assistance Required





.





Additional Information


Patient informed of condition:  Yes


Advance Directives:  Yes


DNR:  Yes


Level of Care:  Other (transfering to [facility])


Communicable Disease:  No


Prognosis:  Stable


Tavarez Catheter:  Yes





Instructions / Follow-Up


Instructions / Follow-Up


FOLLOWUP AS PER [facility] RECOMMENDATIONS





Current Hospital Diet


Patient's current hospital diet: AHA Diet (Heart Healthy), Low Sodium Diet (2gm 

Na)





Discharge Diet


Recommended Diet:  AHA Diet (Heart Healthy), Low Sodium Diet (2gm Na), Renal 

Diet





Pending Studies


Studies pending at discharge:  no





Physician Orders On Transfer


Special Precautions:


FALL AND ASPIRATION PRECAUTIONS


IV Therapy:


IV ALBUMIN


Vital Signs:


EVERY 8HRS


Additional Orders:


PLEASE CHECK MEDICATION RECONCILIATION FOR ACCURATE MEDICATION LIST





Medical Emergencies








.


Who to Call and When:





Medical Emergencies:  If at any time you feel your situation is an emergency, 

please call 911 immediately.





.





Non-Emergent Contact


Non-Emergency issues call your:  Primary Care Provider





.


.








"Provider Documentation" section prepared by Mayco Butterfield.








.





Consultant Recommendations


Consultant Recommendations:


NWB Left lower extremity


Boot on left ankle when out of bed, okay to remove when out of bed.


Allowed for ankle and toe range of motion.


Ice and elevation left leg as needed for pain/swelling.


Follow up with Dr. Shaikh on 6/5/18 at 8:45 a.m.


Please call [phone] with any questions or concerns or need to reschedule 

appointment.





Core Measure Problem


Core Measures:  None

## 2018-05-24 NOTE — PROGRESS NOTE
Internal Med Progress Note


Date of Service:


May 24, 2018.


Provider Documentation:





SUBJECTIVE:





sitting on the chair


complains of pain in left foot


afebrile


no chest pain or sob


still legs are swollen


family and patient wants the patient to be transferred to [place] 








OBJECTIVE:





Vital Signs-as noted below





Exam:


General-alert and awake and oriented. Not in distress


ENT-normal hearing


Neck-supple


Lungs-cta b/l no wheezing or crackles


Heart-s1 and s2 heard regular rate and rhythm,no Murmur 


Abdomen-soft bowel sounds present non tender no distension 


Extremities-b/l lower extremity edema  present left foot in dressing


Neuro-alert and awake


moves extremities








Lab data as noted below.


ASSESSMENT & PLAN:


BILATERAL LOWER LEG EDEMA LIKELY SECONDARY TO:


ACUTE ON CHRONIC DIASTOLIC CHF, RIGHT SIDED HEART FAILURE


HISTORY OF LIVER CIRRHOSIS, DENY on CKD stage 4


Weight gain, bilateral leg swelling/seepage despite compliance with home 

diuretic Rx


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 

ultrasound 





Was initially  Started Lasix 80mg IV + Albumin q12h on hospital day 1


    Creatinine  further increased from 2.1 to 2.6


    Poor urine output, Still had significant lower leg edema bilaterally


 Lasix held 


    given increased Albumin IV


 crea still increased to 2.8


    Albumin increased to q6h


  5/23:


   crea further increased to 3.0


   intravascularly volumed depleted


   continued Albumin IV q6h


      echo showing right sided heart failure- will consulted Cardiology


5/24


cr 3.1 today


started on midodrine and octreotide for hepato renal syndrome as per GI and 

Nephrology recommendation


family concerned about worsening patient status and wanted to Transfer to 

[facility]








NAFLD CIRRHOSIS


repeat US: moderate ascites


GI consulted


possible hepatorenal syndrome?


plan for paracentesis? when INR below 2.0





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


Serum creatinine 2.01 at time of admission.


Nephrology consulted


Crea increased from 2.1 to 3.1


management as noted above


Transferring To [facility]





Paroxysmal atrial fibrillation


On Coumadin


 Episodic bradycardia since increase in beta-blocker frequency adjustment from 

recent confinement


 Metoprolol reduced to 25mg BID from TID   


   Heart ratewas  still in the mid 50s


    Further reduce metoprolol to 12.5 mg p.o. twice daily


     Monitor


Coumadin on hold for planned paracentesis


INR 2.0 today





LEFT FOOT METATARSAL FRACTURES


s/p Fall, Mechanical


Foot Xray


1.  Acute left fifth metatarsal fracture 


2.  Subacute fractures of her second and third metatarsals.


-- Ortho consulted


   non weightbearing x 2 weeks


   ff up with Ortho








HTN


stable 


Reduced the metoprolol dose for low HR


Monitor closely





Chronic hyponatremia secondary to chronic liver disease.


 stable


na 132 today





Chronic anemia 2 to CKD


hb 9.7 today





DVT prophylaxis


coumadin





DNR. 








DISPOSITION


Transferring To [facility]


Vital Signs:











  Date Time  Temp Pulse Resp B/P (MAP) Pulse Ox O2 Delivery O2 Flow Rate FiO2


 


5/24/18 16:00     95 Room Air  


 


5/24/18 15:00 36.4 57 22 115/64 (81) 90 Room Air  


 


5/24/18 12:00     95 Room Air  


 


5/24/18 10:50 36.3 55 20 111/67 (82) 97 Room Air  


 


5/24/18 08:00     95 Room Air  


 


5/24/18 05:53 36.4 52 13 98/63 (75) 95 Room Air  


 


5/24/18 04:00     94 Room Air  


 


5/24/18 03:44 36.5 58 17 99/60 (73) 94 Room Air  


 


5/24/18 00:01     94 CPAP  


 


5/23/18 23:21 36.4 62 18 109/64 (79) 95 Room Air  


 


5/23/18 20:00     92 Room Air  


 


5/23/18 18:54 36.8 58 18 117/69 (85) 92 Room Air  








Lab Results:





Results Past 24 Hours








Test


  5/24/18


05:22 5/24/18


11:20 Range/Units


 


 


Prothrombin Time


  20.5


  


  9.0-12.0


SECONDS


 


Prothromb Time International


Ratio 2.0


  


  0.9-1.1  


 


 


Sodium Level 132  136-145  mmol/L


 


Potassium Level 4.5  3.5-5.1  mmol/L


 


Chloride Level 96    mmol/L


 


Carbon Dioxide Level 27  21-32  mmol/L


 


Anion Gap 9.0  3-11  mmol/L


 


Blood Urea Nitrogen 70  7-18  mg/dl


 


Creatinine


  3.10


  


  0.60-1.20


mg/dl


 


Est Creatinine Clear Calc


Drug Dose 14.0


  


   ml/min


 


 


Estimated GFR (


American) 15.5


  


   


 


 


Estimated GFR (Non-


American 13.3


  


   


 


 


BUN/Creatinine Ratio 22.6  10-20  


 


Random Glucose 78  70-99  mg/dl


 


Calcium Level 8.8  8.5-10.1  mg/dl


 


Urine Random Sodium  8  mEq/L

## 2018-05-24 NOTE — PROGRESS NOTE
Progress Note


Date of Service


May 24, 2018.


 (Evelyn Mejia ., LEONID)





Progress Note


Pt was seen and evaluated. No acute changes. No complaints other than foot 

pain. Midodrine was added to albumin. Crt 3.1. Making urine. Denies abd pain, 

nausea, vomiting, black/bloody stools. No fever, chills, CP, SOB.





No acute distress


Abd soft w/o tenderness


Bilateral lower extremity edema w/ boot on left left lower extremity





83 year old female history of HF, admitted after a fall w/ L 5th metatarsal 

fracture. She has DENY w/ cr bumped from 1.6 to 2.8, urine NA 6 concerning for 

HRS. US ABD w/ moderate ascites. Perhaps the ascites is secondary to right 

sided heart disease. Repeat CRT this AM 3.1, w/ urine NA 8. GI added midodrine 

last evening, nephrology following.





- Ascites


   - Non urgent diagnostic paracentesis to r/o SBP   


      - No more than 3L off given DENY


      - Continue albumin as prescribed


      - Will need INR < 2


      - Please send ascites fluid protein, albumin, cell count, cytology, 

culture


- DENY/HRS


   - Hold lasix


   - CRT 3.1, baseline 1.6


   - Daily BMP


   - Urine NA 6, 8


   - Continue albumin 25% 12.5G BID


   - Consider Midodrine 5 TID


   - Consider octreotide 


- Low NA diet, less than 2G





GI to watch peripherally. Please call with any acute changes, questions or 

concerns. 


 (Evelyn Mejia ., LEONID)


Attending attestation





I have seen, examined this patient, and agree with the findings and above by 

our mid-level provider ROBYN Flanagan.





-Cr a bit worse today, still with peripheral edema


-Nephrology is unsure of HRS, will defer further management to Nephrology


-GI will sign off


 (Oumar Horn MD)